# Patient Record
Sex: FEMALE | Race: WHITE | Employment: OTHER | URBAN - METROPOLITAN AREA
[De-identification: names, ages, dates, MRNs, and addresses within clinical notes are randomized per-mention and may not be internally consistent; named-entity substitution may affect disease eponyms.]

---

## 2017-07-13 ENCOUNTER — OFFICE VISIT (OUTPATIENT)
Dept: PRIMARY CARE CLINIC | Age: 62
End: 2017-07-13
Payer: MEDICARE

## 2017-07-13 VITALS
HEIGHT: 65 IN | SYSTOLIC BLOOD PRESSURE: 142 MMHG | WEIGHT: 203 LBS | DIASTOLIC BLOOD PRESSURE: 88 MMHG | RESPIRATION RATE: 17 BRPM | BODY MASS INDEX: 33.82 KG/M2 | HEART RATE: 78 BPM

## 2017-07-13 DIAGNOSIS — Z11.4 SCREENING FOR HIV WITHOUT PRESENCE OF RISK FACTORS: ICD-10-CM

## 2017-07-13 DIAGNOSIS — Z23 NEED FOR PROPHYLACTIC VACCINATION AGAINST STREPTOCOCCUS PNEUMONIAE (PNEUMOCOCCUS): ICD-10-CM

## 2017-07-13 DIAGNOSIS — Z12.31 ENCOUNTER FOR SCREENING MAMMOGRAM FOR BREAST CANCER: ICD-10-CM

## 2017-07-13 DIAGNOSIS — F17.200 SMOKER: Primary | ICD-10-CM

## 2017-07-13 DIAGNOSIS — Z11.59 NEED FOR HEPATITIS C SCREENING TEST: ICD-10-CM

## 2017-07-13 DIAGNOSIS — Z23 NEED FOR PROPHYLACTIC VACCINATION AND INOCULATION AGAINST VARICELLA: ICD-10-CM

## 2017-07-13 DIAGNOSIS — F32.4 MAJOR DEPRESSIVE DISORDER WITH SINGLE EPISODE, IN PARTIAL REMISSION (HCC): ICD-10-CM

## 2017-07-13 DIAGNOSIS — Z23 NEED FOR PROPHYLACTIC VACCINATION WITH TETANUS-DIPHTHERIA (TD): ICD-10-CM

## 2017-07-13 DIAGNOSIS — Z12.11 SCREENING FOR COLON CANCER: ICD-10-CM

## 2017-07-13 DIAGNOSIS — Z13.220 SCREENING FOR HYPERLIPIDEMIA: ICD-10-CM

## 2017-07-13 DIAGNOSIS — Z12.4 PAP SMEAR FOR CERVICAL CANCER SCREENING: ICD-10-CM

## 2017-07-13 PROCEDURE — 99204 OFFICE O/P NEW MOD 45 MIN: CPT | Performed by: INTERNAL MEDICINE

## 2017-07-13 PROCEDURE — G0009 ADMIN PNEUMOCOCCAL VACCINE: HCPCS | Performed by: INTERNAL MEDICINE

## 2017-07-13 PROCEDURE — G0444 DEPRESSION SCREEN ANNUAL: HCPCS | Performed by: INTERNAL MEDICINE

## 2017-07-13 PROCEDURE — 90670 PCV13 VACCINE IM: CPT | Performed by: INTERNAL MEDICINE

## 2017-07-13 RX ORDER — POTASSIUM CHLORIDE 1500 MG/1
20 TABLET, FILM COATED, EXTENDED RELEASE ORAL 2 TIMES DAILY WITH MEALS
COMMUNITY
End: 2019-01-01

## 2017-07-13 RX ORDER — LOSARTAN POTASSIUM AND HYDROCHLOROTHIAZIDE 12.5; 5 MG/1; MG/1
1 TABLET ORAL DAILY
COMMUNITY
End: 2018-03-22

## 2017-07-13 RX ORDER — BUPROPION HYDROCHLORIDE 150 MG/1
150 TABLET, EXTENDED RELEASE ORAL DAILY
Qty: 60 TABLET | Refills: 3 | Status: SHIPPED | OUTPATIENT
Start: 2017-07-13

## 2017-07-13 RX ORDER — TRAZODONE HYDROCHLORIDE 50 MG/1
50 TABLET ORAL NIGHTLY
COMMUNITY
End: 2017-09-21 | Stop reason: SDUPTHER

## 2017-07-13 RX ORDER — FLUOXETINE HYDROCHLORIDE 40 MG/1
40 CAPSULE ORAL DAILY
Qty: 60 CAPSULE | Refills: 3 | Status: SHIPPED | OUTPATIENT
Start: 2017-07-13 | End: 2018-02-26 | Stop reason: SDUPTHER

## 2017-07-13 RX ORDER — FLUOXETINE HYDROCHLORIDE 20 MG/1
20 CAPSULE ORAL DAILY
COMMUNITY
End: 2017-07-13 | Stop reason: SDUPTHER

## 2017-07-13 RX ORDER — BUDESONIDE AND FORMOTEROL FUMARATE DIHYDRATE 160; 4.5 UG/1; UG/1
2 AEROSOL RESPIRATORY (INHALATION) 2 TIMES DAILY
COMMUNITY
End: 2017-12-06 | Stop reason: SDUPTHER

## 2017-07-13 RX ORDER — BUPROPION HYDROCHLORIDE 150 MG/1
TABLET, EXTENDED RELEASE ORAL
COMMUNITY
End: 2017-07-13 | Stop reason: SDUPTHER

## 2017-07-13 RX ORDER — DIPHENHYDRAMINE HCL 25 MG
25 TABLET ORAL EVERY 6 HOURS PRN
COMMUNITY
End: 2018-03-22

## 2017-07-13 RX ORDER — FUROSEMIDE 20 MG/1
20 TABLET ORAL
Status: ON HOLD | COMMUNITY
End: 2018-06-09 | Stop reason: HOSPADM

## 2017-07-13 RX ORDER — LISINOPRIL 5 MG/1
5 TABLET ORAL DAILY
COMMUNITY
End: 2017-07-13 | Stop reason: ALTCHOICE

## 2017-07-13 RX ORDER — CARVEDILOL 25 MG/1
25 TABLET ORAL 2 TIMES DAILY WITH MEALS
COMMUNITY
End: 2017-09-21 | Stop reason: SDUPTHER

## 2017-07-13 RX ORDER — LOSARTAN POTASSIUM 100 MG/1
100 TABLET ORAL
COMMUNITY
End: 2017-07-13 | Stop reason: ALTCHOICE

## 2017-07-13 ASSESSMENT — PATIENT HEALTH QUESTIONNAIRE - PHQ9
SUM OF ALL RESPONSES TO PHQ9 QUESTIONS 1 & 2: 6
1. LITTLE INTEREST OR PLEASURE IN DOING THINGS: 3
6. FEELING BAD ABOUT YOURSELF - OR THAT YOU ARE A FAILURE OR HAVE LET YOURSELF OR YOUR FAMILY DOWN: 3
2. FEELING DOWN, DEPRESSED OR HOPELESS: 3
5. POOR APPETITE OR OVEREATING: 1
10. IF YOU CHECKED OFF ANY PROBLEMS, HOW DIFFICULT HAVE THESE PROBLEMS MADE IT FOR YOU TO DO YOUR WORK, TAKE CARE OF THINGS AT HOME, OR GET ALONG WITH OTHER PEOPLE: 3
8. MOVING OR SPEAKING SO SLOWLY THAT OTHER PEOPLE COULD HAVE NOTICED. OR THE OPPOSITE, BEING SO FIGETY OR RESTLESS THAT YOU HAVE BEEN MOVING AROUND A LOT MORE THAN USUAL: 2
3. TROUBLE FALLING OR STAYING ASLEEP: 0
SUM OF ALL RESPONSES TO PHQ QUESTIONS 1-9: 17
9. THOUGHTS THAT YOU WOULD BE BETTER OFF DEAD, OR OF HURTING YOURSELF: 1
4. FEELING TIRED OR HAVING LITTLE ENERGY: 2
7. TROUBLE CONCENTRATING ON THINGS, SUCH AS READING THE NEWSPAPER OR WATCHING TELEVISION: 2

## 2017-07-13 ASSESSMENT — ENCOUNTER SYMPTOMS
VOMITING: 0
NAUSEA: 0
SHORTNESS OF BREATH: 0
ABDOMINAL PAIN: 0
TROUBLE SWALLOWING: 0
EYE DISCHARGE: 0
EYE REDNESS: 0
BACK PAIN: 1
SORE THROAT: 0
COUGH: 0

## 2017-08-17 ENCOUNTER — HOSPITAL ENCOUNTER (OUTPATIENT)
Dept: MAMMOGRAPHY | Age: 62
Discharge: HOME | End: 2017-08-17
Payer: MEDICARE

## 2017-08-17 DIAGNOSIS — Z12.31 ENCOUNTER FOR SCREENING MAMMOGRAM FOR BREAST CANCER: ICD-10-CM

## 2017-08-17 PROCEDURE — G0202 SCR MAMMO BI INCL CAD: HCPCS

## 2017-09-19 ENCOUNTER — OFFICE VISIT (OUTPATIENT)
Dept: OBGYN CLINIC | Age: 62
End: 2017-09-19
Payer: MEDICARE

## 2017-09-19 ENCOUNTER — HOSPITAL ENCOUNTER (OUTPATIENT)
Age: 62
Setting detail: SPECIMEN
Discharge: HOME | End: 2017-09-19
Payer: MEDICARE

## 2017-09-19 VITALS
HEIGHT: 66 IN | DIASTOLIC BLOOD PRESSURE: 70 MMHG | BODY MASS INDEX: 33.43 KG/M2 | SYSTOLIC BLOOD PRESSURE: 130 MMHG | WEIGHT: 208 LBS

## 2017-09-19 DIAGNOSIS — Z13.820 SCREENING FOR OSTEOPOROSIS: ICD-10-CM

## 2017-09-19 DIAGNOSIS — R61 SWEATING INCREASE: ICD-10-CM

## 2017-09-19 DIAGNOSIS — Z11.59 ENCOUNTER FOR HEPATITIS C SCREENING TEST FOR LOW RISK PATIENT: ICD-10-CM

## 2017-09-19 DIAGNOSIS — Z11.4 ENCOUNTER FOR SCREENING FOR HIV: ICD-10-CM

## 2017-09-19 DIAGNOSIS — Z01.419 PAP SMEAR, AS PART OF ROUTINE GYNECOLOGICAL EXAMINATION: Primary | ICD-10-CM

## 2017-09-19 PROCEDURE — G0101 CA SCREEN;PELVIC/BREAST EXAM: HCPCS | Performed by: NURSE PRACTITIONER

## 2017-09-19 ASSESSMENT — ENCOUNTER SYMPTOMS
WHEEZING: 0
CONSTIPATION: 0
PHOTOPHOBIA: 0
COUGH: 0
VOMITING: 0
CHEST TIGHTNESS: 0
ABDOMINAL PAIN: 0
COLOR CHANGE: 0
BLOOD IN STOOL: 0
ABDOMINAL DISTENTION: 0
BACK PAIN: 0
DIARRHEA: 0
SHORTNESS OF BREATH: 0
NAUSEA: 0

## 2017-09-21 ENCOUNTER — OFFICE VISIT (OUTPATIENT)
Dept: PRIMARY CARE CLINIC | Age: 62
End: 2017-09-21
Payer: MEDICARE

## 2017-09-21 VITALS
HEART RATE: 47 BPM | BODY MASS INDEX: 34.55 KG/M2 | OXYGEN SATURATION: 96 % | HEIGHT: 65 IN | WEIGHT: 207.4 LBS | RESPIRATION RATE: 18 BRPM | DIASTOLIC BLOOD PRESSURE: 86 MMHG | SYSTOLIC BLOOD PRESSURE: 156 MMHG

## 2017-09-21 DIAGNOSIS — I10 ESSENTIAL HYPERTENSION: ICD-10-CM

## 2017-09-21 DIAGNOSIS — M54.5 CHRONIC LOW BACK PAIN, UNSPECIFIED BACK PAIN LATERALITY, WITH SCIATICA PRESENCE UNSPECIFIED: ICD-10-CM

## 2017-09-21 DIAGNOSIS — G89.29 CHRONIC LOW BACK PAIN, UNSPECIFIED BACK PAIN LATERALITY, WITH SCIATICA PRESENCE UNSPECIFIED: ICD-10-CM

## 2017-09-21 DIAGNOSIS — Z00.00 MEDICARE ANNUAL WELLNESS VISIT, INITIAL: Primary | ICD-10-CM

## 2017-09-21 PROCEDURE — 99214 OFFICE O/P EST MOD 30 MIN: CPT | Performed by: INTERNAL MEDICINE

## 2017-09-21 RX ORDER — AMLODIPINE BESYLATE 5 MG/1
5 TABLET ORAL
COMMUNITY
Start: 2017-08-04 | End: 2017-09-21 | Stop reason: SDUPTHER

## 2017-09-21 RX ORDER — TRAMADOL HYDROCHLORIDE 50 MG/1
50 TABLET ORAL 2 TIMES DAILY PRN
Qty: 30 TABLET | Refills: 0 | Status: SHIPPED | OUTPATIENT
Start: 2017-09-21 | End: 2017-10-01

## 2017-09-21 RX ORDER — AMLODIPINE BESYLATE 10 MG/1
10 TABLET ORAL DAILY
Qty: 90 TABLET | Refills: 3 | Status: SHIPPED | OUTPATIENT
Start: 2017-09-21 | End: 2018-09-24 | Stop reason: SDUPTHER

## 2017-09-21 RX ORDER — TRAZODONE HYDROCHLORIDE 50 MG/1
50 TABLET ORAL NIGHTLY
Qty: 30 TABLET | Refills: 1 | Status: SHIPPED | OUTPATIENT
Start: 2017-09-21 | End: 2017-12-06 | Stop reason: SDUPTHER

## 2017-09-21 RX ORDER — CARVEDILOL 25 MG/1
25 TABLET ORAL 2 TIMES DAILY WITH MEALS
Qty: 60 TABLET | Refills: 3 | Status: SHIPPED | OUTPATIENT
Start: 2017-09-21 | End: 2018-01-29 | Stop reason: SDUPTHER

## 2017-09-21 ASSESSMENT — PATIENT HEALTH QUESTIONNAIRE - PHQ9
1. LITTLE INTEREST OR PLEASURE IN DOING THINGS: 0
SUM OF ALL RESPONSES TO PHQ9 QUESTIONS 1 & 2: 0
SUM OF ALL RESPONSES TO PHQ QUESTIONS 1-9: 0
2. FEELING DOWN, DEPRESSED OR HOPELESS: 0

## 2017-09-21 ASSESSMENT — ENCOUNTER SYMPTOMS
TROUBLE SWALLOWING: 0
NAUSEA: 0
SHORTNESS OF BREATH: 0
COUGH: 0
EYE DISCHARGE: 0
VOMITING: 0
EYE REDNESS: 0
SORE THROAT: 0
BACK PAIN: 1
ABDOMINAL PAIN: 0

## 2017-09-21 ASSESSMENT — LIFESTYLE VARIABLES: HOW OFTEN DO YOU HAVE A DRINK CONTAINING ALCOHOL: 0

## 2017-09-21 ASSESSMENT — ANXIETY QUESTIONNAIRES: GAD7 TOTAL SCORE: 4

## 2017-09-22 ENCOUNTER — HOSPITAL ENCOUNTER (OUTPATIENT)
Dept: MAMMOGRAPHY | Age: 62
Discharge: HOME | End: 2017-09-22
Payer: MEDICARE

## 2017-09-22 ENCOUNTER — HOSPITAL ENCOUNTER (OUTPATIENT)
Age: 62
Discharge: HOME | End: 2017-09-22
Payer: MEDICARE

## 2017-09-22 DIAGNOSIS — Z13.820 SCREENING FOR OSTEOPOROSIS: ICD-10-CM

## 2017-09-22 LAB
HEPATITIS C ANTIBODY: NONREACTIVE
HIV AG/AB: NONREACTIVE
HPV SAMPLE: NORMAL
HPV SOURCE: NORMAL
HPV, GENOTYPE 16: NOT DETECTED
HPV, GENOTYPE 18: NOT DETECTED
HPV, HIGH RISK OTHER: NOT DETECTED
HPV, INTERPRETATION: NORMAL
TSH SERPL DL<=0.05 MIU/L-ACNC: 0.63 MIU/L (ref 0.3–5)

## 2017-09-22 PROCEDURE — 84443 ASSAY THYROID STIM HORMONE: CPT

## 2017-09-22 PROCEDURE — 86803 HEPATITIS C AB TEST: CPT

## 2017-09-22 PROCEDURE — 36415 COLL VENOUS BLD VENIPUNCTURE: CPT

## 2017-09-22 PROCEDURE — 87389 HIV-1 AG W/HIV-1&-2 AB AG IA: CPT

## 2017-09-22 PROCEDURE — 77080 DXA BONE DENSITY AXIAL: CPT

## 2017-09-25 LAB — CYTOLOGY REPORT: NORMAL

## 2017-09-26 ENCOUNTER — TELEPHONE (OUTPATIENT)
Dept: OBGYN CLINIC | Age: 62
End: 2017-09-26

## 2017-12-06 RX ORDER — BUDESONIDE AND FORMOTEROL FUMARATE DIHYDRATE 160; 4.5 UG/1; UG/1
1 AEROSOL RESPIRATORY (INHALATION) 2 TIMES DAILY
Qty: 1 INHALER | Refills: 2 | OUTPATIENT
Start: 2017-12-06 | End: 2018-02-26 | Stop reason: SDUPTHER

## 2017-12-06 RX ORDER — TRAZODONE HYDROCHLORIDE 50 MG/1
TABLET ORAL
Qty: 30 TABLET | Refills: 1 | Status: SHIPPED | OUTPATIENT
Start: 2017-12-06 | End: 2018-01-29 | Stop reason: SDUPTHER

## 2017-12-06 NOTE — TELEPHONE ENCOUNTER
Last OV 9/21/17    Health Maintenance   Topic Date Due    Colon cancer screen colonoscopy  01/25/2003    Zostavax vaccine  01/25/2013    Flu vaccine (1) 09/01/2017    Lipid screen  04/18/2018 (Originally 1/25/1993)    Diabetes screen  04/18/2018 (Originally 1/25/1993)    DTaP/Tdap/Td vaccine (1 - Tdap) 07/13/2018 (Originally 1/25/1972)    Pneumococcal med risk (1 of 1 - PPSV23) 07/13/2018 (Originally 1/25/1972)    Breast cancer screen  08/17/2019    Hepatitis C screen  Completed    HIV screen  Completed             (applicable per patient's age: Cancer Screenings, Depression Screening, Fall Risk Screening, Immunizations)    BUN (mg/dL)   Date Value   11/12/2014 10      (goal A1C is < 7)   (goal LDL is <100) need 30-50% reduction from baseline     BP Readings from Last 3 Encounters:   09/21/17 (!) 156/86   09/19/17 130/70   07/13/17 (!) 142/88    (goal /80)      All Future Testing planned in [facility]:  Lab Frequency Next Occurrence   POCT FECAL IMMUNOCHEMICAL TEST (FIT) Once 08/12/2017   Hepatitis C Antibody Once 09/19/2017   HIV Screen Once 10/19/2017   TSH With Reflex Ft4 Once 09/19/2017       Next Visit Date:  Future Appointments  Date Time Provider Marianela Renner   3/22/2018 9:00 AM Mandy Patel MD [facility]            There is no problem list on file for this patient.

## 2017-12-06 NOTE — TELEPHONE ENCOUNTER
Last seen 09/21/17  Last fill 09/21/17 # 30 1 refill     Health Maintenance   Topic Date Due    Colon cancer screen colonoscopy  01/25/2003    Zostavax vaccine  01/25/2013    Flu vaccine (1) 09/01/2017    Lipid screen  04/18/2018 (Originally 1/25/1993)    Diabetes screen  04/18/2018 (Originally 1/25/1993)    DTaP/Tdap/Td vaccine (1 - Tdap) 07/13/2018 (Originally 1/25/1972)    Pneumococcal med risk (1 of 1 - PPSV23) 07/13/2018 (Originally 1/25/1972)    Breast cancer screen  08/17/2019    Hepatitis C screen  Completed    HIV screen  Completed             (applicable per patient's age: Cancer Screenings, Depression Screening, Fall Risk Screening, Immunizations)    BUN (mg/dL)   Date Value   11/12/2014 10      (goal A1C is < 7)   (goal LDL is <100) need 30-50% reduction from baseline     BP Readings from Last 3 Encounters:   09/21/17 (!) 156/86   09/19/17 130/70   07/13/17 (!) 142/88    (goal /80)      All Future Testing planned in [facility]:  Lab Frequency Next Occurrence   POCT FECAL IMMUNOCHEMICAL TEST (FIT) Once 08/12/2017   Hepatitis C Antibody Once 09/19/2017   HIV Screen Once 10/19/2017   TSH With Reflex Ft4 Once 09/19/2017       Next Visit Date:  Future Appointments  Date Time Provider Marianela Renner   3/22/2018 9:00 AM Armond Azul MD [facility]            There is no problem list on file for this patient.

## 2018-01-29 RX ORDER — TRAZODONE HYDROCHLORIDE 50 MG/1
TABLET ORAL
Qty: 30 TABLET | Refills: 1 | Status: SHIPPED | OUTPATIENT
Start: 2018-01-29 | End: 2018-03-26 | Stop reason: SDUPTHER

## 2018-01-29 RX ORDER — CARVEDILOL 25 MG/1
TABLET ORAL
Qty: 60 TABLET | Refills: 3 | Status: SHIPPED | OUTPATIENT
Start: 2018-01-29 | End: 2018-05-15 | Stop reason: SDUPTHER

## 2018-02-26 DIAGNOSIS — F32.4 MAJOR DEPRESSIVE DISORDER WITH SINGLE EPISODE, IN PARTIAL REMISSION (HCC): ICD-10-CM

## 2018-02-26 RX ORDER — BUDESONIDE AND FORMOTEROL FUMARATE DIHYDRATE 160; 4.5 UG/1; UG/1
AEROSOL RESPIRATORY (INHALATION)
Qty: 10.2 G | Refills: 2 | Status: SHIPPED | OUTPATIENT
Start: 2018-02-26 | End: 2018-03-26 | Stop reason: SDUPTHER

## 2018-02-26 RX ORDER — FLUOXETINE HYDROCHLORIDE 40 MG/1
CAPSULE ORAL
Qty: 60 CAPSULE | Refills: 3 | Status: SHIPPED | OUTPATIENT
Start: 2018-02-26 | End: 2018-09-24 | Stop reason: SDUPTHER

## 2018-03-22 ENCOUNTER — OFFICE VISIT (OUTPATIENT)
Dept: PRIMARY CARE CLINIC | Age: 63
End: 2018-03-22
Payer: MEDICARE

## 2018-03-22 VITALS
WEIGHT: 197 LBS | DIASTOLIC BLOOD PRESSURE: 80 MMHG | SYSTOLIC BLOOD PRESSURE: 132 MMHG | HEIGHT: 65 IN | HEART RATE: 73 BPM | BODY MASS INDEX: 32.82 KG/M2 | OXYGEN SATURATION: 93 %

## 2018-03-22 DIAGNOSIS — F51.01 PRIMARY INSOMNIA: ICD-10-CM

## 2018-03-22 DIAGNOSIS — I10 ESSENTIAL HYPERTENSION: Primary | ICD-10-CM

## 2018-03-22 DIAGNOSIS — J41.0 SIMPLE CHRONIC BRONCHITIS (HCC): ICD-10-CM

## 2018-03-22 DIAGNOSIS — Z12.11 SCREENING FOR COLON CANCER: ICD-10-CM

## 2018-03-22 DIAGNOSIS — J42 CHRONIC BRONCHITIS, UNSPECIFIED CHRONIC BRONCHITIS TYPE (HCC): ICD-10-CM

## 2018-03-22 DIAGNOSIS — I50.22 CHRONIC SYSTOLIC CONGESTIVE HEART FAILURE (HCC): ICD-10-CM

## 2018-03-22 DIAGNOSIS — F32.5 MAJOR DEPRESSIVE DISORDER WITH SINGLE EPISODE, IN FULL REMISSION (HCC): ICD-10-CM

## 2018-03-22 DIAGNOSIS — Z82.61 FAMILY HISTORY OF RHEUMATOID ARTHRITIS: ICD-10-CM

## 2018-03-22 DIAGNOSIS — M19.90 ARTHRITIS: ICD-10-CM

## 2018-03-22 PROCEDURE — G8417 CALC BMI ABV UP PARAM F/U: HCPCS | Performed by: INTERNAL MEDICINE

## 2018-03-22 PROCEDURE — 1123F ACP DISCUSS/DSCN MKR DOCD: CPT | Performed by: INTERNAL MEDICINE

## 2018-03-22 PROCEDURE — G8484 FLU IMMUNIZE NO ADMIN: HCPCS | Performed by: INTERNAL MEDICINE

## 2018-03-22 PROCEDURE — 4004F PT TOBACCO SCREEN RCVD TLK: CPT | Performed by: INTERNAL MEDICINE

## 2018-03-22 PROCEDURE — G8926 SPIRO NO PERF OR DOC: HCPCS | Performed by: INTERNAL MEDICINE

## 2018-03-22 PROCEDURE — 3014F SCREEN MAMMO DOC REV: CPT | Performed by: INTERNAL MEDICINE

## 2018-03-22 PROCEDURE — G8427 DOCREV CUR MEDS BY ELIG CLIN: HCPCS | Performed by: INTERNAL MEDICINE

## 2018-03-22 PROCEDURE — 1090F PRES/ABSN URINE INCON ASSESS: CPT | Performed by: INTERNAL MEDICINE

## 2018-03-22 PROCEDURE — 4040F PNEUMOC VAC/ADMIN/RCVD: CPT | Performed by: INTERNAL MEDICINE

## 2018-03-22 PROCEDURE — 3017F COLORECTAL CA SCREEN DOC REV: CPT | Performed by: INTERNAL MEDICINE

## 2018-03-22 PROCEDURE — 3023F SPIROM DOC REV: CPT | Performed by: INTERNAL MEDICINE

## 2018-03-22 PROCEDURE — G8399 PT W/DXA RESULTS DOCUMENT: HCPCS | Performed by: INTERNAL MEDICINE

## 2018-03-22 PROCEDURE — 99214 OFFICE O/P EST MOD 30 MIN: CPT | Performed by: INTERNAL MEDICINE

## 2018-03-22 ASSESSMENT — ENCOUNTER SYMPTOMS
SORE THROAT: 0
TROUBLE SWALLOWING: 0
NAUSEA: 0
BACK PAIN: 0
EYE REDNESS: 0
ABDOMINAL PAIN: 0
SHORTNESS OF BREATH: 0
VOMITING: 0
EYE DISCHARGE: 0
COUGH: 0

## 2018-03-22 NOTE — PROGRESS NOTES
[facility] PHYSICIANS  [facility] INTERNAL MEDICINE  [address]  [address] [facility] [address]  Dept: [phone]  Dept Fax: [phone]    Tiffany Bucio is a 70 y.o. female who presents today for her medical conditions/complaints as noted below. Tiffany Bucio is c/o of   Chief Complaint   Patient presents with    Hypertension     6 month follow up. Pt here for management of chronic medical problems. HPI:     HPI    She was Known to have  Hypertension ,controlled   Denies  HEADACHES , PALPITATIONS     No   complaints of dizziness. No     shortness of breath . No    chest pain . COPD - stable on current inhalers     CHF , systolic , EF 33 % - follows with cardio at [address] [place] [address] - stable on lasix . Follow up visit coming soon . 1. Depression is in remission . 2. Mood ok . 3. Appetite and sleep ok . 4. PHQ2 negative . On wellbutrin       BUN (mg/dL)   Date Value   11/12/2014 10     BP Readings from Last 3 Encounters:   03/22/18 132/80   09/21/17 (!) 156/86   09/19/17 130/70          (goal 120/80)    Past Medical History:   Diagnosis Date    COPD (chronic obstructive pulmonary disease) ([facility] [address]     Depression     Hyperlipidemia     Hypertension       Past Surgical History:   Procedure Laterality Date    ANKLE SURGERY      broken ankle    APPENDECTOMY      HYSTERECTOMY, TOTAL ABDOMINAL  1982    SHOULDER SURGERY      TONSILLECTOMY         History reviewed. No pertinent family history.     Social History   Substance Use Topics    Smoking status: Current Every Day Smoker     Packs/day: 0.25     Types: Cigarettes     Start date: 7/13/1963    Smokeless tobacco: Never Used      Comment: 2 cigarettes a day; trying to quit    Alcohol use No      Current Outpatient Prescriptions   Medication Sig Dispense Refill    SYMBICORT 160-4.5 MCG/ACT AERO INHALE 1 PUFF BY MOUTH TWO TIMES A DAY 10.2 g 2    FLUoxetine (PROZAC) 40 MG capsule TAKE ONE CAPSULE BY MOUTH ONCE DAILY 60 capsule 3    Skin: Negative for rash and wound. Neurological: Negative for dizziness and headaches. Hematological: Negative for adenopathy. Psychiatric/Behavioral: Negative for behavioral problems. The patient is not nervous/anxious. Objective:     Physical Exam   Constitutional: She is oriented to person, place, and time. She appears well-developed and well-nourished. No distress. HENT:   Head: Normocephalic and atraumatic. Right Ear: External ear normal.   Left Ear: External ear normal.   Nose: Nose normal.   Mouth/Throat: Oropharynx is clear and moist. No oropharyngeal exudate. Eyes: Conjunctivae are normal. Right eye exhibits no discharge. Left eye exhibits no discharge. No scleral icterus. Neck: Neck supple. Cardiovascular: Normal rate, regular rhythm and normal heart sounds. No murmur heard. Pulmonary/Chest: Effort normal and breath sounds normal. No respiratory distress. She has no wheezes. Abdominal: Soft. Bowel sounds are normal. She exhibits no distension. There is no tenderness. Musculoskeletal: She exhibits no edema or tenderness. Lymphadenopathy:     She has no cervical adenopathy. Neurological: She is alert and oriented to person, place, and time. Skin: Skin is warm and dry. No rash noted. She is not diaphoretic. Psychiatric: Judgment and thought content normal.   Nursing note and vitals reviewed. /80 (Site: Left Arm, Position: Sitting, Cuff Size: Medium Adult)   Pulse 73   Ht 5' 5\" (1.651 m)   Wt 197 lb (89.4 kg)   SpO2 93%   BMI 32.78 kg/m²     Assessment:      1. Essential hypertension  controlled well - continue current meds , advised daily exercise , low salt diet and DASH diet as well . CBC Auto Differential    Comprehensive Metabolic Panel    Lipid Panel   2. Screening for colon cancer  [facility] [address], DO*   3. Major depressive disorder with single episode, in full remission ([facility] [address]     4.  Primary insomnia

## 2018-03-27 RX ORDER — BUDESONIDE AND FORMOTEROL FUMARATE DIHYDRATE 160; 4.5 UG/1; UG/1
AEROSOL RESPIRATORY (INHALATION)
Qty: 10.2 G | Refills: 2 | Status: SHIPPED | OUTPATIENT
Start: 2018-03-27 | End: 2019-03-26

## 2018-03-27 RX ORDER — TRAZODONE HYDROCHLORIDE 50 MG/1
TABLET ORAL
Qty: 30 TABLET | Refills: 1 | Status: ON HOLD | OUTPATIENT
Start: 2018-03-27 | End: 2018-06-09 | Stop reason: HOSPADM

## 2018-05-15 RX ORDER — CARVEDILOL 25 MG/1
TABLET ORAL
Qty: 60 TABLET | Refills: 3 | Status: SHIPPED | OUTPATIENT
Start: 2018-05-15 | End: 2018-11-12 | Stop reason: SDUPTHER

## 2018-05-16 LAB
CHOLESTEROL, TOTAL: 213 MG/DL
CHOLESTEROL/HDL RATIO: 3.3
HDLC SERPL-MCNC: 64 MG/DL (ref 35–70)
LDL CHOLESTEROL CALCULATED: 136 MG/DL (ref 0–160)
SEDIMENTATION RATE, ERYTHROCYTE: 8
TRIGL SERPL-MCNC: 64 MG/DL
VLDLC SERPL CALC-MCNC: 13 MG/DL

## 2018-05-17 ENCOUNTER — TELEPHONE (OUTPATIENT)
Dept: PRIMARY CARE CLINIC | Age: 63
End: 2018-05-17

## 2018-05-17 DIAGNOSIS — Z82.61 FAMILY HISTORY OF RHEUMATOID ARTHRITIS: ICD-10-CM

## 2018-05-17 DIAGNOSIS — I10 ESSENTIAL HYPERTENSION: ICD-10-CM

## 2018-05-18 DIAGNOSIS — Z82.61 FAMILY HISTORY OF RHEUMATOID ARTHRITIS: ICD-10-CM

## 2018-05-22 ENCOUNTER — APPOINTMENT (OUTPATIENT)
Dept: GENERAL RADIOLOGY | Age: 63
End: 2018-05-22
Payer: MEDICARE

## 2018-05-22 ENCOUNTER — HOSPITAL ENCOUNTER (EMERGENCY)
Age: 63
Discharge: HOME | End: 2018-05-22
Attending: SPECIALIST
Payer: MEDICARE

## 2018-05-22 VITALS
DIASTOLIC BLOOD PRESSURE: 101 MMHG | HEIGHT: 65 IN | BODY MASS INDEX: 34.16 KG/M2 | OXYGEN SATURATION: 97 % | HEART RATE: 72 BPM | WEIGHT: 205 LBS | RESPIRATION RATE: 18 BRPM | SYSTOLIC BLOOD PRESSURE: 169 MMHG | TEMPERATURE: 98.6 F

## 2018-05-22 DIAGNOSIS — S82.831A CLOSED FRACTURE OF DISTAL END OF RIGHT FIBULA, UNSPECIFIED FRACTURE MORPHOLOGY, INITIAL ENCOUNTER: Primary | ICD-10-CM

## 2018-05-22 PROCEDURE — 73630 X-RAY EXAM OF FOOT: CPT

## 2018-05-22 PROCEDURE — 29515 APPLICATION SHORT LEG SPLINT: CPT

## 2018-05-22 PROCEDURE — 73610 X-RAY EXAM OF ANKLE: CPT

## 2018-05-22 PROCEDURE — 6370000000 HC RX 637 (ALT 250 FOR IP): Performed by: PHYSICIAN ASSISTANT

## 2018-05-22 PROCEDURE — 99283 EMERGENCY DEPT VISIT LOW MDM: CPT

## 2018-05-22 RX ORDER — HYDROCODONE BITARTRATE AND ACETAMINOPHEN 5; 325 MG/1; MG/1
1 TABLET ORAL EVERY 8 HOURS PRN
Qty: 12 TABLET | Refills: 0 | Status: SHIPPED | OUTPATIENT
Start: 2018-05-22 | End: 2018-05-26

## 2018-05-22 RX ORDER — IBUPROFEN 800 MG/1
800 TABLET ORAL ONCE
Status: COMPLETED | OUTPATIENT
Start: 2018-05-22 | End: 2018-05-22

## 2018-05-22 RX ADMIN — IBUPROFEN 800 MG: 800 TABLET ORAL at 13:55

## 2018-05-22 ASSESSMENT — ENCOUNTER SYMPTOMS
EYE REDNESS: 0
VOMITING: 0
SORE THROAT: 0
NAUSEA: 0
COUGH: 0
BACK PAIN: 0
EYE DISCHARGE: 0
ABDOMINAL PAIN: 0
SHORTNESS OF BREATH: 0

## 2018-05-22 ASSESSMENT — PAIN SCALES - GENERAL: PAINLEVEL_OUTOF10: 4

## 2018-05-24 ENCOUNTER — OFFICE VISIT (OUTPATIENT)
Dept: ORTHOPEDIC SURGERY | Age: 63
End: 2018-05-24
Payer: MEDICARE

## 2018-05-24 VITALS
HEART RATE: 78 BPM | WEIGHT: 205.03 LBS | HEIGHT: 65 IN | BODY MASS INDEX: 34.16 KG/M2 | DIASTOLIC BLOOD PRESSURE: 93 MMHG | SYSTOLIC BLOOD PRESSURE: 148 MMHG

## 2018-05-24 DIAGNOSIS — S82.64XA NONDISPLACED FRACTURE OF LATERAL MALLEOLUS OF RIGHT FIBULA, INITIAL ENCOUNTER FOR CLOSED FRACTURE: Primary | ICD-10-CM

## 2018-05-24 PROCEDURE — 99203 OFFICE O/P NEW LOW 30 MIN: CPT | Performed by: ORTHOPAEDIC SURGERY

## 2018-05-24 PROCEDURE — 1123F ACP DISCUSS/DSCN MKR DOCD: CPT | Performed by: ORTHOPAEDIC SURGERY

## 2018-05-24 PROCEDURE — 4004F PT TOBACCO SCREEN RCVD TLK: CPT | Performed by: ORTHOPAEDIC SURGERY

## 2018-05-24 PROCEDURE — G8399 PT W/DXA RESULTS DOCUMENT: HCPCS | Performed by: ORTHOPAEDIC SURGERY

## 2018-05-24 PROCEDURE — G8417 CALC BMI ABV UP PARAM F/U: HCPCS | Performed by: ORTHOPAEDIC SURGERY

## 2018-05-24 PROCEDURE — G8427 DOCREV CUR MEDS BY ELIG CLIN: HCPCS | Performed by: ORTHOPAEDIC SURGERY

## 2018-05-24 PROCEDURE — 3017F COLORECTAL CA SCREEN DOC REV: CPT | Performed by: ORTHOPAEDIC SURGERY

## 2018-05-24 PROCEDURE — 4040F PNEUMOC VAC/ADMIN/RCVD: CPT | Performed by: ORTHOPAEDIC SURGERY

## 2018-05-24 PROCEDURE — 1090F PRES/ABSN URINE INCON ASSESS: CPT | Performed by: ORTHOPAEDIC SURGERY

## 2018-05-24 ASSESSMENT — ENCOUNTER SYMPTOMS
APNEA: 0
CHEST TIGHTNESS: 0
VOMITING: 0
ABDOMINAL PAIN: 0
COUGH: 0

## 2018-05-31 DIAGNOSIS — S82.64XA NONDISPLACED FRACTURE OF LATERAL MALLEOLUS OF RIGHT FIBULA, INITIAL ENCOUNTER FOR CLOSED FRACTURE: Primary | ICD-10-CM

## 2018-06-01 ENCOUNTER — OFFICE VISIT (OUTPATIENT)
Dept: ORTHOPEDIC SURGERY | Age: 63
End: 2018-06-01
Payer: MEDICARE

## 2018-06-01 VITALS — HEIGHT: 65 IN | WEIGHT: 205.03 LBS | BODY MASS INDEX: 34.16 KG/M2

## 2018-06-01 DIAGNOSIS — S82.64XD CLOSED NONDISPLACED FRACTURE OF LATERAL MALLEOLUS OF RIGHT FIBULA WITH ROUTINE HEALING: Primary | ICD-10-CM

## 2018-06-01 PROCEDURE — G8427 DOCREV CUR MEDS BY ELIG CLIN: HCPCS | Performed by: ORTHOPAEDIC SURGERY

## 2018-06-01 PROCEDURE — 4040F PNEUMOC VAC/ADMIN/RCVD: CPT | Performed by: ORTHOPAEDIC SURGERY

## 2018-06-01 PROCEDURE — 1123F ACP DISCUSS/DSCN MKR DOCD: CPT | Performed by: ORTHOPAEDIC SURGERY

## 2018-06-01 PROCEDURE — 3017F COLORECTAL CA SCREEN DOC REV: CPT | Performed by: ORTHOPAEDIC SURGERY

## 2018-06-01 PROCEDURE — G8399 PT W/DXA RESULTS DOCUMENT: HCPCS | Performed by: ORTHOPAEDIC SURGERY

## 2018-06-01 PROCEDURE — 4004F PT TOBACCO SCREEN RCVD TLK: CPT | Performed by: ORTHOPAEDIC SURGERY

## 2018-06-01 PROCEDURE — G8417 CALC BMI ABV UP PARAM F/U: HCPCS | Performed by: ORTHOPAEDIC SURGERY

## 2018-06-01 PROCEDURE — 1090F PRES/ABSN URINE INCON ASSESS: CPT | Performed by: ORTHOPAEDIC SURGERY

## 2018-06-01 PROCEDURE — 99213 OFFICE O/P EST LOW 20 MIN: CPT | Performed by: ORTHOPAEDIC SURGERY

## 2018-06-01 ASSESSMENT — ENCOUNTER SYMPTOMS
DIARRHEA: 0
CONSTIPATION: 0
NAUSEA: 0
COUGH: 0

## 2018-06-08 ENCOUNTER — APPOINTMENT (OUTPATIENT)
Dept: CT IMAGING | Age: 63
DRG: 052 | End: 2018-06-08
Payer: MEDICARE

## 2018-06-08 ENCOUNTER — APPOINTMENT (OUTPATIENT)
Dept: GENERAL RADIOLOGY | Age: 63
DRG: 052 | End: 2018-06-08
Payer: MEDICARE

## 2018-06-08 ENCOUNTER — APPOINTMENT (OUTPATIENT)
Dept: MRI IMAGING | Age: 63
DRG: 052 | End: 2018-06-08
Payer: MEDICARE

## 2018-06-08 ENCOUNTER — HOSPITAL ENCOUNTER (INPATIENT)
Age: 63
LOS: 1 days | Discharge: HOME | DRG: 052 | End: 2018-06-09
Attending: EMERGENCY MEDICINE | Admitting: INTERNAL MEDICINE
Payer: MEDICARE

## 2018-06-08 DIAGNOSIS — S14.103A: Primary | ICD-10-CM

## 2018-06-08 DIAGNOSIS — R40.20 LOSS OF CONSCIOUSNESS (HCC): ICD-10-CM

## 2018-06-08 DIAGNOSIS — S00.03XA HEMATOMA OF SCALP, INITIAL ENCOUNTER: ICD-10-CM

## 2018-06-08 PROBLEM — F32.A DEPRESSION: Status: ACTIVE | Noted: 2018-06-08

## 2018-06-08 LAB
ABSOLUTE EOS #: 0.19 K/UL (ref 0–0.44)
ABSOLUTE IMMATURE GRANULOCYTE: 0.04 K/UL (ref 0–0.3)
ABSOLUTE LYMPH #: 1.47 K/UL (ref 1.1–3.7)
ABSOLUTE MONO #: 0.42 K/UL (ref 0.1–1.2)
ANION GAP SERPL CALCULATED.3IONS-SCNC: 12 MMOL/L (ref 9–17)
BASOPHILS # BLD: 1 % (ref 0–2)
BASOPHILS ABSOLUTE: 0.06 K/UL (ref 0–0.2)
BILIRUBIN URINE: NEGATIVE
BUN BLDV-MCNC: 12 MG/DL (ref 8–23)
BUN/CREAT BLD: ABNORMAL (ref 9–20)
CALCIUM SERPL-MCNC: 8.6 MG/DL (ref 8.6–10.4)
CHLORIDE BLD-SCNC: 101 MMOL/L (ref 98–107)
CO2: 26 MMOL/L (ref 20–31)
COLOR: YELLOW
COMMENT UA: NORMAL
CREAT SERPL-MCNC: 0.51 MG/DL (ref 0.5–0.9)
DIFFERENTIAL TYPE: ABNORMAL
EOSINOPHILS RELATIVE PERCENT: 3 % (ref 1–4)
ETHANOL PERCENT: <0.01 %
ETHANOL: <10 MG/DL
GFR AFRICAN AMERICAN: >60 ML/MIN
GFR NON-AFRICAN AMERICAN: >60 ML/MIN
GFR SERPL CREATININE-BSD FRML MDRD: ABNORMAL ML/MIN/{1.73_M2}
GFR SERPL CREATININE-BSD FRML MDRD: ABNORMAL ML/MIN/{1.73_M2}
GLUCOSE BLD-MCNC: 115 MG/DL (ref 70–99)
GLUCOSE URINE: NEGATIVE
HCT VFR BLD CALC: 46.2 % (ref 36.3–47.1)
HEMOGLOBIN: 14.5 G/DL (ref 11.9–15.1)
IMMATURE GRANULOCYTES: 1 %
INR BLD: 1
KETONES, URINE: NEGATIVE
LEUKOCYTE ESTERASE, URINE: NEGATIVE
LYMPHOCYTES # BLD: 24 % (ref 24–43)
MCH RBC QN AUTO: 26.7 PG (ref 25.2–33.5)
MCHC RBC AUTO-ENTMCNC: 31.4 G/DL (ref 28.4–34.8)
MCV RBC AUTO: 84.9 FL (ref 82.6–102.9)
MONOCYTES # BLD: 7 % (ref 3–12)
NITRITE, URINE: NEGATIVE
NRBC AUTOMATED: 0 PER 100 WBC
PDW BLD-RTO: 14 % (ref 11.8–14.4)
PH UA: 7.5 (ref 5–8)
PLATELET # BLD: 194 K/UL (ref 138–453)
PLATELET ESTIMATE: ABNORMAL
PMV BLD AUTO: 10.1 FL (ref 8.1–13.5)
POTASSIUM SERPL-SCNC: 4 MMOL/L (ref 3.7–5.3)
PROTEIN UA: NEGATIVE
PROTHROMBIN TIME: 10.8 SEC (ref 9–12)
RBC # BLD: 5.44 M/UL (ref 3.95–5.11)
RBC # BLD: ABNORMAL 10*6/UL
SEG NEUTROPHILS: 64 % (ref 36–65)
SEGMENTED NEUTROPHILS ABSOLUTE COUNT: 4.05 K/UL (ref 1.5–8.1)
SODIUM BLD-SCNC: 139 MMOL/L (ref 135–144)
SPECIFIC GRAVITY UA: 1.01 (ref 1–1.03)
TURBIDITY: CLEAR
URINE HGB: NEGATIVE
UROBILINOGEN, URINE: NORMAL
WBC # BLD: 6.2 K/UL (ref 3.5–11.3)
WBC # BLD: ABNORMAL 10*3/UL

## 2018-06-08 PROCEDURE — 94640 AIRWAY INHALATION TREATMENT: CPT

## 2018-06-08 PROCEDURE — 6370000000 HC RX 637 (ALT 250 FOR IP): Performed by: HOSPITALIST

## 2018-06-08 PROCEDURE — 80048 BASIC METABOLIC PNL TOTAL CA: CPT

## 2018-06-08 PROCEDURE — 81003 URINALYSIS AUTO W/O SCOPE: CPT

## 2018-06-08 PROCEDURE — 96375 TX/PRO/DX INJ NEW DRUG ADDON: CPT

## 2018-06-08 PROCEDURE — 85610 PROTHROMBIN TIME: CPT

## 2018-06-08 PROCEDURE — 72131 CT LUMBAR SPINE W/O DYE: CPT

## 2018-06-08 PROCEDURE — 72141 MRI NECK SPINE W/O DYE: CPT

## 2018-06-08 PROCEDURE — 70450 CT HEAD/BRAIN W/O DYE: CPT

## 2018-06-08 PROCEDURE — 72128 CT CHEST SPINE W/O DYE: CPT

## 2018-06-08 PROCEDURE — 85025 COMPLETE CBC W/AUTO DIFF WBC: CPT

## 2018-06-08 PROCEDURE — 72125 CT NECK SPINE W/O DYE: CPT

## 2018-06-08 PROCEDURE — 73080 X-RAY EXAM OF ELBOW: CPT

## 2018-06-08 PROCEDURE — 2060000000 HC ICU INTERMEDIATE R&B

## 2018-06-08 PROCEDURE — 96374 THER/PROPH/DIAG INJ IV PUSH: CPT

## 2018-06-08 PROCEDURE — 2580000003 HC RX 258: Performed by: INTERNAL MEDICINE

## 2018-06-08 PROCEDURE — 99285 EMERGENCY DEPT VISIT HI MDM: CPT

## 2018-06-08 PROCEDURE — G0480 DRUG TEST DEF 1-7 CLASSES: HCPCS

## 2018-06-08 PROCEDURE — 6360000002 HC RX W HCPCS: Performed by: EMERGENCY MEDICINE

## 2018-06-08 PROCEDURE — 94664 DEMO&/EVAL PT USE INHALER: CPT

## 2018-06-08 PROCEDURE — 6370000000 HC RX 637 (ALT 250 FOR IP): Performed by: INTERNAL MEDICINE

## 2018-06-08 RX ORDER — CARVEDILOL 12.5 MG/1
12.5 TABLET ORAL 2 TIMES DAILY
Status: DISCONTINUED | OUTPATIENT
Start: 2018-06-08 | End: 2018-06-09 | Stop reason: HOSPADM

## 2018-06-08 RX ORDER — FUROSEMIDE 20 MG/1
20 TABLET ORAL DAILY
Status: DISCONTINUED | OUTPATIENT
Start: 2018-06-09 | End: 2018-06-09 | Stop reason: HOSPADM

## 2018-06-08 RX ORDER — MORPHINE SULFATE 4 MG/ML
4 INJECTION, SOLUTION INTRAMUSCULAR; INTRAVENOUS ONCE
Status: COMPLETED | OUTPATIENT
Start: 2018-06-08 | End: 2018-06-08

## 2018-06-08 RX ORDER — TRAZODONE HYDROCHLORIDE 50 MG/1
50 TABLET ORAL NIGHTLY
Status: DISCONTINUED | OUTPATIENT
Start: 2018-06-08 | End: 2018-06-09 | Stop reason: HOSPADM

## 2018-06-08 RX ORDER — FLUOXETINE HYDROCHLORIDE 20 MG/1
40 CAPSULE ORAL DAILY
Status: DISCONTINUED | OUTPATIENT
Start: 2018-06-09 | End: 2018-06-09 | Stop reason: HOSPADM

## 2018-06-08 RX ORDER — BUPROPION HYDROCHLORIDE 150 MG/1
150 TABLET, EXTENDED RELEASE ORAL DAILY
Status: DISCONTINUED | OUTPATIENT
Start: 2018-06-09 | End: 2018-06-09 | Stop reason: HOSPADM

## 2018-06-08 RX ORDER — SODIUM CHLORIDE 0.9 % (FLUSH) 0.9 %
10 SYRINGE (ML) INJECTION PRN
Status: DISCONTINUED | OUTPATIENT
Start: 2018-06-08 | End: 2018-06-09 | Stop reason: HOSPADM

## 2018-06-08 RX ORDER — ACETAMINOPHEN 325 MG/1
650 TABLET ORAL EVERY 4 HOURS PRN
Status: DISCONTINUED | OUTPATIENT
Start: 2018-06-08 | End: 2018-06-09 | Stop reason: HOSPADM

## 2018-06-08 RX ORDER — SODIUM CHLORIDE 0.9 % (FLUSH) 0.9 %
10 SYRINGE (ML) INJECTION EVERY 12 HOURS SCHEDULED
Status: DISCONTINUED | OUTPATIENT
Start: 2018-06-08 | End: 2018-06-09 | Stop reason: HOSPADM

## 2018-06-08 RX ORDER — FENTANYL CITRATE 50 UG/ML
50 INJECTION, SOLUTION INTRAMUSCULAR; INTRAVENOUS ONCE
Status: COMPLETED | OUTPATIENT
Start: 2018-06-08 | End: 2018-06-08

## 2018-06-08 RX ADMIN — MOMETASONE FUROATE AND FORMOTEROL FUMARATE DIHYDRATE 2 PUFF: 100; 5 AEROSOL RESPIRATORY (INHALATION) at 22:38

## 2018-06-08 RX ADMIN — MORPHINE SULFATE 4 MG: 4 INJECTION INTRAVENOUS at 14:28

## 2018-06-08 RX ADMIN — CARVEDILOL 12.5 MG: 12.5 TABLET, FILM COATED ORAL at 22:12

## 2018-06-08 RX ADMIN — FENTANYL CITRATE 50 MCG: 50 INJECTION INTRAMUSCULAR; INTRAVENOUS at 12:57

## 2018-06-08 RX ADMIN — Medication 10 ML: at 22:12

## 2018-06-08 RX ADMIN — TRAZODONE HYDROCHLORIDE 50 MG: 50 TABLET ORAL at 22:12

## 2018-06-08 RX ADMIN — ACETAMINOPHEN 650 MG: 325 TABLET ORAL at 22:55

## 2018-06-08 ASSESSMENT — ENCOUNTER SYMPTOMS
ABDOMINAL PAIN: 0
NAUSEA: 0
CHEST TIGHTNESS: 0
BACK PAIN: 1
COLOR CHANGE: 0
PHOTOPHOBIA: 0
FACIAL SWELLING: 0
VOMITING: 0
SHORTNESS OF BREATH: 0
SINUS PAIN: 0

## 2018-06-08 ASSESSMENT — PAIN DESCRIPTION - ORIENTATION: ORIENTATION: LEFT

## 2018-06-08 ASSESSMENT — PAIN DESCRIPTION - LOCATION: LOCATION: HEAD

## 2018-06-08 ASSESSMENT — PAIN SCALES - GENERAL
PAINLEVEL_OUTOF10: 10
PAINLEVEL_OUTOF10: 10
PAINLEVEL_OUTOF10: 9

## 2018-06-08 ASSESSMENT — PAIN DESCRIPTION - PAIN TYPE
TYPE: ACUTE PAIN
TYPE: ACUTE PAIN

## 2018-06-08 ASSESSMENT — PAIN DESCRIPTION - DESCRIPTORS: DESCRIPTORS: DISCOMFORT

## 2018-06-09 ENCOUNTER — APPOINTMENT (OUTPATIENT)
Dept: MRI IMAGING | Age: 63
DRG: 052 | End: 2018-06-09
Payer: MEDICARE

## 2018-06-09 VITALS
BODY MASS INDEX: 33.32 KG/M2 | HEART RATE: 59 BPM | SYSTOLIC BLOOD PRESSURE: 96 MMHG | TEMPERATURE: 98 F | RESPIRATION RATE: 16 BRPM | DIASTOLIC BLOOD PRESSURE: 75 MMHG | WEIGHT: 200 LBS | HEIGHT: 65 IN | OXYGEN SATURATION: 98 %

## 2018-06-09 PROCEDURE — 6360000004 HC RX CONTRAST MEDICATION: Performed by: EMERGENCY MEDICINE

## 2018-06-09 PROCEDURE — A9579 GAD-BASE MR CONTRAST NOS,1ML: HCPCS | Performed by: EMERGENCY MEDICINE

## 2018-06-09 PROCEDURE — 6370000000 HC RX 637 (ALT 250 FOR IP): Performed by: HOSPITALIST

## 2018-06-09 PROCEDURE — 6370000000 HC RX 637 (ALT 250 FOR IP): Performed by: INTERNAL MEDICINE

## 2018-06-09 PROCEDURE — 72142 MRI NECK SPINE W/DYE: CPT

## 2018-06-09 PROCEDURE — 94762 N-INVAS EAR/PLS OXIMTRY CONT: CPT

## 2018-06-09 PROCEDURE — 99222 1ST HOSP IP/OBS MODERATE 55: CPT | Performed by: INTERNAL MEDICINE

## 2018-06-09 PROCEDURE — 94640 AIRWAY INHALATION TREATMENT: CPT

## 2018-06-09 PROCEDURE — 2580000003 HC RX 258: Performed by: INTERNAL MEDICINE

## 2018-06-09 RX ORDER — AMLODIPINE BESYLATE 5 MG/1
5 TABLET ORAL DAILY
Status: DISCONTINUED | OUTPATIENT
Start: 2018-06-09 | End: 2018-06-09 | Stop reason: HOSPADM

## 2018-06-09 RX ORDER — SODIUM CHLORIDE 0.9 % (FLUSH) 0.9 %
10 SYRINGE (ML) INJECTION PRN
Status: DISCONTINUED | OUTPATIENT
Start: 2018-06-09 | End: 2018-06-09 | Stop reason: HOSPADM

## 2018-06-09 RX ORDER — FUROSEMIDE 20 MG/1
20 TABLET ORAL DAILY
Qty: 60 TABLET | Refills: 3 | Status: SHIPPED | OUTPATIENT
Start: 2018-06-10 | End: 2019-01-01

## 2018-06-09 RX ADMIN — Medication 10 ML: at 09:24

## 2018-06-09 RX ADMIN — ACETAMINOPHEN 650 MG: 325 TABLET ORAL at 09:21

## 2018-06-09 RX ADMIN — FUROSEMIDE 20 MG: 20 TABLET ORAL at 09:18

## 2018-06-09 RX ADMIN — MOMETASONE FUROATE AND FORMOTEROL FUMARATE DIHYDRATE 2 PUFF: 100; 5 AEROSOL RESPIRATORY (INHALATION) at 08:34

## 2018-06-09 RX ADMIN — GADOTERIDOL 18 ML: 279.3 INJECTION, SOLUTION INTRAVENOUS at 03:06

## 2018-06-09 RX ADMIN — BUPROPION HYDROCHLORIDE 150 MG: 150 TABLET, EXTENDED RELEASE ORAL at 09:19

## 2018-06-09 RX ADMIN — AMLODIPINE BESYLATE 5 MG: 5 TABLET ORAL at 09:18

## 2018-06-09 RX ADMIN — FLUOXETINE HYDROCHLORIDE 40 MG: 20 CAPSULE ORAL at 09:18

## 2018-06-09 RX ADMIN — CARVEDILOL 12.5 MG: 12.5 TABLET, FILM COATED ORAL at 09:19

## 2018-06-09 RX ADMIN — ACETAMINOPHEN 650 MG: 325 TABLET ORAL at 14:05

## 2018-06-09 ASSESSMENT — PAIN SCALES - GENERAL
PAINLEVEL_OUTOF10: 7
PAINLEVEL_OUTOF10: 7

## 2018-06-14 DIAGNOSIS — S82.831D CLOSED FRACTURE OF DISTAL END OF RIGHT FIBULA WITH ROUTINE HEALING, UNSPECIFIED FRACTURE MORPHOLOGY, SUBSEQUENT ENCOUNTER: Primary | ICD-10-CM

## 2018-06-15 ENCOUNTER — OFFICE VISIT (OUTPATIENT)
Dept: ORTHOPEDIC SURGERY | Age: 63
End: 2018-06-15
Payer: MEDICARE

## 2018-06-15 VITALS — HEIGHT: 65 IN | WEIGHT: 200 LBS | BODY MASS INDEX: 33.32 KG/M2

## 2018-06-15 DIAGNOSIS — S82.831D CLOSED FRACTURE OF DISTAL END OF RIGHT FIBULA WITH ROUTINE HEALING, UNSPECIFIED FRACTURE MORPHOLOGY, SUBSEQUENT ENCOUNTER: Primary | ICD-10-CM

## 2018-06-15 PROCEDURE — G8427 DOCREV CUR MEDS BY ELIG CLIN: HCPCS | Performed by: ORTHOPAEDIC SURGERY

## 2018-06-15 PROCEDURE — 1090F PRES/ABSN URINE INCON ASSESS: CPT | Performed by: ORTHOPAEDIC SURGERY

## 2018-06-15 PROCEDURE — 1111F DSCHRG MED/CURRENT MED MERGE: CPT | Performed by: ORTHOPAEDIC SURGERY

## 2018-06-15 PROCEDURE — 4040F PNEUMOC VAC/ADMIN/RCVD: CPT | Performed by: ORTHOPAEDIC SURGERY

## 2018-06-15 PROCEDURE — G8399 PT W/DXA RESULTS DOCUMENT: HCPCS | Performed by: ORTHOPAEDIC SURGERY

## 2018-06-15 PROCEDURE — 1123F ACP DISCUSS/DSCN MKR DOCD: CPT | Performed by: ORTHOPAEDIC SURGERY

## 2018-06-15 PROCEDURE — G8417 CALC BMI ABV UP PARAM F/U: HCPCS | Performed by: ORTHOPAEDIC SURGERY

## 2018-06-15 PROCEDURE — 3017F COLORECTAL CA SCREEN DOC REV: CPT | Performed by: ORTHOPAEDIC SURGERY

## 2018-06-15 PROCEDURE — 99213 OFFICE O/P EST LOW 20 MIN: CPT | Performed by: ORTHOPAEDIC SURGERY

## 2018-06-15 PROCEDURE — 4004F PT TOBACCO SCREEN RCVD TLK: CPT | Performed by: ORTHOPAEDIC SURGERY

## 2018-06-15 ASSESSMENT — ENCOUNTER SYMPTOMS
BACK PAIN: 0
WHEEZING: 0
SHORTNESS OF BREATH: 0

## 2018-06-20 ENCOUNTER — OFFICE VISIT (OUTPATIENT)
Dept: PRIMARY CARE CLINIC | Age: 63
End: 2018-06-20
Payer: MEDICARE

## 2018-06-20 VITALS
DIASTOLIC BLOOD PRESSURE: 80 MMHG | HEART RATE: 76 BPM | OXYGEN SATURATION: 94 % | SYSTOLIC BLOOD PRESSURE: 138 MMHG | RESPIRATION RATE: 20 BRPM

## 2018-06-20 DIAGNOSIS — M79.604 RIGHT LEG PAIN: ICD-10-CM

## 2018-06-20 DIAGNOSIS — S82.831D CLOSED FRACTURE OF DISTAL END OF RIGHT FIBULA WITH ROUTINE HEALING, UNSPECIFIED FRACTURE MORPHOLOGY, SUBSEQUENT ENCOUNTER: ICD-10-CM

## 2018-06-20 DIAGNOSIS — S00.03XD HEMATOMA OF SCALP, SUBSEQUENT ENCOUNTER: ICD-10-CM

## 2018-06-20 DIAGNOSIS — R42 DIZZINESS, NONSPECIFIC: Primary | ICD-10-CM

## 2018-06-20 PROCEDURE — 4040F PNEUMOC VAC/ADMIN/RCVD: CPT | Performed by: INTERNAL MEDICINE

## 2018-06-20 PROCEDURE — 1123F ACP DISCUSS/DSCN MKR DOCD: CPT | Performed by: INTERNAL MEDICINE

## 2018-06-20 PROCEDURE — 99214 OFFICE O/P EST MOD 30 MIN: CPT | Performed by: INTERNAL MEDICINE

## 2018-06-20 PROCEDURE — 3017F COLORECTAL CA SCREEN DOC REV: CPT | Performed by: INTERNAL MEDICINE

## 2018-06-20 PROCEDURE — 4004F PT TOBACCO SCREEN RCVD TLK: CPT | Performed by: INTERNAL MEDICINE

## 2018-06-20 PROCEDURE — 1111F DSCHRG MED/CURRENT MED MERGE: CPT | Performed by: INTERNAL MEDICINE

## 2018-06-20 PROCEDURE — 1090F PRES/ABSN URINE INCON ASSESS: CPT | Performed by: INTERNAL MEDICINE

## 2018-06-20 PROCEDURE — G8417 CALC BMI ABV UP PARAM F/U: HCPCS | Performed by: INTERNAL MEDICINE

## 2018-06-20 PROCEDURE — G8427 DOCREV CUR MEDS BY ELIG CLIN: HCPCS | Performed by: INTERNAL MEDICINE

## 2018-06-20 PROCEDURE — G8399 PT W/DXA RESULTS DOCUMENT: HCPCS | Performed by: INTERNAL MEDICINE

## 2018-06-20 RX ORDER — TRAMADOL HYDROCHLORIDE 50 MG/1
50 TABLET ORAL EVERY 8 HOURS PRN
Qty: 30 TABLET | Refills: 0 | Status: SHIPPED | OUTPATIENT
Start: 2018-06-20 | End: 2018-12-03 | Stop reason: SDUPTHER

## 2018-06-20 RX ORDER — MECLIZINE HYDROCHLORIDE 25 MG/1
25 TABLET ORAL 3 TIMES DAILY PRN
Qty: 60 TABLET | Refills: 1 | Status: SHIPPED | OUTPATIENT
Start: 2018-06-20 | End: 2018-06-30

## 2018-06-20 ASSESSMENT — PATIENT HEALTH QUESTIONNAIRE - PHQ9
1. LITTLE INTEREST OR PLEASURE IN DOING THINGS: 0
2. FEELING DOWN, DEPRESSED OR HOPELESS: 0
SUM OF ALL RESPONSES TO PHQ9 QUESTIONS 1 & 2: 0
SUM OF ALL RESPONSES TO PHQ QUESTIONS 1-9: 0

## 2018-06-23 ASSESSMENT — ENCOUNTER SYMPTOMS
COUGH: 0
ABDOMINAL PAIN: 0
VOMITING: 0
EYE DISCHARGE: 0
NAUSEA: 0
SHORTNESS OF BREATH: 0
EYE REDNESS: 0
TROUBLE SWALLOWING: 0
SORE THROAT: 0
BACK PAIN: 0

## 2018-07-13 DIAGNOSIS — S82.831D CLOSED FRACTURE OF DISTAL END OF RIGHT FIBULA WITH ROUTINE HEALING, UNSPECIFIED FRACTURE MORPHOLOGY, SUBSEQUENT ENCOUNTER: Primary | ICD-10-CM

## 2018-07-18 ENCOUNTER — OFFICE VISIT (OUTPATIENT)
Dept: ORTHOPEDIC SURGERY | Age: 63
End: 2018-07-18
Payer: MEDICARE

## 2018-07-18 DIAGNOSIS — S82.831D CLOSED FRACTURE OF DISTAL END OF RIGHT FIBULA WITH ROUTINE HEALING, UNSPECIFIED FRACTURE MORPHOLOGY, SUBSEQUENT ENCOUNTER: Primary | ICD-10-CM

## 2018-07-18 PROCEDURE — 1090F PRES/ABSN URINE INCON ASSESS: CPT | Performed by: ORTHOPAEDIC SURGERY

## 2018-07-18 PROCEDURE — 1123F ACP DISCUSS/DSCN MKR DOCD: CPT | Performed by: ORTHOPAEDIC SURGERY

## 2018-07-18 PROCEDURE — 1101F PT FALLS ASSESS-DOCD LE1/YR: CPT | Performed by: ORTHOPAEDIC SURGERY

## 2018-07-18 PROCEDURE — 3017F COLORECTAL CA SCREEN DOC REV: CPT | Performed by: ORTHOPAEDIC SURGERY

## 2018-07-18 PROCEDURE — G8399 PT W/DXA RESULTS DOCUMENT: HCPCS | Performed by: ORTHOPAEDIC SURGERY

## 2018-07-18 PROCEDURE — G8417 CALC BMI ABV UP PARAM F/U: HCPCS | Performed by: ORTHOPAEDIC SURGERY

## 2018-07-18 PROCEDURE — G8427 DOCREV CUR MEDS BY ELIG CLIN: HCPCS | Performed by: ORTHOPAEDIC SURGERY

## 2018-07-18 PROCEDURE — 99213 OFFICE O/P EST LOW 20 MIN: CPT | Performed by: ORTHOPAEDIC SURGERY

## 2018-07-18 PROCEDURE — 4040F PNEUMOC VAC/ADMIN/RCVD: CPT | Performed by: ORTHOPAEDIC SURGERY

## 2018-07-18 PROCEDURE — 4004F PT TOBACCO SCREEN RCVD TLK: CPT | Performed by: ORTHOPAEDIC SURGERY

## 2018-07-18 ASSESSMENT — ENCOUNTER SYMPTOMS
COUGH: 0
NAUSEA: 0
CONSTIPATION: 0
DIARRHEA: 0

## 2018-07-19 ENCOUNTER — TELEPHONE (OUTPATIENT)
Dept: PRIMARY CARE CLINIC | Age: 63
End: 2018-07-19

## 2018-07-20 NOTE — TELEPHONE ENCOUNTER
Can you ask Pt to check with insurance , docs covered under her insurance for neurology and ENT referrals . Thanks.
congestive heart failure (HCC)     Arthritis     Simple chronic bronchitis (HCC)     Closed fracture of right distal fibula     Injury at C3 level of cervical spinal cord (HCC)     Depression

## 2018-08-14 RX ORDER — BUDESONIDE AND FORMOTEROL FUMARATE DIHYDRATE 160; 4.5 UG/1; UG/1
AEROSOL RESPIRATORY (INHALATION)
Qty: 10.2 G | Refills: 2 | Status: SHIPPED | OUTPATIENT
Start: 2018-08-14 | End: 2019-03-26 | Stop reason: SDUPTHER

## 2018-08-14 NOTE — TELEPHONE ENCOUNTER
Last OV 06/20/18      Health Maintenance   Topic Date Due    DTaP/Tdap/Td vaccine (1 - Tdap) 01/25/1972    Diabetes screen  01/25/1993    Colon cancer screen colonoscopy  01/25/2003    Pneumococcal low/med risk (2 of 2 - PPSV23) 07/13/2018    Shingles Vaccine (1 of 2 - 2 Dose Series) 03/14/2019 (Originally 1/25/2003)    Flu vaccine (1) 03/21/2019 (Originally 9/1/2018)    Potassium monitoring  06/08/2019    Creatinine monitoring  06/08/2019    Breast cancer screen  08/17/2019    Lipid screen  05/16/2023    DEXA (modify frequency per FRAX score)  Completed    Hepatitis C screen  Completed    HIV screen  Completed             (applicable per patient's age: Cancer Screenings, Depression Screening, Fall Risk Screening, Immunizations)    LDL Calculated (mg/dL)   Date Value   05/16/2018 136     BUN (mg/dL)   Date Value   06/08/2018 12      (goal A1C is < 7)   (goal LDL is <100) need 30-50% reduction from baseline     BP Readings from Last 3 Encounters:   06/20/18 138/80   06/09/18 96/75   05/24/18 (!) 148/93    (goal /80)      All Future Testing planned in [facility]:  Lab Frequency Next Occurrence   Hepatitis C Antibody Once 09/19/2017   HIV Screen Once 10/19/2017   TSH With Reflex Ft4 Once 09/19/2017   C-Reactive Protein Once 07/02/2018       Next Visit Date:  Future Appointments  Date Time Provider Marianela Renner   9/21/2018 9:00 AM [facility]            Patient Active Problem List:     Essential hypertension     Chronic systolic congestive heart failure (HCC)     Arthritis     Simple chronic bronchitis (HCC)     Closed fracture of right distal fibula     Injury at C3 level of cervical spinal cord ([facility] [address]     Depression

## 2018-08-17 ENCOUNTER — TELEPHONE (OUTPATIENT)
Dept: PRIMARY CARE CLINIC | Age: 63
End: 2018-08-17

## 2018-08-17 DIAGNOSIS — I50.22 CHRONIC SYSTOLIC CONGESTIVE HEART FAILURE (HCC): Primary | ICD-10-CM

## 2018-08-17 DIAGNOSIS — S14.109D CONTUSION OF CERVICAL CORD, SUBSEQUENT ENCOUNTER (HCC): ICD-10-CM

## 2018-08-17 DIAGNOSIS — R42 VERTIGO: ICD-10-CM

## 2018-08-29 ENCOUNTER — TELEPHONE (OUTPATIENT)
Dept: PRIMARY CARE CLINIC | Age: 63
End: 2018-08-29

## 2018-08-29 NOTE — TELEPHONE ENCOUNTER
Obdulia Boston from [facility] office called requesting referral, demo, notes, and any testing be faxed to her at [phone]. Writer faxed all info.

## 2018-11-12 RX ORDER — CARVEDILOL 25 MG/1
TABLET ORAL
Qty: 60 TABLET | Refills: 3 | Status: SHIPPED | OUTPATIENT
Start: 2018-11-12 | End: 2019-06-12 | Stop reason: SDUPTHER

## 2018-11-15 RX ORDER — CARVEDILOL 25 MG/1
TABLET ORAL
Qty: 60 TABLET | Refills: 3 | Status: SHIPPED | OUTPATIENT
Start: 2018-11-15 | End: 2019-02-19 | Stop reason: SDUPTHER

## 2018-12-03 ENCOUNTER — OFFICE VISIT (OUTPATIENT)
Dept: PRIMARY CARE CLINIC | Age: 63
End: 2018-12-03
Payer: MEDICARE

## 2018-12-03 VITALS
DIASTOLIC BLOOD PRESSURE: 82 MMHG | BODY MASS INDEX: 32.45 KG/M2 | WEIGHT: 195 LBS | HEART RATE: 96 BPM | SYSTOLIC BLOOD PRESSURE: 140 MMHG | RESPIRATION RATE: 16 BRPM

## 2018-12-03 DIAGNOSIS — M51.36 DEGENERATIVE DISC DISEASE, LUMBAR: ICD-10-CM

## 2018-12-03 DIAGNOSIS — M54.2 CHRONIC MIDLINE POSTERIOR NECK PAIN: ICD-10-CM

## 2018-12-03 DIAGNOSIS — M54.42 CHRONIC MIDLINE LOW BACK PAIN WITH LEFT-SIDED SCIATICA: Primary | ICD-10-CM

## 2018-12-03 DIAGNOSIS — G89.29 CHRONIC MIDLINE LOW BACK PAIN WITH LEFT-SIDED SCIATICA: Primary | ICD-10-CM

## 2018-12-03 DIAGNOSIS — G89.29 CHRONIC MIDLINE POSTERIOR NECK PAIN: ICD-10-CM

## 2018-12-03 DIAGNOSIS — S82.831D CLOSED FRACTURE OF DISTAL END OF RIGHT FIBULA WITH ROUTINE HEALING, UNSPECIFIED FRACTURE MORPHOLOGY, SUBSEQUENT ENCOUNTER: ICD-10-CM

## 2018-12-03 DIAGNOSIS — Z12.11 SCREEN FOR COLON CANCER: ICD-10-CM

## 2018-12-03 DIAGNOSIS — M79.604 RIGHT LEG PAIN: ICD-10-CM

## 2018-12-03 DIAGNOSIS — J41.1 MUCOPURULENT CHRONIC BRONCHITIS (HCC): ICD-10-CM

## 2018-12-03 DIAGNOSIS — Z23 NEED FOR PNEUMOCOCCAL VACCINATION: ICD-10-CM

## 2018-12-03 PROCEDURE — 3023F SPIROM DOC REV: CPT | Performed by: INTERNAL MEDICINE

## 2018-12-03 PROCEDURE — 1123F ACP DISCUSS/DSCN MKR DOCD: CPT | Performed by: INTERNAL MEDICINE

## 2018-12-03 PROCEDURE — G8417 CALC BMI ABV UP PARAM F/U: HCPCS | Performed by: INTERNAL MEDICINE

## 2018-12-03 PROCEDURE — 99214 OFFICE O/P EST MOD 30 MIN: CPT | Performed by: INTERNAL MEDICINE

## 2018-12-03 PROCEDURE — G0009 ADMIN PNEUMOCOCCAL VACCINE: HCPCS | Performed by: INTERNAL MEDICINE

## 2018-12-03 PROCEDURE — 4040F PNEUMOC VAC/ADMIN/RCVD: CPT | Performed by: INTERNAL MEDICINE

## 2018-12-03 PROCEDURE — 90732 PPSV23 VACC 2 YRS+ SUBQ/IM: CPT | Performed by: INTERNAL MEDICINE

## 2018-12-03 PROCEDURE — G8926 SPIRO NO PERF OR DOC: HCPCS | Performed by: INTERNAL MEDICINE

## 2018-12-03 PROCEDURE — 1101F PT FALLS ASSESS-DOCD LE1/YR: CPT | Performed by: INTERNAL MEDICINE

## 2018-12-03 PROCEDURE — 3017F COLORECTAL CA SCREEN DOC REV: CPT | Performed by: INTERNAL MEDICINE

## 2018-12-03 PROCEDURE — G8484 FLU IMMUNIZE NO ADMIN: HCPCS | Performed by: INTERNAL MEDICINE

## 2018-12-03 PROCEDURE — G8399 PT W/DXA RESULTS DOCUMENT: HCPCS | Performed by: INTERNAL MEDICINE

## 2018-12-03 PROCEDURE — 4004F PT TOBACCO SCREEN RCVD TLK: CPT | Performed by: INTERNAL MEDICINE

## 2018-12-03 PROCEDURE — G8427 DOCREV CUR MEDS BY ELIG CLIN: HCPCS | Performed by: INTERNAL MEDICINE

## 2018-12-03 PROCEDURE — 1090F PRES/ABSN URINE INCON ASSESS: CPT | Performed by: INTERNAL MEDICINE

## 2018-12-03 RX ORDER — TRAMADOL HYDROCHLORIDE 50 MG/1
50 TABLET ORAL EVERY 8 HOURS PRN
Qty: 30 TABLET | Refills: 0 | Status: SHIPPED | OUTPATIENT
Start: 2018-12-03 | End: 2019-02-19 | Stop reason: SDUPTHER

## 2018-12-03 ASSESSMENT — ENCOUNTER SYMPTOMS
EYE DISCHARGE: 0
SORE THROAT: 0
NAUSEA: 0
SHORTNESS OF BREATH: 0
VOMITING: 0
BACK PAIN: 1
EYE REDNESS: 0
TROUBLE SWALLOWING: 0
COUGH: 0
ABDOMINAL PAIN: 0

## 2018-12-03 NOTE — PATIENT INSTRUCTIONS
Patient Education   Patient Education        Neck Pain: Care Instructions  Your Care Instructions    You can have neck pain anywhere from the bottom of your head to the top of your shoulders. It can spread to the upper back or arms. Injuries, painting a ceiling, sleeping with your neck twisted, staying in one position for too long, and many other activities can cause neck pain. Most neck pain gets better with home care. Your doctor may recommend medicine to relieve pain or relax your muscles. He or she may suggest exercise and physical therapy to increase flexibility and relieve stress. You may need to wear a special (cervical) collar to support your neck for a day or two. Follow-up care is a key part of your treatment and safety. Be sure to make and go to all appointments, and call your doctor if you are having problems. It's also a good idea to know your test results and keep a list of the medicines you take. How can you care for yourself at home? · Try using a heating pad on a low or medium setting for 15 to 20 minutes every 2 or 3 hours. Try a warm shower in place of one session with the heating pad. · You can also try an ice pack for 10 to 15 minutes every 2 to 3 hours. Put a thin cloth between the ice and your skin. · Take pain medicines exactly as directed. ? If the doctor gave you a prescription medicine for pain, take it as prescribed. ? If you are not taking a prescription pain medicine, ask your doctor if you can take an over-the-counter medicine. · If your doctor recommends a cervical collar, wear it exactly as directed. When should you call for help? Call your doctor now or seek immediate medical care if:    · You have new or worsening numbness in your arms, buttocks or legs.     · You have new or worsening weakness in your arms or legs.  (This could make it hard to stand up.)     · You lose control of your bladder or bowels.    Watch closely for changes in your health, and be sure to contact the floor with your legs on the seat of a sofa or chair. ? Lie on your side with your knees and hips bent and a pillow between your legs. ? Lie on your stomach if it does not make pain worse. · Do not sit up in bed, and avoid soft couches and twisted positions. Bed rest can help relieve pain at first, but it delays healing. Avoid bed rest after the first day of back pain. · Change positions every 30 minutes. If you must sit for long periods of time, take breaks from sitting. Get up and walk around, or lie in a comfortable position. · Try using a heating pad on a low or medium setting for 15 to 20 minutes every 2 or 3 hours. Try a warm shower in place of one session with the heating pad. · You can also try an ice pack for 10 to 15 minutes every 2 to 3 hours. Put a thin cloth between the ice pack and your skin. · Take pain medicines exactly as directed. ? If the doctor gave you a prescription medicine for pain, take it as prescribed. ? If you are not taking a prescription pain medicine, ask your doctor if you can take an over-the-counter medicine. · Take short walks several times a day. You can start with 5 to 10 minutes, 3 or 4 times a day, and work up to longer walks. Walk on level surfaces and avoid hills and stairs until your back is better. · Return to work and other activities as soon as you can. Continued rest without activity is usually not good for your back. · To prevent future back pain, do exercises to stretch and strengthen your back and stomach. Learn how to use good posture, safe lifting techniques, and proper body mechanics. When should you call for help? Call your doctor now or seek immediate medical care if:    · You have new or worsening numbness in your legs.     · You have new or worsening weakness in your legs.  (This could make it hard to stand up.)     · You lose control of your bladder or bowels.    Watch closely for changes in your health, and be sure to contact your doctor

## 2018-12-03 NOTE — LETTER
[address]  [address]  [facility] [address]  Phone: [phone]  Fax: [phone]    Bridget Neri MD        December 3, 2018     Patient: Ramu Umanzor   YOB: 1955   Date of Visit: 12/3/2018       To Whom It May Concern: It is my medical opinion that Ramu Umanzor requires a disability parking placard for the following reasons:  She has limited walking ability due to an orthopedic condition. Duration of need: 5 years    If you have any questions or concerns, please don't hesitate to call.     Sincerely,        Bridget Neri MD
counselor to share the information obtained from this assessment with providers within [facility] as necessary for coordination of care and ongoing pain management.   I agree to follow the guidelines and responsibilities as given above.      ________________________________ ________________________  Ki Louis      Date and Time      ________________________________ _________________________  Rajani Jasper      Date and Time                                                                                                                                                       Category 7 - [id number] Margot Recioer 12/3/2018

## 2018-12-03 NOTE — PROGRESS NOTES
given educational materials - see patient instructions. Discussed use, benefit, and side effects of prescribed medications. All patient questions answered. Pt voiced understanding. Reviewed healthmaintenance. Instructed to continue current medications, diet and exercise. Patient agreed with treatment plan. Follow up as directed.      Electronically signed by Lamberto Arce MD on 12/3/2018 at 2:02 PM

## 2018-12-10 RX ORDER — TRAZODONE HYDROCHLORIDE 50 MG/1
TABLET ORAL
Qty: 30 TABLET | Refills: 5 | Status: SHIPPED | OUTPATIENT
Start: 2018-12-10 | End: 2019-01-01 | Stop reason: SDUPTHER

## 2019-01-01 ENCOUNTER — TELEPHONE (OUTPATIENT)
Dept: ONCOLOGY | Age: 64
End: 2019-01-01

## 2019-01-01 ENCOUNTER — OFFICE VISIT (OUTPATIENT)
Dept: PRIMARY CARE CLINIC | Age: 64
End: 2019-01-01
Payer: MEDICARE

## 2019-01-01 ENCOUNTER — APPOINTMENT (OUTPATIENT)
Dept: RADIATION ONCOLOGY | Age: 64
End: 2019-01-01
Payer: MEDICARE

## 2019-01-01 ENCOUNTER — HOSPITAL ENCOUNTER (OUTPATIENT)
Dept: RADIATION ONCOLOGY | Age: 64
End: 2019-01-01
Attending: RADIOLOGY
Payer: MEDICARE

## 2019-01-01 ENCOUNTER — HOSPITAL ENCOUNTER (OUTPATIENT)
Dept: NUCLEAR MEDICINE | Age: 64
Discharge: HOME | End: 2019-12-01
Payer: MEDICARE

## 2019-01-01 ENCOUNTER — HOSPITAL ENCOUNTER (OUTPATIENT)
Age: 64
Discharge: HOME | End: 2019-12-13
Payer: MEDICARE

## 2019-01-01 ENCOUNTER — HOSPITAL ENCOUNTER (OUTPATIENT)
Dept: GENERAL RADIOLOGY | Age: 64
Discharge: HOME | End: 2019-12-19
Payer: MEDICARE

## 2019-01-01 ENCOUNTER — HOSPITAL ENCOUNTER (OUTPATIENT)
Dept: RADIATION ONCOLOGY | Age: 64
Discharge: HOME | End: 2019-11-22
Payer: MEDICARE

## 2019-01-01 ENCOUNTER — TELEPHONE (OUTPATIENT)
Dept: INTERVENTIONAL RADIOLOGY/VASCULAR | Age: 64
End: 2019-01-01

## 2019-01-01 ENCOUNTER — HOSPITAL ENCOUNTER (OUTPATIENT)
Dept: CT IMAGING | Age: 64
Discharge: HOME | End: 2019-12-19
Payer: MEDICARE

## 2019-01-01 ENCOUNTER — HOSPITAL ENCOUNTER (OUTPATIENT)
Dept: RADIATION ONCOLOGY | Age: 64
Discharge: HOME | End: 2019-12-13
Payer: MEDICARE

## 2019-01-01 ENCOUNTER — HOSPITAL ENCOUNTER (OUTPATIENT)
Dept: GENERAL RADIOLOGY | Age: 64
Discharge: HOME | End: 2019-07-24
Payer: MEDICARE

## 2019-01-01 ENCOUNTER — TELEPHONE (OUTPATIENT)
Dept: PRIMARY CARE CLINIC | Age: 64
End: 2019-01-01

## 2019-01-01 ENCOUNTER — HOSPITAL ENCOUNTER (OUTPATIENT)
Age: 64
Discharge: HOME | End: 2019-09-17
Payer: MEDICARE

## 2019-01-01 ENCOUNTER — OFFICE VISIT (OUTPATIENT)
Dept: ONCOLOGY | Age: 64
End: 2019-01-01
Payer: MEDICARE

## 2019-01-01 ENCOUNTER — HOSPITAL ENCOUNTER (OUTPATIENT)
Dept: CT IMAGING | Age: 64
Discharge: HOME | End: 2019-12-07
Payer: MEDICARE

## 2019-01-01 ENCOUNTER — HOSPITAL ENCOUNTER (OUTPATIENT)
Age: 64
Discharge: HOME | End: 2019-07-24
Payer: MEDICARE

## 2019-01-01 ENCOUNTER — INITIAL CONSULT (OUTPATIENT)
Dept: ONCOLOGY | Age: 64
End: 2019-01-01
Payer: MEDICARE

## 2019-01-01 ENCOUNTER — TELEPHONE (OUTPATIENT)
Dept: SURGERY | Age: 64
End: 2019-01-01

## 2019-01-01 ENCOUNTER — HOSPITAL ENCOUNTER (OUTPATIENT)
Dept: RADIATION ONCOLOGY | Age: 64
Discharge: HOME | End: 2019-12-20
Payer: MEDICARE

## 2019-01-01 ENCOUNTER — HOSPITAL ENCOUNTER (OUTPATIENT)
Age: 64
Discharge: HOME | End: 2019-07-22
Payer: MEDICARE

## 2019-01-01 VITALS
WEIGHT: 193.3 LBS | DIASTOLIC BLOOD PRESSURE: 78 MMHG | BODY MASS INDEX: 32.67 KG/M2 | SYSTOLIC BLOOD PRESSURE: 137 MMHG | TEMPERATURE: 98.7 F | HEART RATE: 63 BPM

## 2019-01-01 VITALS
BODY MASS INDEX: 30.29 KG/M2 | HEART RATE: 70 BPM | SYSTOLIC BLOOD PRESSURE: 161 MMHG | OXYGEN SATURATION: 95 % | DIASTOLIC BLOOD PRESSURE: 86 MMHG | TEMPERATURE: 97.2 F | HEIGHT: 65 IN | WEIGHT: 181.8 LBS | RESPIRATION RATE: 16 BRPM

## 2019-01-01 VITALS
HEART RATE: 69 BPM | RESPIRATION RATE: 18 BRPM | SYSTOLIC BLOOD PRESSURE: 138 MMHG | HEIGHT: 65 IN | OXYGEN SATURATION: 96 % | DIASTOLIC BLOOD PRESSURE: 86 MMHG | WEIGHT: 179.9 LBS | BODY MASS INDEX: 29.97 KG/M2

## 2019-01-01 VITALS
RESPIRATION RATE: 18 BRPM | BODY MASS INDEX: 30.76 KG/M2 | TEMPERATURE: 98 F | HEART RATE: 67 BPM | DIASTOLIC BLOOD PRESSURE: 88 MMHG | WEIGHT: 182 LBS | SYSTOLIC BLOOD PRESSURE: 144 MMHG | OXYGEN SATURATION: 98 %

## 2019-01-01 VITALS
BODY MASS INDEX: 32.68 KG/M2 | OXYGEN SATURATION: 97 % | SYSTOLIC BLOOD PRESSURE: 137 MMHG | DIASTOLIC BLOOD PRESSURE: 78 MMHG | WEIGHT: 193.4 LBS | TEMPERATURE: 98.7 F | HEART RATE: 63 BPM | RESPIRATION RATE: 16 BRPM

## 2019-01-01 VITALS
BODY MASS INDEX: 30.76 KG/M2 | SYSTOLIC BLOOD PRESSURE: 144 MMHG | WEIGHT: 182 LBS | HEART RATE: 67 BPM | DIASTOLIC BLOOD PRESSURE: 88 MMHG | TEMPERATURE: 98 F

## 2019-01-01 VITALS
SYSTOLIC BLOOD PRESSURE: 122 MMHG | DIASTOLIC BLOOD PRESSURE: 76 MMHG | WEIGHT: 198.19 LBS | OXYGEN SATURATION: 93 % | HEIGHT: 65 IN | RESPIRATION RATE: 18 BRPM | HEART RATE: 63 BPM | BODY MASS INDEX: 33.02 KG/M2

## 2019-01-01 VITALS
TEMPERATURE: 98 F | HEIGHT: 65 IN | OXYGEN SATURATION: 92 % | RESPIRATION RATE: 16 BRPM | BODY MASS INDEX: 30.16 KG/M2 | DIASTOLIC BLOOD PRESSURE: 54 MMHG | HEART RATE: 84 BPM | SYSTOLIC BLOOD PRESSURE: 140 MMHG | WEIGHT: 181 LBS

## 2019-01-01 VITALS
DIASTOLIC BLOOD PRESSURE: 63 MMHG | SYSTOLIC BLOOD PRESSURE: 124 MMHG | BODY MASS INDEX: 30.34 KG/M2 | RESPIRATION RATE: 16 BRPM | TEMPERATURE: 98.6 F | HEART RATE: 67 BPM | WEIGHT: 182.3 LBS

## 2019-01-01 VITALS
OXYGEN SATURATION: 96 % | DIASTOLIC BLOOD PRESSURE: 88 MMHG | SYSTOLIC BLOOD PRESSURE: 128 MMHG | BODY MASS INDEX: 29.99 KG/M2 | RESPIRATION RATE: 18 BRPM | HEIGHT: 65 IN | WEIGHT: 180 LBS | HEART RATE: 61 BPM

## 2019-01-01 VITALS
SYSTOLIC BLOOD PRESSURE: 124 MMHG | BODY MASS INDEX: 30.34 KG/M2 | DIASTOLIC BLOOD PRESSURE: 63 MMHG | HEART RATE: 67 BPM | TEMPERATURE: 98.6 F | OXYGEN SATURATION: 98 % | WEIGHT: 182.3 LBS | RESPIRATION RATE: 16 BRPM

## 2019-01-01 DIAGNOSIS — R73.01 IMPAIRED FASTING GLUCOSE: ICD-10-CM

## 2019-01-01 DIAGNOSIS — J44.9 CHRONIC OBSTRUCTIVE PULMONARY DISEASE, UNSPECIFIED COPD TYPE (HCC): ICD-10-CM

## 2019-01-01 DIAGNOSIS — C82.51 DIFFUSE FOLLICLE CENTER LYMPHOMA OF LYMPH NODES OF NECK (HCC): ICD-10-CM

## 2019-01-01 DIAGNOSIS — R06.00 DYSPNEA, UNSPECIFIED TYPE: ICD-10-CM

## 2019-01-01 DIAGNOSIS — C82.51 DIFFUSE FOLLICLE CENTER LYMPHOMA OF LYMPH NODES OF NECK (HCC): Primary | ICD-10-CM

## 2019-01-01 DIAGNOSIS — M54.9 BACK PAIN, UNSPECIFIED BACK LOCATION, UNSPECIFIED BACK PAIN LATERALITY, UNSPECIFIED CHRONICITY: ICD-10-CM

## 2019-01-01 DIAGNOSIS — R53.83 FATIGUE, UNSPECIFIED TYPE: ICD-10-CM

## 2019-01-01 DIAGNOSIS — K11.8 PAROTID MASS: ICD-10-CM

## 2019-01-01 DIAGNOSIS — C76.0 HEAD AND NECK CANCER (HCC): ICD-10-CM

## 2019-01-01 DIAGNOSIS — Z12.11 COLON CANCER SCREENING: ICD-10-CM

## 2019-01-01 DIAGNOSIS — R06.00 DYSPNEA, UNSPECIFIED TYPE: Primary | ICD-10-CM

## 2019-01-01 DIAGNOSIS — I10 ESSENTIAL HYPERTENSION: ICD-10-CM

## 2019-01-01 DIAGNOSIS — Z11.59 ENCOUNTER FOR SCREENING FOR OTHER VIRAL DISEASES: ICD-10-CM

## 2019-01-01 DIAGNOSIS — G47.00 INSOMNIA, UNSPECIFIED TYPE: ICD-10-CM

## 2019-01-01 DIAGNOSIS — R91.8 LUNG MASS: ICD-10-CM

## 2019-01-01 DIAGNOSIS — E78.5 HYPERLIPIDEMIA, UNSPECIFIED HYPERLIPIDEMIA TYPE: ICD-10-CM

## 2019-01-01 DIAGNOSIS — F32.A DEPRESSION, UNSPECIFIED DEPRESSION TYPE: ICD-10-CM

## 2019-01-01 DIAGNOSIS — Z00.00 ROUTINE GENERAL MEDICAL EXAMINATION AT A HEALTH CARE FACILITY: Primary | ICD-10-CM

## 2019-01-01 DIAGNOSIS — C82.51 DIFFUSE FOLLICLE CENTER LYMPHOMA OF LYMPH NODES OF HEAD (HCC): ICD-10-CM

## 2019-01-01 DIAGNOSIS — K11.6 CYST OF PAROTID GLAND: ICD-10-CM

## 2019-01-01 DIAGNOSIS — F32.A DEPRESSION, UNSPECIFIED DEPRESSION TYPE: Primary | ICD-10-CM

## 2019-01-01 DIAGNOSIS — Z23 FLU VACCINE NEED: ICD-10-CM

## 2019-01-01 LAB
ABSOLUTE EOS #: 0.15 K/UL (ref 0–0.44)
ABSOLUTE EOS #: 0.19 K/UL (ref 0–0.44)
ABSOLUTE IMMATURE GRANULOCYTE: 0.04 K/UL (ref 0–0.3)
ABSOLUTE IMMATURE GRANULOCYTE: <0.03 K/UL (ref 0–0.3)
ABSOLUTE LYMPH #: 1.49 K/UL (ref 1.1–3.7)
ABSOLUTE LYMPH #: 1.69 K/UL (ref 1.1–3.7)
ABSOLUTE MONO #: 0.51 K/UL (ref 0.1–1.2)
ABSOLUTE MONO #: 0.56 K/UL (ref 0.1–1.2)
ABSOLUTE RETIC #: 0.1 M/UL (ref 0.03–0.08)
ALBUMIN SERPL-MCNC: 4.5 G/DL (ref 3.5–5.2)
ALBUMIN SERPL-MCNC: 4.8 G/DL (ref 3.5–5.2)
ALBUMIN/GLOBULIN RATIO: 1.6 (ref 1–2.5)
ALBUMIN/GLOBULIN RATIO: 2.1 (ref 1–2.5)
ALP BLD-CCNC: 88 U/L (ref 35–104)
ALP BLD-CCNC: 92 U/L (ref 35–104)
ALT SERPL-CCNC: 31 U/L (ref 5–33)
ALT SERPL-CCNC: 32 U/L (ref 5–33)
ANION GAP SERPL CALCULATED.3IONS-SCNC: 10 MMOL/L (ref 9–17)
ANION GAP SERPL CALCULATED.3IONS-SCNC: 16 MMOL/L (ref 9–17)
AST SERPL-CCNC: 27 U/L
AST SERPL-CCNC: 30 U/L
BASOPHILS # BLD: 1 % (ref 0–2)
BASOPHILS # BLD: 1 % (ref 0–2)
BASOPHILS ABSOLUTE: 0.05 K/UL (ref 0–0.2)
BASOPHILS ABSOLUTE: 0.08 K/UL (ref 0–0.2)
BILIRUB SERPL-MCNC: 0.26 MG/DL (ref 0.3–1.2)
BILIRUB SERPL-MCNC: 0.37 MG/DL (ref 0.3–1.2)
BONE MARROW REPORT: NORMAL
BUN BLDV-MCNC: 11 MG/DL (ref 8–23)
BUN BLDV-MCNC: 15 MG/DL (ref 8–23)
BUN/CREAT BLD: ABNORMAL (ref 9–20)
BUN/CREAT BLD: ABNORMAL (ref 9–20)
CALCIUM SERPL-MCNC: 9.5 MG/DL (ref 8.6–10.4)
CALCIUM SERPL-MCNC: 9.6 MG/DL (ref 8.6–10.4)
CHLORIDE BLD-SCNC: 102 MMOL/L (ref 98–107)
CHLORIDE BLD-SCNC: 103 MMOL/L (ref 98–107)
CHOLESTEROL/HDL RATIO: 3.1
CHOLESTEROL: 230 MG/DL
CHROMOSOME STUDY: NORMAL
CO2: 24 MMOL/L (ref 20–31)
CO2: 31 MMOL/L (ref 20–31)
CONTROL: PRESENT
CREAT SERPL-MCNC: 0.55 MG/DL (ref 0.5–0.9)
CREAT SERPL-MCNC: 0.63 MG/DL (ref 0.5–0.9)
D-DIMER QUANTITATIVE: 0.43 MG/L FEU
DIFFERENTIAL TYPE: ABNORMAL
DIFFERENTIAL TYPE: ABNORMAL
EOSINOPHILS RELATIVE PERCENT: 3 % (ref 1–4)
EOSINOPHILS RELATIVE PERCENT: 3 % (ref 1–4)
FLOW CYTOMETRY, BM: NORMAL
GFR AFRICAN AMERICAN: >60 ML/MIN
GFR AFRICAN AMERICAN: >60 ML/MIN
GFR NON-AFRICAN AMERICAN: >60 ML/MIN
GFR NON-AFRICAN AMERICAN: >60 ML/MIN
GFR SERPL CREATININE-BSD FRML MDRD: ABNORMAL ML/MIN/{1.73_M2}
GLUCOSE BLD-MCNC: 118 MG/DL (ref 70–99)
GLUCOSE BLD-MCNC: 122 MG/DL (ref 70–99)
HCT VFR BLD CALC: 49.2 % (ref 36.3–47.1)
HCT VFR BLD CALC: 49.8 % (ref 36.3–47.1)
HDLC SERPL-MCNC: 75 MG/DL
HEMOCCULT STL QL: NEGATIVE
HEMOGLOBIN: 15.2 G/DL (ref 11.9–15.1)
HEMOGLOBIN: 15.8 G/DL (ref 11.9–15.1)
HEPATITIS B CORE TOTAL ANTIBODY: REACTIVE
HEPATITIS B SURFACE ANTIGEN: NONREACTIVE
HEPATITIS BE ANTIBODY: NORMAL
HEPATITIS BE ANTIGEN: NEGATIVE
HEPATITIS C ANTIBODY: NONREACTIVE
IMMATURE GRANULOCYTES: 0 %
IMMATURE GRANULOCYTES: 1 %
IMMATURE RETIC FRACT: 5.9 % (ref 2.7–18.3)
INR BLD: 1
INR BLD: 1
LDL CHOLESTEROL: 139 MG/DL (ref 0–130)
LYMPHOCYTES # BLD: 23 % (ref 24–43)
LYMPHOCYTES # BLD: 27 % (ref 24–43)
MCH RBC QN AUTO: 25.5 PG (ref 25.2–33.5)
MCH RBC QN AUTO: 27.1 PG (ref 25.2–33.5)
MCHC RBC AUTO-ENTMCNC: 30.5 G/DL (ref 28.4–34.8)
MCHC RBC AUTO-ENTMCNC: 32.1 G/DL (ref 28.4–34.8)
MCV RBC AUTO: 83.4 FL (ref 82.6–102.9)
MCV RBC AUTO: 84.5 FL (ref 82.6–102.9)
MONOCYTES # BLD: 8 % (ref 3–12)
MONOCYTES # BLD: 9 % (ref 3–12)
NRBC AUTOMATED: 0 PER 100 WBC
NRBC AUTOMATED: 0 PER 100 WBC
PARTIAL THROMBOPLASTIN TIME: 25.1 SEC (ref 23–31)
PARTIAL THROMBOPLASTIN TIME: 28.9 SEC (ref 24–36)
PDW BLD-RTO: 15.2 % (ref 11.8–14.4)
PDW BLD-RTO: 15.3 % (ref 11.8–14.4)
PLATELET # BLD: 158 K/UL (ref 138–453)
PLATELET # BLD: 174 K/UL (ref 150–450)
PLATELET # BLD: 185 K/UL (ref 138–453)
PLATELET ESTIMATE: ABNORMAL
PLATELET ESTIMATE: ABNORMAL
PMV BLD AUTO: 11 FL (ref 8.1–13.5)
PMV BLD AUTO: 11.1 FL (ref 8.1–13.5)
POTASSIUM SERPL-SCNC: 3.7 MMOL/L (ref 3.7–5.3)
POTASSIUM SERPL-SCNC: 3.9 MMOL/L (ref 3.7–5.3)
PROTHROMBIN TIME: 10.5 SEC (ref 9.7–11.6)
PROTHROMBIN TIME: 13.1 SEC (ref 11.8–14.6)
RBC # BLD: 5.82 M/UL (ref 3.95–5.11)
RBC # BLD: 5.97 M/UL (ref 3.95–5.11)
RBC # BLD: ABNORMAL 10*6/UL
RBC # BLD: ABNORMAL 10*6/UL
RETIC %: 1.6 % (ref 0.5–1.9)
RETIC HEMOGLOBIN: 31.7 PG (ref 28.2–35.7)
SEG NEUTROPHILS: 60 % (ref 36–65)
SEG NEUTROPHILS: 64 % (ref 36–65)
SEGMENTED NEUTROPHILS ABSOLUTE COUNT: 3.33 K/UL (ref 1.5–8.1)
SEGMENTED NEUTROPHILS ABSOLUTE COUNT: 4.91 K/UL (ref 1.5–8.1)
SODIUM BLD-SCNC: 143 MMOL/L (ref 135–144)
SODIUM BLD-SCNC: 143 MMOL/L (ref 135–144)
SURGICAL PATHOLOGY REPORT: NORMAL
TOTAL PROTEIN: 7.1 G/DL (ref 6.4–8.3)
TOTAL PROTEIN: 7.3 G/DL (ref 6.4–8.3)
TRIGL SERPL-MCNC: 80 MG/DL
TSH SERPL DL<=0.05 MIU/L-ACNC: 0.78 MIU/L (ref 0.3–5)
VLDLC SERPL CALC-MCNC: ABNORMAL MG/DL (ref 1–30)
WBC # BLD: 5.6 K/UL (ref 3.5–11.3)
WBC # BLD: 7.5 K/UL (ref 3.5–11.3)
WBC # BLD: ABNORMAL 10*3/UL
WBC # BLD: ABNORMAL 10*3/UL

## 2019-01-01 PROCEDURE — G8417 CALC BMI ABV UP PARAM F/U: HCPCS | Performed by: INTERNAL MEDICINE

## 2019-01-01 PROCEDURE — 36415 COLL VENOUS BLD VENIPUNCTURE: CPT

## 2019-01-01 PROCEDURE — 86704 HEP B CORE ANTIBODY TOTAL: CPT

## 2019-01-01 PROCEDURE — 1036F TOBACCO NON-USER: CPT | Performed by: FAMILY MEDICINE

## 2019-01-01 PROCEDURE — G8427 DOCREV CUR MEDS BY ELIG CLIN: HCPCS | Performed by: FAMILY MEDICINE

## 2019-01-01 PROCEDURE — 85379 FIBRIN DEGRADATION QUANT: CPT

## 2019-01-01 PROCEDURE — A9552 F18 FDG: HCPCS | Performed by: FAMILY MEDICINE

## 2019-01-01 PROCEDURE — 80053 COMPREHEN METABOLIC PANEL: CPT

## 2019-01-01 PROCEDURE — G8399 PT W/DXA RESULTS DOCUMENT: HCPCS | Performed by: FAMILY MEDICINE

## 2019-01-01 PROCEDURE — 99212 OFFICE O/P EST SF 10 MIN: CPT | Performed by: RADIOLOGY

## 2019-01-01 PROCEDURE — 88185 FLOWCYTOMETRY/TC ADD-ON: CPT

## 2019-01-01 PROCEDURE — 85049 AUTOMATED PLATELET COUNT: CPT

## 2019-01-01 PROCEDURE — 1090F PRES/ABSN URINE INCON ASSESS: CPT | Performed by: FAMILY MEDICINE

## 2019-01-01 PROCEDURE — G8482 FLU IMMUNIZE ORDER/ADMIN: HCPCS | Performed by: INTERNAL MEDICINE

## 2019-01-01 PROCEDURE — 99213 OFFICE O/P EST LOW 20 MIN: CPT | Performed by: RADIOLOGY

## 2019-01-01 PROCEDURE — 88313 SPECIAL STAINS GROUP 2: CPT

## 2019-01-01 PROCEDURE — 88305 TISSUE EXAM BY PATHOLOGIST: CPT

## 2019-01-01 PROCEDURE — 99211 OFF/OP EST MAY X REQ PHY/QHP: CPT | Performed by: INTERNAL MEDICINE

## 2019-01-01 PROCEDURE — 3017F COLORECTAL CA SCREEN DOC REV: CPT | Performed by: FAMILY MEDICINE

## 2019-01-01 PROCEDURE — 87350 HEPATITIS BE AG IA: CPT

## 2019-01-01 PROCEDURE — 2580000003 HC RX 258: Performed by: RADIOLOGY

## 2019-01-01 PROCEDURE — 4040F PNEUMOC VAC/ADMIN/RCVD: CPT | Performed by: FAMILY MEDICINE

## 2019-01-01 PROCEDURE — 4040F PNEUMOC VAC/ADMIN/RCVD: CPT | Performed by: INTERNAL MEDICINE

## 2019-01-01 PROCEDURE — 85025 COMPLETE CBC W/AUTO DIFF WBC: CPT

## 2019-01-01 PROCEDURE — G8926 SPIRO NO PERF OR DOC: HCPCS | Performed by: FAMILY MEDICINE

## 2019-01-01 PROCEDURE — 99201 HC NEW PT, E/M LEVEL 1: CPT | Performed by: INTERNAL MEDICINE

## 2019-01-01 PROCEDURE — G0438 PPPS, INITIAL VISIT: HCPCS | Performed by: FAMILY MEDICINE

## 2019-01-01 PROCEDURE — 99214 OFFICE O/P EST MOD 30 MIN: CPT | Performed by: FAMILY MEDICINE

## 2019-01-01 PROCEDURE — 82274 ASSAY TEST FOR BLOOD FECAL: CPT | Performed by: FAMILY MEDICINE

## 2019-01-01 PROCEDURE — 71045 X-RAY EXAM CHEST 1 VIEW: CPT

## 2019-01-01 PROCEDURE — 3017F COLORECTAL CA SCREEN DOC REV: CPT | Performed by: INTERNAL MEDICINE

## 2019-01-01 PROCEDURE — 77012 CT SCAN FOR NEEDLE BIOPSY: CPT

## 2019-01-01 PROCEDURE — 7100000031 HC ASPR PHASE II RECOVERY - ADDTL 15 MIN

## 2019-01-01 PROCEDURE — 88341 IMHCHEM/IMCYTCHM EA ADD ANTB: CPT

## 2019-01-01 PROCEDURE — 1123F ACP DISCUSS/DSCN MKR DOCD: CPT | Performed by: FAMILY MEDICINE

## 2019-01-01 PROCEDURE — 85730 THROMBOPLASTIN TIME PARTIAL: CPT

## 2019-01-01 PROCEDURE — 1090F PRES/ABSN URINE INCON ASSESS: CPT | Performed by: INTERNAL MEDICINE

## 2019-01-01 PROCEDURE — 87340 HEPATITIS B SURFACE AG IA: CPT

## 2019-01-01 PROCEDURE — 2709999900 CT NEEDLE BIOPSY LUNG PERCUTANEOUS

## 2019-01-01 PROCEDURE — 1036F TOBACCO NON-USER: CPT | Performed by: INTERNAL MEDICINE

## 2019-01-01 PROCEDURE — 85610 PROTHROMBIN TIME: CPT

## 2019-01-01 PROCEDURE — 88342 IMHCHEM/IMCYTCHM 1ST ANTB: CPT

## 2019-01-01 PROCEDURE — 84443 ASSAY THYROID STIM HORMONE: CPT

## 2019-01-01 PROCEDURE — 88184 FLOWCYTOMETRY/ TC 1 MARKER: CPT

## 2019-01-01 PROCEDURE — G0008 ADMIN INFLUENZA VIRUS VAC: HCPCS | Performed by: FAMILY MEDICINE

## 2019-01-01 PROCEDURE — 99204 OFFICE O/P NEW MOD 45 MIN: CPT | Performed by: INTERNAL MEDICINE

## 2019-01-01 PROCEDURE — 88264 CHROMOSOME ANALYSIS 20-25: CPT

## 2019-01-01 PROCEDURE — 3430000000 HC RX DIAGNOSTIC RADIOPHARMACEUTICAL: Performed by: FAMILY MEDICINE

## 2019-01-01 PROCEDURE — 88237 TISSUE CULTURE BONE MARROW: CPT

## 2019-01-01 PROCEDURE — 80061 LIPID PANEL: CPT

## 2019-01-01 PROCEDURE — 7100000011 HC PHASE II RECOVERY - ADDTL 15 MIN

## 2019-01-01 PROCEDURE — 6370000000 HC RX 637 (ALT 250 FOR IP): Performed by: RADIOLOGY

## 2019-01-01 PROCEDURE — 71046 X-RAY EXAM CHEST 2 VIEWS: CPT

## 2019-01-01 PROCEDURE — G8399 PT W/DXA RESULTS DOCUMENT: HCPCS | Performed by: INTERNAL MEDICINE

## 2019-01-01 PROCEDURE — 7100000010 HC PHASE II RECOVERY - FIRST 15 MIN

## 2019-01-01 PROCEDURE — G8427 DOCREV CUR MEDS BY ELIG CLIN: HCPCS | Performed by: INTERNAL MEDICINE

## 2019-01-01 PROCEDURE — 85045 AUTOMATED RETICULOCYTE COUNT: CPT

## 2019-01-01 PROCEDURE — 7100000030 HC ASPR PHASE II RECOVERY - FIRST 15 MIN

## 2019-01-01 PROCEDURE — G8482 FLU IMMUNIZE ORDER/ADMIN: HCPCS | Performed by: FAMILY MEDICINE

## 2019-01-01 PROCEDURE — 99215 OFFICE O/P EST HI 40 MIN: CPT | Performed by: INTERNAL MEDICINE

## 2019-01-01 PROCEDURE — G8417 CALC BMI ABV UP PARAM F/U: HCPCS | Performed by: FAMILY MEDICINE

## 2019-01-01 PROCEDURE — 99214 OFFICE O/P EST MOD 30 MIN: CPT | Performed by: INTERNAL MEDICINE

## 2019-01-01 PROCEDURE — 78815 PET IMAGE W/CT SKULL-THIGH: CPT

## 2019-01-01 PROCEDURE — 88333 PATH CONSLTJ SURG CYTO XM 1: CPT

## 2019-01-01 PROCEDURE — 1123F ACP DISCUSS/DSCN MKR DOCD: CPT | Performed by: INTERNAL MEDICINE

## 2019-01-01 PROCEDURE — 86803 HEPATITIS C AB TEST: CPT

## 2019-01-01 PROCEDURE — 3023F SPIROM DOC REV: CPT | Performed by: FAMILY MEDICINE

## 2019-01-01 PROCEDURE — 2500000003 HC RX 250 WO HCPCS: Performed by: RADIOLOGY

## 2019-01-01 PROCEDURE — 6360000002 HC RX W HCPCS: Performed by: RADIOLOGY

## 2019-01-01 PROCEDURE — 83036 HEMOGLOBIN GLYCOSYLATED A1C: CPT | Performed by: FAMILY MEDICINE

## 2019-01-01 PROCEDURE — 86707 HEPATITIS BE ANTIBODY: CPT

## 2019-01-01 PROCEDURE — 2709999900 CT BIOPSY BONE MARROW

## 2019-01-01 PROCEDURE — 4004F PT TOBACCO SCREEN RCVD TLK: CPT | Performed by: FAMILY MEDICINE

## 2019-01-01 PROCEDURE — 88311 DECALCIFY TISSUE: CPT

## 2019-01-01 PROCEDURE — 90653 IIV ADJUVANT VACCINE IM: CPT | Performed by: FAMILY MEDICINE

## 2019-01-01 PROCEDURE — 88280 CHROMOSOME KARYOTYPE STUDY: CPT

## 2019-01-01 PROCEDURE — 99204 OFFICE O/P NEW MOD 45 MIN: CPT | Performed by: FAMILY MEDICINE

## 2019-01-01 RX ORDER — CARVEDILOL 25 MG/1
TABLET ORAL
Qty: 60 TABLET | Refills: 3 | Status: SHIPPED | OUTPATIENT
Start: 2019-01-01 | End: 2020-01-01 | Stop reason: SDUPTHER

## 2019-01-01 RX ORDER — LIDOCAINE HYDROCHLORIDE 10 MG/ML
INJECTION, SOLUTION INFILTRATION; PERINEURAL
Status: COMPLETED | OUTPATIENT
Start: 2019-01-01 | End: 2019-01-01

## 2019-01-01 RX ORDER — ACETAMINOPHEN 325 MG/1
650 TABLET ORAL EVERY 4 HOURS PRN
Status: DISCONTINUED | OUTPATIENT
Start: 2019-01-01 | End: 2019-01-01 | Stop reason: HOSPADM

## 2019-01-01 RX ORDER — AMLODIPINE BESYLATE 10 MG/1
10 TABLET ORAL DAILY
Status: COMPLETED | OUTPATIENT
Start: 2019-01-01 | End: 2019-01-01

## 2019-01-01 RX ORDER — SODIUM CHLORIDE 9 MG/ML
INJECTION, SOLUTION INTRAVENOUS CONTINUOUS
Status: CANCELLED | OUTPATIENT
Start: 2019-01-01

## 2019-01-01 RX ORDER — HYDROCODONE BITARTRATE AND ACETAMINOPHEN 5; 325 MG/1; MG/1
1 TABLET ORAL EVERY 8 HOURS PRN
Qty: 60 TABLET | Refills: 0 | Status: SHIPPED | OUTPATIENT
Start: 2019-01-01 | End: 2020-01-01 | Stop reason: ALTCHOICE

## 2019-01-01 RX ORDER — HYDROCODONE BITARTRATE AND ACETAMINOPHEN 5; 325 MG/1; MG/1
1 TABLET ORAL EVERY 8 HOURS PRN
Qty: 60 TABLET | Refills: 0 | Status: SHIPPED | OUTPATIENT
Start: 2019-01-01 | End: 2019-01-01 | Stop reason: SDUPTHER

## 2019-01-01 RX ORDER — HYDROCODONE BITARTRATE AND ACETAMINOPHEN 5; 325 MG/1; MG/1
1 TABLET ORAL EVERY 8 HOURS PRN
Qty: 40 TABLET | Refills: 0 | Status: SHIPPED | OUTPATIENT
Start: 2019-01-01 | End: 2019-01-01 | Stop reason: SDUPTHER

## 2019-01-01 RX ORDER — TRAZODONE HYDROCHLORIDE 50 MG/1
TABLET ORAL
Qty: 30 TABLET | Refills: 5 | Status: SHIPPED | OUTPATIENT
Start: 2019-01-01

## 2019-01-01 RX ORDER — SODIUM CHLORIDE 9 MG/ML
INJECTION, SOLUTION INTRAVENOUS CONTINUOUS
Status: DISCONTINUED | OUTPATIENT
Start: 2019-01-01 | End: 2019-01-01 | Stop reason: HOSPADM

## 2019-01-01 RX ORDER — HYDROCODONE BITARTRATE AND ACETAMINOPHEN 5; 325 MG/1; MG/1
1 TABLET ORAL
Status: COMPLETED | OUTPATIENT
Start: 2019-01-01 | End: 2019-01-01

## 2019-01-01 RX ORDER — BUDESONIDE AND FORMOTEROL FUMARATE DIHYDRATE 160; 4.5 UG/1; UG/1
AEROSOL RESPIRATORY (INHALATION)
Qty: 10.2 G | Refills: 2 | Status: SHIPPED | OUTPATIENT
Start: 2019-01-01

## 2019-01-01 RX ORDER — CARVEDILOL 25 MG/1
25 TABLET ORAL 2 TIMES DAILY WITH MEALS
Status: COMPLETED | OUTPATIENT
Start: 2019-01-01 | End: 2019-01-01

## 2019-01-01 RX ORDER — FLUDEOXYGLUCOSE F 18 200 MCI/ML
13.08 INJECTION, SOLUTION INTRAVENOUS
Status: COMPLETED | OUTPATIENT
Start: 2019-01-01 | End: 2019-01-01

## 2019-01-01 RX ORDER — MIDAZOLAM HYDROCHLORIDE 1 MG/ML
INJECTION INTRAMUSCULAR; INTRAVENOUS
Status: COMPLETED | OUTPATIENT
Start: 2019-01-01 | End: 2019-01-01

## 2019-01-01 RX ORDER — SERTRALINE HYDROCHLORIDE 100 MG/1
150 TABLET, FILM COATED ORAL DAILY
Qty: 30 TABLET | Refills: 5
Start: 2019-01-01

## 2019-01-01 RX ORDER — CLOPIDOGREL BISULFATE 75 MG/1
75 TABLET ORAL DAILY
COMMUNITY
End: 2020-01-01 | Stop reason: SDUPTHER

## 2019-01-01 RX ADMIN — AMLODIPINE BESYLATE 10 MG: 10 TABLET ORAL at 14:02

## 2019-01-01 RX ADMIN — SODIUM CHLORIDE: 9 INJECTION, SOLUTION INTRAVENOUS at 11:33

## 2019-01-01 RX ADMIN — SODIUM CHLORIDE: 9 INJECTION, SOLUTION INTRAVENOUS at 10:23

## 2019-01-01 RX ADMIN — LIDOCAINE HYDROCHLORIDE 2 ML: 10 INJECTION, SOLUTION INFILTRATION; PERINEURAL at 12:02

## 2019-01-01 RX ADMIN — CARVEDILOL 25 MG: 25 TABLET, FILM COATED ORAL at 14:06

## 2019-01-01 RX ADMIN — FLUDEOXYGLUCOSE F 18 13.08 MILLICURIE: 200 INJECTION, SOLUTION INTRAVENOUS at 12:39

## 2019-01-01 RX ADMIN — HYDROCODONE BITARTRATE AND ACETAMINOPHEN 1 TABLET: 5; 325 TABLET ORAL at 13:06

## 2019-01-01 RX ADMIN — MIDAZOLAM 1 MG: 1 INJECTION INTRAMUSCULAR; INTRAVENOUS at 11:22

## 2019-01-01 ASSESSMENT — ENCOUNTER SYMPTOMS
WHEEZING: 0
NAUSEA: 0
CHEST TIGHTNESS: 0
SORE THROAT: 0
VOMITING: 0
COUGH: 0
SHORTNESS OF BREATH: 0
ABDOMINAL PAIN: 0
NAUSEA: 0
SHORTNESS OF BREATH: 1
VOMITING: 0

## 2019-01-01 ASSESSMENT — PAIN SCALES - GENERAL
PAINLEVEL_OUTOF10: 0
PAINLEVEL_OUTOF10: 8
PAINLEVEL_OUTOF10: 0

## 2019-01-01 ASSESSMENT — PATIENT HEALTH QUESTIONNAIRE - PHQ9
SUM OF ALL RESPONSES TO PHQ QUESTIONS 1-9: 2
1. LITTLE INTEREST OR PLEASURE IN DOING THINGS: 1
SUM OF ALL RESPONSES TO PHQ QUESTIONS 1-9: 2
2. FEELING DOWN, DEPRESSED OR HOPELESS: 1
SUM OF ALL RESPONSES TO PHQ9 QUESTIONS 1 & 2: 2

## 2019-01-01 ASSESSMENT — PAIN - FUNCTIONAL ASSESSMENT
PAIN_FUNCTIONAL_ASSESSMENT: 0-10
PAIN_FUNCTIONAL_ASSESSMENT: 0-10

## 2019-01-04 DIAGNOSIS — I10 ESSENTIAL HYPERTENSION: ICD-10-CM

## 2019-01-04 RX ORDER — AMLODIPINE BESYLATE 10 MG/1
TABLET ORAL
Qty: 90 TABLET | Refills: 1 | Status: SHIPPED | OUTPATIENT
Start: 2019-01-04 | End: 2020-01-01 | Stop reason: SDUPTHER

## 2019-01-11 DIAGNOSIS — I10 ESSENTIAL HYPERTENSION: ICD-10-CM

## 2019-01-14 RX ORDER — AMLODIPINE BESYLATE 10 MG/1
TABLET ORAL
Qty: 90 TABLET | Refills: 1 | Status: SHIPPED | OUTPATIENT
Start: 2019-01-14 | End: 2019-02-19 | Stop reason: SDUPTHER

## 2019-02-19 ENCOUNTER — OFFICE VISIT (OUTPATIENT)
Dept: PRIMARY CARE CLINIC | Age: 64
End: 2019-02-19
Payer: MEDICARE

## 2019-02-19 VITALS
OXYGEN SATURATION: 97 % | DIASTOLIC BLOOD PRESSURE: 82 MMHG | WEIGHT: 198.2 LBS | BODY MASS INDEX: 32.98 KG/M2 | HEART RATE: 63 BPM | SYSTOLIC BLOOD PRESSURE: 136 MMHG | RESPIRATION RATE: 18 BRPM

## 2019-02-19 DIAGNOSIS — G89.29 CHRONIC BILATERAL LOW BACK PAIN, WITH SCIATICA PRESENCE UNSPECIFIED: ICD-10-CM

## 2019-02-19 DIAGNOSIS — Z12.31 SCREENING MAMMOGRAM, ENCOUNTER FOR: ICD-10-CM

## 2019-02-19 DIAGNOSIS — S14.103D: Primary | ICD-10-CM

## 2019-02-19 DIAGNOSIS — M54.5 CHRONIC BILATERAL LOW BACK PAIN, WITH SCIATICA PRESENCE UNSPECIFIED: ICD-10-CM

## 2019-02-19 DIAGNOSIS — R59.9 LYMPH NODE ENLARGEMENT: ICD-10-CM

## 2019-02-19 DIAGNOSIS — Z78.0 POSTMENOPAUSAL: ICD-10-CM

## 2019-02-19 PROCEDURE — G8399 PT W/DXA RESULTS DOCUMENT: HCPCS | Performed by: INTERNAL MEDICINE

## 2019-02-19 PROCEDURE — 4004F PT TOBACCO SCREEN RCVD TLK: CPT | Performed by: INTERNAL MEDICINE

## 2019-02-19 PROCEDURE — 3017F COLORECTAL CA SCREEN DOC REV: CPT | Performed by: INTERNAL MEDICINE

## 2019-02-19 PROCEDURE — 1123F ACP DISCUSS/DSCN MKR DOCD: CPT | Performed by: INTERNAL MEDICINE

## 2019-02-19 PROCEDURE — 1090F PRES/ABSN URINE INCON ASSESS: CPT | Performed by: INTERNAL MEDICINE

## 2019-02-19 PROCEDURE — 4040F PNEUMOC VAC/ADMIN/RCVD: CPT | Performed by: INTERNAL MEDICINE

## 2019-02-19 PROCEDURE — G8417 CALC BMI ABV UP PARAM F/U: HCPCS | Performed by: INTERNAL MEDICINE

## 2019-02-19 PROCEDURE — G8484 FLU IMMUNIZE NO ADMIN: HCPCS | Performed by: INTERNAL MEDICINE

## 2019-02-19 PROCEDURE — G8427 DOCREV CUR MEDS BY ELIG CLIN: HCPCS | Performed by: INTERNAL MEDICINE

## 2019-02-19 PROCEDURE — 99214 OFFICE O/P EST MOD 30 MIN: CPT | Performed by: INTERNAL MEDICINE

## 2019-02-19 PROCEDURE — 1101F PT FALLS ASSESS-DOCD LE1/YR: CPT | Performed by: INTERNAL MEDICINE

## 2019-02-19 RX ORDER — LANOLIN ALCOHOL/MO/W.PET/CERES
1000 CREAM (GRAM) TOPICAL DAILY
COMMUNITY
End: 2019-01-01

## 2019-02-19 RX ORDER — TRAMADOL HYDROCHLORIDE 50 MG/1
50 TABLET ORAL EVERY 8 HOURS PRN
Qty: 30 TABLET | Refills: 0 | Status: SHIPPED | OUTPATIENT
Start: 2019-02-19 | End: 2019-03-21

## 2019-02-19 RX ORDER — ATORVASTATIN CALCIUM 10 MG/1
10 TABLET, FILM COATED ORAL DAILY
COMMUNITY
End: 2020-01-01

## 2019-02-19 ASSESSMENT — ENCOUNTER SYMPTOMS
VOMITING: 0
SORE THROAT: 0
TROUBLE SWALLOWING: 0
SHORTNESS OF BREATH: 0
EYE REDNESS: 0
COUGH: 0
NAUSEA: 0
EYE DISCHARGE: 0
BACK PAIN: 1
ABDOMINAL PAIN: 0

## 2019-02-26 ENCOUNTER — HOSPITAL ENCOUNTER (OUTPATIENT)
Dept: MAMMOGRAPHY | Age: 64
Discharge: HOME | End: 2019-02-28
Payer: MEDICARE

## 2019-02-26 ENCOUNTER — HOSPITAL ENCOUNTER (OUTPATIENT)
Age: 64
Discharge: HOME | End: 2019-02-28
Payer: MEDICARE

## 2019-02-26 ENCOUNTER — HOSPITAL ENCOUNTER (OUTPATIENT)
Dept: ULTRASOUND IMAGING | Age: 64
Discharge: HOME | End: 2019-02-28
Payer: MEDICARE

## 2019-02-26 ENCOUNTER — HOSPITAL ENCOUNTER (OUTPATIENT)
Dept: GENERAL RADIOLOGY | Age: 64
Discharge: HOME | End: 2019-02-28
Payer: MEDICARE

## 2019-02-26 DIAGNOSIS — G89.29 CHRONIC BILATERAL LOW BACK PAIN, WITH SCIATICA PRESENCE UNSPECIFIED: ICD-10-CM

## 2019-02-26 DIAGNOSIS — Z12.31 SCREENING MAMMOGRAM, ENCOUNTER FOR: ICD-10-CM

## 2019-02-26 DIAGNOSIS — R59.9 LYMPH NODE ENLARGEMENT: ICD-10-CM

## 2019-02-26 DIAGNOSIS — Z78.0 POSTMENOPAUSAL: ICD-10-CM

## 2019-02-26 DIAGNOSIS — M54.5 CHRONIC BILATERAL LOW BACK PAIN, WITH SCIATICA PRESENCE UNSPECIFIED: ICD-10-CM

## 2019-02-26 PROCEDURE — 76536 US EXAM OF HEAD AND NECK: CPT

## 2019-02-26 PROCEDURE — 72100 X-RAY EXAM L-S SPINE 2/3 VWS: CPT

## 2019-02-26 PROCEDURE — 77063 BREAST TOMOSYNTHESIS BI: CPT

## 2019-02-28 DIAGNOSIS — K11.6 CYST OF PAROTID GLAND: Primary | ICD-10-CM

## 2019-03-05 ENCOUNTER — TELEPHONE (OUTPATIENT)
Dept: PRIMARY CARE CLINIC | Age: 64
End: 2019-03-05

## 2019-03-05 DIAGNOSIS — K11.6 CYST OF PAROTID GLAND: Primary | ICD-10-CM

## 2019-03-12 DIAGNOSIS — F32.4 MAJOR DEPRESSIVE DISORDER WITH SINGLE EPISODE, IN PARTIAL REMISSION (HCC): ICD-10-CM

## 2019-03-12 RX ORDER — FLUOXETINE HYDROCHLORIDE 40 MG/1
CAPSULE ORAL
Qty: 90 CAPSULE | Refills: 1 | Status: SHIPPED | OUTPATIENT
Start: 2019-03-12 | End: 2019-01-01 | Stop reason: ALTCHOICE

## 2019-03-26 RX ORDER — BUDESONIDE AND FORMOTEROL FUMARATE DIHYDRATE 160; 4.5 UG/1; UG/1
AEROSOL RESPIRATORY (INHALATION)
Qty: 10.2 G | Refills: 2 | Status: SHIPPED | OUTPATIENT
Start: 2019-03-26 | End: 2019-01-01 | Stop reason: SDUPTHER

## 2019-03-27 ENCOUNTER — TELEPHONE (OUTPATIENT)
Dept: PRIMARY CARE CLINIC | Age: 64
End: 2019-03-27

## 2019-06-12 RX ORDER — CARVEDILOL 25 MG/1
TABLET ORAL
Qty: 60 TABLET | Refills: 3 | Status: SHIPPED | OUTPATIENT
Start: 2019-06-12 | End: 2019-01-01 | Stop reason: SDUPTHER

## 2019-06-12 NOTE — TELEPHONE ENCOUNTER
Last OV 02/19/2019    Health Maintenance   Topic Date Due    DTaP/Tdap/Td vaccine (1 - Tdap) 01/25/1972    Diabetes screen  01/25/1993    Shingles Vaccine (1 of 2) 01/25/2003    Colon cancer screen colonoscopy  01/25/2003    Flu vaccine (Season Ended) 09/01/2019    Potassium monitoring  02/19/2020    Creatinine monitoring  02/19/2020    Breast cancer screen  02/26/2021    Lipid screen  05/16/2023    DEXA (modify frequency per FRAX score)  Completed    Pneumococcal 65+ years Vaccine  Completed    Hepatitis C screen  Completed             (applicable per patient's age: Cancer Screenings, Depression Screening, Fall Risk Screening, Immunizations)    LDL Calculated (mg/dL)   Date Value   05/16/2018 136     BUN (mg/dL)   Date Value   06/08/2018 12      (goal A1C is < 7)   (goal LDL is <100) need 30-50% reduction from baseline     BP Readings from Last 3 Encounters:   02/19/19 136/82   12/03/18 (!) 140/82   06/20/18 138/80    (goal /80)      All Future Testing planned in [facility]:  Lab Frequency Next Occurrence   CT SOFT TISSUE NECK W CONTRAST Once 02/28/2020       Next Visit Date:  No future appointments.          Patient Active Problem List:     Essential hypertension     Chronic systolic congestive heart failure (HCC)     Arthritis     Simple chronic bronchitis (HCC)     Closed fracture of right distal fibula     Injury at C3 level of cervical spinal cord ([facility] [address]     Depression

## 2019-07-17 NOTE — PROGRESS NOTES
 Breast cancer screen  02/26/2021    Lipid screen  05/16/2023    DEXA (modify frequency per FRAX score)  Completed    Pneumococcal 65+ years Vaccine  Completed    Hepatitis C screen  Completed       Subjective:      Review of Systems   Constitutional: Negative for chills and fever. HENT: Negative for congestion and sore throat. Swelling anterior to R ear   Respiratory: Positive for shortness of breath. Negative for cough, chest tightness and wheezing. Cardiovascular: Negative for chest pain and leg swelling. Gastrointestinal: Negative for abdominal pain, nausea and vomiting. Musculoskeletal:        No leg swelling or calf pain   Skin:        Lump anterior to R ear noted   Hematological: Does not bruise/bleed easily. Objective:     Physical Exam   Constitutional: She is oriented to person, place, and time. She appears well-developed and well-nourished. No distress. HENT:   Head: Normocephalic and atraumatic. Mouth/Throat: Oropharynx is clear and moist. No oropharyngeal exudate. Enlarged movable cyst in preauricular area, no erythema noted   Eyes: Pupils are equal, round, and reactive to light. Conjunctivae are normal.   Neck: Neck supple. Cardiovascular: Normal rate, regular rhythm and normal heart sounds. Exam reveals no friction rub. No murmur heard. Pulmonary/Chest: Effort normal. No stridor. No respiratory distress. She has no wheezes. Minimal crackles left lung base noted   Musculoskeletal: She exhibits no edema. No le edema or calf pain/ttp   Neurological: She is alert and oriented to person, place, and time. Skin: Skin is warm and dry. She is not diaphoretic. Psychiatric: She has a normal mood and affect. Her behavior is normal.     /76 (Site: Left Upper Arm, Position: Sitting, Cuff Size: Large Adult)   Pulse 63   Resp 18   Ht 5' 5\" (1.651 m)   Wt 198 lb 3.1 oz (89.9 kg)   SpO2 93%   Breastfeeding? No   BMI 32.98 kg/m²     Assessment:      1. Dyspnea, unspecified type  - pt with worsening SOB past few weeks, minimal crackles noted left lung base, pt with no cough/chest pain/fevers or chills. Will check XRAY and ddimer. Discussed if worsening to go to ED.  - XR CHEST STANDARD (2 VW); Future  - D-Dimer, Quantitative; Future    2. Chronic obstructive pulmonary disease, unspecified COPD type ([address]  - continue symbicort and albuterol as needed. 3. Cyst of parotid gland  - pt with cyst again in same area as before and was seen by ENT for this before, I recommend she follow up with Dr. Conner Chopra regarding this. 4. Colon cancer screening  - POCT Fecal Immunochemical Test (FIT); Future  - POCT Fecal Immunochemical Test (FIT)    5. Essential hypertension  - BP well controlled, continue current medications. Plan:      Return in 1 month (on 8/17/2019) for Medicare Annual Wellness Visit (AWV). Orders Placed This Encounter   Procedures    XR CHEST STANDARD (2 VW)     Standing Status:   Future     Standing Expiration Date:   7/17/2020     Order Specific Question:   Reason for exam:     Answer:   dyspnea    D-Dimer, Quantitative     Standing Status:   Future     Standing Expiration Date:   7/17/2020    POCT Fecal Immunochemical Test (FIT)     Standing Status:   Future     Number of Occurrences:   1     Standing Expiration Date:   7/17/2020     No orders of the defined types were placed in this encounter.             Electronically signed by Ana Lilia Cole DO on 7/21/2019 at 9:31 AM

## 2019-09-04 NOTE — PROGRESS NOTES
below. These results, as well as other patient data from the [address] form, are documented in Flowsheets linked to this Encounter. No Known Allergies  Prior to Visit Medications    Medication Sig Taking? Authorizing Provider   sertraline (ZOLOFT) 100 MG tablet Take 1.5 tablets by mouth daily Yes Zhane Wright DO   clopidogrel (PLAVIX) 75 MG tablet Take 75 mg by mouth daily Yes Historical Provider, MD   carvedilol (COREG) 25 MG tablet TAKE ONE TABLET BY MOUTH TWICE A DAY WITH MEALS Yes Lehman Osler, MD   SYMBICORT 160-4.5 MCG/ACT AERO INHALE 1 PUFF BY MOUTH AS DIRECTED TWO TIMES A DAY Yes Diane Jannell Cabot, APRN - CNP   FLUoxetine (PROZAC) 40 MG capsule TAKE ONE CAPSULE BY MOUTH EVERY DAY Yes Luigi Pandey APRN - CNP   atorvastatin (LIPITOR) 10 MG tablet Take 10 mg by mouth daily Yes Historical Provider, MD   aspirin 81 MG tablet Take 81 mg by mouth daily Yes Historical Provider, MD   vitamin B-12 (CYANOCOBALAMIN) 1000 MCG tablet Take 1,000 mcg by mouth daily Yes Historical Provider, MD   amLODIPine (NORVASC) 10 MG tablet TAKE ONE TABLET BY MOUTH EVERY DAY Yes Lehman Osler, MD   traZODone (DESYREL) 50 MG tablet TAKE ONE TABLET BY MOUTH DAILY AT BEDTIME Yes Lehman Osler, MD   tiotropium (SPIRIVA) 18 MCG inhalation capsule Inhale 1 capsule into the lungs daily Yes Lehman Osler, MD   buPROPion (WELLBUTRIN SR) 150 MG extended release tablet Take 1 tablet by mouth daily Yes Lehman Osler, MD     Past Medical History:   Diagnosis Date    COPD (chronic obstructive pulmonary disease) ([facility] [address]     Depression     Hyperlipidemia     Hypertension      Past Surgical History:   Procedure Laterality Date    ANKLE SURGERY      broken ankle    APPENDECTOMY      HYSTERECTOMY, TOTAL ABDOMINAL  1982    SHOULDER SURGERY      TONSILLECTOMY       History reviewed. No pertinent family history.     CareTeam (Including outside providers/suppliers regularly involved in providing care):

## 2019-12-20 NOTE — PROGRESS NOTES
Montell Fabry                                                                                                                  12/20/2019  MRN:   [id number]  YOB: 1955  PCP:                           [place] SquibbDO  Referring Physician: No ref. provider found  Treating Physician Name: Lizzy Vaz MD      Reason for visit:  Chief Complaint   Patient presents with    Follow-up     review status of disease    Results     Patient is here to go over her biopsy     Current problems/ Active and recent treatments:  Low-grade follicular lymphoma, QL73 positive, early stage II  COPD  Lipomatous hypertrophy of inter-atrial septum    Interval history:     Patient presents to the clinic for a follow-up visit and to discuss results of her lung biopsy which came back consistent with lung cancer. Patient is struggling with pain. Denies nausea vomiting fever chills. Denies weight loss. Complains of being anxious. During this visit patient's allergy, social, medical, surgical history and medications were reviewed and updated. Summary of Case/History:    Montell Fabry a 75 y. o.female is a presents to the clinic to establish care and for further evaluation treatment recommendation. Patient initially noted a lump below her right ear back in April 2019. As the lump grew patient seek medical attention and was referred to ENT for further evaluation. Patient underwent FNA on 10/30/2019 which came back consistent with mature B-cell neoplasm expressing CD10, consistent with low-grade follicular lymphoma. Patient feels that the lump has become much smaller but is now starting to become painful. Patient does complain of fluctuation in her weight. She initially lost about 10 pounds but was able to regain it. Patient does complain of night sweats. Denies any excessive fatigue fevers. Denies any other swollen glands. Her blood work-up done back in September did not show any cytopenias.   Patient denies any difficulty swallowing. Denies any hearing loss. She is up-to-date on mammograms. Past Medical History:   Past Medical History:   Diagnosis Date    CAD (coronary artery disease)     fatty pockets in the atrium    Cancer ([facility]     follicular lymphoma    Chronic back pain     \"pinched columns\", smokes marijuana prn for back pain    COPD (chronic obstructive pulmonary disease) ([facility]     Depression     Hx of blood clots     Hyperlipidemia     Hypertension     Pelvic adhesions     very bad, had hyst with BSO    Pelvic pain in female     Hx, had hyst       Past Surgical History:     Past Surgical History:   Procedure Laterality Date    ANKLE SURGERY Right     broken ankle with hardware inserted and later removed    APPENDECTOMY      BONE MARROW BIOPSY Left 2019    pelvic, benign    CARDIAC SURGERY      cardiac cath, no stents    FINE NEEDLE ASPIRATION Right 2019    lymph nodes, cancer found, follicular    HYSTERECTOMY, TOTAL ABDOMINAL  1982    with BSO    OTHER SURGICAL HISTORY      blood clot extraction from under bladder, angiogram to remove bloot clot and inserted \"something to keep the vein open\" ?stent?  SHOULDER SURGERY Right     rotator cuff    TONSILLECTOMY         Patient Family Social History:     Social History     Socioeconomic History    Marital status:       Spouse name: None    Number of children: None    Years of education: None    Highest education level: None   Occupational History    None   Social Needs    Financial resource strain: None    Food insecurity:     Worry: None     Inability: None    Transportation needs:     Medical: None     Non-medical: None   Tobacco Use    Smoking status: Former Smoker     Packs/day: 0.25     Years: 55.00     Pack years: 13.75     Types: Cigarettes     Start date: 1963     Last attempt to quit: 2019     Years since quittin.9    Smokeless tobacco: Never Used   Substance and Sexual Activity murmurs  Abdomen - soft, nontender, nondistended, no masses or organomegaly  Neurological - alert, oriented, normal speech, no focal findings or movement disorder noted  Extremities - peripheral pulses normal, no pedal edema, no clubbing or cyanosis  Skin - normal coloration and turgor, no rashes, no suspicious skin lesions noted       DATA:    Results for orders placed or performed during the hospital encounter of 12/17/19   APTT   Result Value Ref Range    PTT 28.9 24.0 - 36.0 sec   Platelet count   Result Value Ref Range    Platelets 158 320 - 928 k/uL   Protime-INR   Result Value Ref Range    Protime 13.1 11.8 - 14.6 sec    INR 1.0    Surgical Pathology   Result Value Ref Range    Surgical Pathology Report       [address]  [phone]  Fax: [phone]  SURGICAL PATHOLOGY REPORT    Patient Name: Kam Cancino  MR#: [id number]  Specimen #[id number]       Final Diagnosis      LEFT LUNG, CT-GUIDED BIOPSY:         POSITIVE FOR MALIGNANCY         POORLY DIFFERENTIATED ADENOCARCINOMA CONSISTENT WITH LUNG PRIMARY  ORIGIN      Eva Lopez. Laura Powers D.O.  **Electronically Signed Out**         lv/12/18/2019  Frozen Section Diagnosis  Rapid Cytologic Interpretation:  Satisfactory for evaluation. Positive for malignancy. Non-small cell carcinoma. Findings conveyed to Dr. France Beyer at 12:25. Eva Mcclendon D.O. Clinical Information  Diffuse follicle center lymphoma of lymph nodes of neck, lung mass,  parotid mass, back pain, unspecified back location, unspecified back  pain laterally, unspecified chronicity    Source:  A: Lt lung aspirate core biopsy, Touch Preps    Gross Description  R eceived fresh are multiple cylindrical portions of gray-tan soft  tissue. Touch preps are obtained from these cores. The specimen is  entirely submitted in a single cassette.     Microscopic Description  Touch preps demonstrate aggregates of markedly atypical cells with  enlarged irregularly-shaped hyperchromatic nuclei. Core biopsies  demonstrate fibrous connective tissue with an invasive malignant  neoplasm. The neoplasm is similar to that seen in the touch preps. Immunostains performed show the lesional cells to be positive for CK7,  TTF-1, and Pankeratin. Focal positive staining is noted with  Mucicarmine. Negative staining is noted with P40 and CK20 (all  controls adequate). CTA ABD/PEL LOWER EXTREM W IVCON4/9/2019  [facility]  Result Impression   IMPRESSION:  1.  MODERATE INFRARENAL ABDOMINAL AORTIC ATHEROSCLEROTIC DISEASE WITH   GRADUALLY PROGRESSIVE MILD LUMINAL NARROWING DISTALLY TO BIFURCATION.    MODERATE LEFT RENAL ARTERY OSTIAL STENOSIS. 2.  RIGHT LOWER EXTREMITY: BULKY CALCIFIED PLAQUES INVOLVING EXTERNAL   ILIAC ARTERY CAUSING MILD TO MODERATE LUMINAL NARROWING IN 1.3 CM THE   PROXIMAL SEGMENT.  NO OTHER INFLAMMATION.  NO SIGNIFICANT INFRAINGUINAL   LESION.  2 VESSELS RUNOFF.  ABSENT VERSUS DIMINUTIVE POSTERIOR TIBIAL   ARTERY.  PERONEAL ARTERY OF GOOD CALIBER AND CONTINUES AS POSTERIOR   TIBIAL ARTERY INTO FOOT. 3.  LEFT LOWER EXTREMITY: BULKY CALCIFIED PLAQUES INVOLVING THE EXTERNAL   ILIAC ARTERY CAUSING MODERATE TO HIGH-GRADE LUMINAL NARROWING IN THE 1.5   CM PROXIMAL SEGMENT.  NO OTHER INFLAMMATION.  NO SIGNIFICANT   INFRAINGUINAL LESION.  2 VESSELS RUNOFF.  ABSENT VERSUS DIMINUTIVE   POSTERIOR TIBIAL ARTERY.  PERONEAL ARTERY OF CALIBER AND CONTINUES AS   POSTERIOR TIBIAL ARTERY INTO FOOT. 4. STABLE INCIDENTAL FINDINGS INCLUDING EXTENSIVE LIPOMATOUS HYPERTROPHY   OF THE INTERATRIAL SEPTUM, CHOLELITHIASIS AND SIGMOID DIVERTICULOSIS.        Impression:  Low-grade follicular lymphoma, TM15 positive, early stage II  Poorly differentiated adenocarcinoma of left lung  Mediastinal lymphadenopathy  Peripheral vascular disease  History of cocaine abuse  COPD  Lipomatous hypertrophy of inter-atrial septum  History of tobacco dependence, quit in January 2019    Plan:  Reviewed results of lung biopsy which is consistent with lung cancer. Recommend EBUS and biopsy of mediastinal lymph node as it is going to affect treatment planning. Recommend treatment of adenocarcinoma of lung first since it is the more aggressive cancer of the tube. Continue to monitor follicular lymphoma which likely is early stage. Return to clinic to discuss results of biopsy report. I will also discuss patient's case with pulmonologist.  NCCN guidelines were reviewed and discussed with the patient. The diagnosis and care plan were discussed with the patient in detail. I discussed the natural history of the disease, prognosis, risks and goals of therapy and answered all the patients questions to the best of my ability. Patient expressed understanding and was in agreement. Nolberto Young      I spent more than 25 minutes examining, evaluating, reviewing data, counseling the patient and coordinating care. Greater than 50% of time was spent face-to-face with the patient this note is created with the assistance of a speech recognition program.  While intending to generate a document that actually reflects the content of the visit, the document can still have some errors including those of syntax and sound a like substitutions which may escape proof reading. It such instances, actual meaning can be extrapolated by contextual diversion.

## 2020-01-01 ENCOUNTER — HOSPITAL ENCOUNTER (OUTPATIENT)
Dept: RADIATION ONCOLOGY | Age: 65
Discharge: HOME | End: 2020-04-08
Attending: RADIOLOGY
Payer: MEDICARE

## 2020-01-01 ENCOUNTER — APPOINTMENT (OUTPATIENT)
Dept: RADIATION ONCOLOGY | Age: 65
End: 2020-01-01
Attending: RADIOLOGY
Payer: MEDICARE

## 2020-01-01 ENCOUNTER — TELEPHONE (OUTPATIENT)
Dept: ONCOLOGY | Age: 65
End: 2020-01-01

## 2020-01-01 ENCOUNTER — HOSPITAL ENCOUNTER (OUTPATIENT)
Dept: RADIATION ONCOLOGY | Age: 65
Discharge: HOME | End: 2020-03-13
Attending: RADIOLOGY
Payer: MEDICARE

## 2020-01-01 ENCOUNTER — HOSPITAL ENCOUNTER (OUTPATIENT)
Age: 65
Discharge: HOME | End: 2020-04-29
Payer: MEDICARE

## 2020-01-01 ENCOUNTER — TELEPHONE (OUTPATIENT)
Dept: UROLOGY | Age: 65
End: 2020-01-01

## 2020-01-01 ENCOUNTER — TELEPHONE (OUTPATIENT)
Dept: INFECTIOUS DISEASES | Age: 65
End: 2020-01-01

## 2020-01-01 ENCOUNTER — TELEPHONE (OUTPATIENT)
Dept: PRIMARY CARE CLINIC | Age: 65
End: 2020-01-01

## 2020-01-01 ENCOUNTER — HOSPITAL ENCOUNTER (OUTPATIENT)
Dept: CT IMAGING | Age: 65
Discharge: HOME | End: 2020-02-07
Payer: MEDICARE

## 2020-01-01 ENCOUNTER — HOSPITAL ENCOUNTER (OUTPATIENT)
Dept: INFUSION THERAPY | Age: 65
Discharge: HOME | End: 2020-03-04
Payer: MEDICARE

## 2020-01-01 ENCOUNTER — HOSPITAL ENCOUNTER (OUTPATIENT)
Dept: RADIATION ONCOLOGY | Age: 65
Discharge: HOME | End: 2020-04-22
Attending: RADIOLOGY
Payer: MEDICARE

## 2020-01-01 ENCOUNTER — APPOINTMENT (OUTPATIENT)
Dept: CT IMAGING | Age: 65
DRG: 314 | End: 2020-01-01
Payer: MEDICARE

## 2020-01-01 ENCOUNTER — HOSPITAL ENCOUNTER (OUTPATIENT)
Dept: RADIATION ONCOLOGY | Age: 65
Discharge: HOME | End: 2020-03-23
Attending: RADIOLOGY
Payer: MEDICARE

## 2020-01-01 ENCOUNTER — HOSPITAL ENCOUNTER (OUTPATIENT)
Dept: RADIATION ONCOLOGY | Age: 65
Discharge: HOME | End: 2020-03-20
Attending: RADIOLOGY
Payer: MEDICARE

## 2020-01-01 ENCOUNTER — HOSPITAL ENCOUNTER (OUTPATIENT)
Dept: INFUSION THERAPY | Age: 65
Discharge: HOME | End: 2020-04-08
Payer: MEDICARE

## 2020-01-01 ENCOUNTER — CARE COORDINATION (OUTPATIENT)
Dept: CASE MANAGEMENT | Age: 65
End: 2020-01-01

## 2020-01-01 ENCOUNTER — OFFICE VISIT (OUTPATIENT)
Dept: INFECTIOUS DISEASES | Age: 65
End: 2020-01-01
Payer: MEDICARE

## 2020-01-01 ENCOUNTER — TELEPHONE (OUTPATIENT)
Dept: INFUSION THERAPY | Age: 65
End: 2020-01-01

## 2020-01-01 ENCOUNTER — CARE COORDINATION (OUTPATIENT)
Dept: CARE COORDINATION | Age: 65
End: 2020-01-01

## 2020-01-01 ENCOUNTER — HOSPITAL ENCOUNTER (OUTPATIENT)
Age: 65
Setting detail: SPECIMEN
Discharge: HOME | End: 2020-04-13
Payer: MEDICARE

## 2020-01-01 ENCOUNTER — HOSPITAL ENCOUNTER (OUTPATIENT)
Dept: INFUSION THERAPY | Age: 65
Discharge: HOME | End: 2020-03-25
Payer: MEDICARE

## 2020-01-01 ENCOUNTER — APPOINTMENT (OUTPATIENT)
Dept: RADIATION ONCOLOGY | Age: 65
End: 2020-01-01
Payer: MEDICARE

## 2020-01-01 ENCOUNTER — APPOINTMENT (OUTPATIENT)
Dept: GENERAL RADIOLOGY | Age: 65
DRG: 314 | End: 2020-01-01
Payer: MEDICARE

## 2020-01-01 ENCOUNTER — HOSPITAL ENCOUNTER (OUTPATIENT)
Dept: RADIATION ONCOLOGY | Age: 65
Discharge: HOME | End: 2020-03-17
Attending: RADIOLOGY
Payer: MEDICARE

## 2020-01-01 ENCOUNTER — OFFICE VISIT (OUTPATIENT)
Dept: PULMONOLOGY | Age: 65
End: 2020-01-01
Payer: MEDICARE

## 2020-01-01 ENCOUNTER — HOSPITAL ENCOUNTER (OUTPATIENT)
Dept: RADIATION ONCOLOGY | Age: 65
Discharge: HOME | End: 2020-04-09
Attending: RADIOLOGY
Payer: MEDICARE

## 2020-01-01 ENCOUNTER — HOSPITAL ENCOUNTER (OUTPATIENT)
Dept: RADIATION ONCOLOGY | Age: 65
Discharge: HOME | End: 2020-03-03
Attending: RADIOLOGY
Payer: MEDICARE

## 2020-01-01 ENCOUNTER — HOSPITAL ENCOUNTER (OUTPATIENT)
Dept: PREADMISSION TESTING | Age: 65
Setting detail: SPECIMEN
Discharge: HOME | End: 2020-05-16
Payer: MEDICARE

## 2020-01-01 ENCOUNTER — HOSPITAL ENCOUNTER (OUTPATIENT)
Dept: RADIATION ONCOLOGY | Age: 65
Discharge: HOME | End: 2020-03-12
Attending: RADIOLOGY
Payer: MEDICARE

## 2020-01-01 ENCOUNTER — HOSPITAL ENCOUNTER (OUTPATIENT)
Age: 65
Discharge: HOME | End: 2020-01-22
Payer: MEDICARE

## 2020-01-01 ENCOUNTER — HOSPITAL ENCOUNTER (OUTPATIENT)
Dept: INFUSION THERAPY | Age: 65
Discharge: HOME | End: 2020-03-18
Payer: MEDICARE

## 2020-01-01 ENCOUNTER — HOSPITAL ENCOUNTER (OUTPATIENT)
Dept: RADIATION ONCOLOGY | Age: 65
Discharge: HOME | End: 2020-02-27
Attending: RADIOLOGY
Payer: MEDICARE

## 2020-01-01 ENCOUNTER — ANESTHESIA (OUTPATIENT)
Dept: OPERATING ROOM | Age: 65
End: 2020-01-01
Payer: MEDICARE

## 2020-01-01 ENCOUNTER — TELEPHONE (OUTPATIENT)
Dept: SPIRITUAL SERVICES | Age: 65
End: 2020-01-01

## 2020-01-01 ENCOUNTER — HOSPITAL ENCOUNTER (OUTPATIENT)
Dept: RADIATION ONCOLOGY | Age: 65
Discharge: HOME | End: 2020-03-18
Attending: RADIOLOGY
Payer: MEDICARE

## 2020-01-01 ENCOUNTER — HOSPITAL ENCOUNTER (OUTPATIENT)
Dept: RADIATION ONCOLOGY | Age: 65
Discharge: HOME | End: 2020-02-03
Attending: RADIOLOGY
Payer: MEDICARE

## 2020-01-01 ENCOUNTER — HOSPITAL ENCOUNTER (INPATIENT)
Dept: RADIATION ONCOLOGY | Age: 65
LOS: 4 days | Discharge: HOME | End: 2020-04-04
Attending: RADIOLOGY | Admitting: INTERNAL MEDICINE
Payer: MEDICARE

## 2020-01-01 ENCOUNTER — HOSPITAL ENCOUNTER (INPATIENT)
Age: 65
LOS: 4 days | Discharge: HOME HEALTH | DRG: 178 | End: 2020-04-18
Attending: INTERNAL MEDICINE | Admitting: INTERNAL MEDICINE
Payer: MEDICARE

## 2020-01-01 ENCOUNTER — HOSPITAL ENCOUNTER (OUTPATIENT)
Dept: RADIATION ONCOLOGY | Age: 65
Discharge: HOME | End: 2020-03-11
Attending: RADIOLOGY
Payer: MEDICARE

## 2020-01-01 ENCOUNTER — HOSPITAL ENCOUNTER (OUTPATIENT)
Age: 65
Setting detail: SPECIMEN
Discharge: HOME | End: 2020-04-23
Payer: MEDICARE

## 2020-01-01 ENCOUNTER — HOSPITAL ENCOUNTER (OUTPATIENT)
Dept: GENERAL RADIOLOGY | Age: 65
Discharge: HOME | End: 2020-01-22
Payer: MEDICARE

## 2020-01-01 ENCOUNTER — APPOINTMENT (OUTPATIENT)
Dept: GENERAL RADIOLOGY | Age: 65
End: 2020-01-01
Payer: MEDICARE

## 2020-01-01 ENCOUNTER — OFFICE VISIT (OUTPATIENT)
Dept: ONCOLOGY | Age: 65
End: 2020-01-01
Payer: MEDICARE

## 2020-01-01 ENCOUNTER — HOSPITAL ENCOUNTER (OUTPATIENT)
Age: 65
Setting detail: SPECIMEN
Discharge: HOME | End: 2020-04-06
Payer: MEDICARE

## 2020-01-01 ENCOUNTER — HOSPITAL ENCOUNTER (OUTPATIENT)
Dept: RADIATION ONCOLOGY | Age: 65
Discharge: HOME | End: 2020-04-24
Attending: RADIOLOGY
Payer: MEDICARE

## 2020-01-01 ENCOUNTER — HOSPITAL ENCOUNTER (OUTPATIENT)
Dept: RADIATION ONCOLOGY | Age: 65
Discharge: HOME | End: 2020-03-19
Attending: RADIOLOGY
Payer: MEDICARE

## 2020-01-01 ENCOUNTER — HOSPITAL ENCOUNTER (OUTPATIENT)
Dept: RADIATION ONCOLOGY | Age: 65
Discharge: HOME | End: 2020-03-24
Attending: RADIOLOGY
Payer: MEDICARE

## 2020-01-01 ENCOUNTER — HOSPITAL ENCOUNTER (OUTPATIENT)
Dept: RADIATION ONCOLOGY | Age: 65
Discharge: HOME | End: 2020-02-25
Attending: RADIOLOGY
Payer: MEDICARE

## 2020-01-01 ENCOUNTER — TELEPHONE (OUTPATIENT)
Dept: RADIATION ONCOLOGY | Age: 65
End: 2020-01-01

## 2020-01-01 ENCOUNTER — HOSPITAL ENCOUNTER (OUTPATIENT)
Age: 65
Setting detail: SPECIMEN
Discharge: HOME | End: 2020-04-08
Payer: MEDICARE

## 2020-01-01 ENCOUNTER — HOSPITAL ENCOUNTER (OUTPATIENT)
Dept: RADIATION ONCOLOGY | Age: 65
Discharge: HOME | End: 2020-03-16
Attending: RADIOLOGY
Payer: MEDICARE

## 2020-01-01 ENCOUNTER — HOSPITAL ENCOUNTER (OUTPATIENT)
Dept: RADIATION ONCOLOGY | Age: 65
Discharge: HOME | End: 2020-03-26
Attending: RADIOLOGY
Payer: MEDICARE

## 2020-01-01 ENCOUNTER — HOSPITAL ENCOUNTER (EMERGENCY)
Age: 65
Discharge: ANOTHER HOSPITAL | End: 2020-04-14
Attending: EMERGENCY MEDICINE
Payer: MEDICARE

## 2020-01-01 ENCOUNTER — HOSPITAL ENCOUNTER (OUTPATIENT)
Dept: RADIATION ONCOLOGY | Age: 65
End: 2020-01-01
Attending: RADIOLOGY
Payer: MEDICARE

## 2020-01-01 ENCOUNTER — HOSPITAL ENCOUNTER (OUTPATIENT)
Dept: RADIATION ONCOLOGY | Age: 65
Discharge: HOME | End: 2020-04-23
Attending: RADIOLOGY
Payer: MEDICARE

## 2020-01-01 ENCOUNTER — HOSPITAL ENCOUNTER (OUTPATIENT)
Dept: RADIATION ONCOLOGY | Age: 65
Discharge: HOME | End: 2020-03-02
Attending: RADIOLOGY
Payer: MEDICARE

## 2020-01-01 ENCOUNTER — TELEPHONE (OUTPATIENT)
Dept: PULMONOLOGY | Age: 65
End: 2020-01-01

## 2020-01-01 ENCOUNTER — HOSPITAL ENCOUNTER (OUTPATIENT)
Age: 65
Setting detail: SPECIMEN
Discharge: HOME | End: 2020-04-20
Payer: MEDICARE

## 2020-01-01 ENCOUNTER — HOSPITAL ENCOUNTER (OUTPATIENT)
Dept: RADIATION ONCOLOGY | Age: 65
Discharge: HOME | End: 2020-03-25
Attending: RADIOLOGY
Payer: MEDICARE

## 2020-01-01 ENCOUNTER — HOSPITAL ENCOUNTER (EMERGENCY)
Age: 65
Discharge: HOME | End: 2020-04-28
Attending: EMERGENCY MEDICINE
Payer: MEDICARE

## 2020-01-01 ENCOUNTER — HOSPITAL ENCOUNTER (OUTPATIENT)
Dept: RADIATION ONCOLOGY | Age: 65
Discharge: HOME | End: 2020-01-31
Payer: MEDICARE

## 2020-01-01 ENCOUNTER — OFFICE VISIT (OUTPATIENT)
Dept: UROLOGY | Age: 65
End: 2020-01-01
Payer: MEDICARE

## 2020-01-01 ENCOUNTER — OFFICE VISIT (OUTPATIENT)
Dept: PRIMARY CARE CLINIC | Age: 65
End: 2020-01-01
Payer: MEDICARE

## 2020-01-01 ENCOUNTER — TELEPHONE (OUTPATIENT)
Dept: FAMILY MEDICINE CLINIC | Age: 65
End: 2020-01-01

## 2020-01-01 ENCOUNTER — ANESTHESIA EVENT (OUTPATIENT)
Dept: OPERATING ROOM | Age: 65
End: 2020-01-01
Payer: MEDICARE

## 2020-01-01 ENCOUNTER — HOSPITAL ENCOUNTER (OUTPATIENT)
Dept: RADIATION ONCOLOGY | Age: 65
Discharge: HOME | End: 2020-03-05
Attending: RADIOLOGY
Payer: MEDICARE

## 2020-01-01 ENCOUNTER — HOSPITAL ENCOUNTER (OUTPATIENT)
Dept: RADIATION ONCOLOGY | Age: 65
Discharge: HOME | End: 2020-03-04
Attending: RADIOLOGY
Payer: MEDICARE

## 2020-01-01 ENCOUNTER — HOSPITAL ENCOUNTER (OUTPATIENT)
Dept: RADIATION ONCOLOGY | Age: 65
Discharge: HOME | End: 2020-04-14
Attending: RADIOLOGY
Payer: MEDICARE

## 2020-01-01 ENCOUNTER — HOSPITAL ENCOUNTER (OUTPATIENT)
Dept: RADIATION ONCOLOGY | Age: 65
Discharge: HOME | End: 2020-02-28
Attending: RADIOLOGY
Payer: MEDICARE

## 2020-01-01 ENCOUNTER — HOSPITAL ENCOUNTER (OUTPATIENT)
Dept: INFUSION THERAPY | Age: 65
Discharge: HOME | End: 2020-03-11
Payer: MEDICARE

## 2020-01-01 ENCOUNTER — HOSPITAL ENCOUNTER (OUTPATIENT)
Dept: PULMONOLOGY | Age: 65
Discharge: HOME | End: 2020-01-10
Payer: MEDICARE

## 2020-01-01 ENCOUNTER — HOSPITAL ENCOUNTER (OUTPATIENT)
Dept: RADIATION ONCOLOGY | Age: 65
Discharge: HOME | End: 2020-03-09
Attending: RADIOLOGY
Payer: MEDICARE

## 2020-01-01 ENCOUNTER — APPOINTMENT (OUTPATIENT)
Dept: ULTRASOUND IMAGING | Age: 65
DRG: 314 | End: 2020-01-01
Payer: MEDICARE

## 2020-01-01 ENCOUNTER — HOSPITAL ENCOUNTER (OUTPATIENT)
Dept: RADIATION ONCOLOGY | Age: 65
Discharge: HOME | End: 2020-02-21
Attending: RADIOLOGY
Payer: MEDICARE

## 2020-01-01 ENCOUNTER — HOSPITAL ENCOUNTER (OUTPATIENT)
Age: 65
Setting detail: OUTPATIENT SURGERY
Discharge: HOME | End: 2020-01-28
Attending: INTERNAL MEDICINE | Admitting: INTERNAL MEDICINE
Payer: MEDICARE

## 2020-01-01 ENCOUNTER — HOSPITAL ENCOUNTER (OUTPATIENT)
Age: 65
Setting detail: SPECIMEN
Discharge: HOME | End: 2020-04-30
Payer: MEDICARE

## 2020-01-01 ENCOUNTER — HOSPITAL ENCOUNTER (OUTPATIENT)
Dept: INTERVENTIONAL RADIOLOGY/VASCULAR | Age: 65
Discharge: HOME | End: 2020-02-26
Payer: MEDICARE

## 2020-01-01 ENCOUNTER — HOSPITAL ENCOUNTER (OUTPATIENT)
Dept: RADIATION ONCOLOGY | Age: 65
Discharge: HOME | End: 2020-03-10
Attending: RADIOLOGY
Payer: MEDICARE

## 2020-01-01 ENCOUNTER — HOSPITAL ENCOUNTER (INPATIENT)
Age: 65
LOS: 1 days | Discharge: ANOTHER HOSPITAL | DRG: 314 | End: 2020-03-31
Attending: EMERGENCY MEDICINE | Admitting: INTERNAL MEDICINE
Payer: MEDICARE

## 2020-01-01 ENCOUNTER — HOSPITAL ENCOUNTER (OUTPATIENT)
Dept: RADIATION ONCOLOGY | Age: 65
Discharge: HOME | End: 2020-03-06
Attending: RADIOLOGY
Payer: MEDICARE

## 2020-01-01 ENCOUNTER — HOSPITAL ENCOUNTER (OUTPATIENT)
Dept: RADIATION ONCOLOGY | Age: 65
Discharge: HOME | End: 2020-04-10
Attending: RADIOLOGY
Payer: MEDICARE

## 2020-01-01 ENCOUNTER — HOSPITAL ENCOUNTER (OUTPATIENT)
Dept: ONCOLOGY | Age: 65
Discharge: HOME | End: 2020-04-10
Attending: INTERNAL MEDICINE | Admitting: INTERNAL MEDICINE
Payer: MEDICARE

## 2020-01-01 ENCOUNTER — APPOINTMENT (OUTPATIENT)
Dept: GENERAL RADIOLOGY | Age: 65
DRG: 178 | End: 2020-01-01
Attending: INTERNAL MEDICINE
Payer: MEDICARE

## 2020-01-01 ENCOUNTER — HOSPITAL ENCOUNTER (OUTPATIENT)
Age: 65
Setting detail: SPECIMEN
Discharge: HOME | End: 2020-04-11
Payer: MEDICARE

## 2020-01-01 ENCOUNTER — TELEPHONE (OUTPATIENT)
Dept: CASE MANAGEMENT | Age: 65
End: 2020-01-01

## 2020-01-01 ENCOUNTER — HOSPITAL ENCOUNTER (OUTPATIENT)
Dept: RADIATION ONCOLOGY | Age: 65
Discharge: HOME | End: 2020-03-30
Attending: RADIOLOGY
Payer: MEDICARE

## 2020-01-01 ENCOUNTER — HOSPITAL ENCOUNTER (OUTPATIENT)
Age: 65
Setting detail: SPECIMEN
Discharge: HOME | End: 2020-04-27
Payer: MEDICARE

## 2020-01-01 ENCOUNTER — HOSPITAL ENCOUNTER (OUTPATIENT)
Dept: INFUSION THERAPY | Age: 65
Discharge: HOME | End: 2020-02-26
Payer: MEDICARE

## 2020-01-01 ENCOUNTER — HOSPITAL ENCOUNTER (OUTPATIENT)
Dept: RADIATION ONCOLOGY | Age: 65
Discharge: HOME | End: 2020-02-07
Attending: RADIOLOGY
Payer: MEDICARE

## 2020-01-01 ENCOUNTER — HOSPITAL ENCOUNTER (OUTPATIENT)
Dept: RADIATION ONCOLOGY | Age: 65
Discharge: HOME | End: 2020-04-27
Attending: RADIOLOGY
Payer: MEDICARE

## 2020-01-01 ENCOUNTER — APPOINTMENT (OUTPATIENT)
Dept: INTERVENTIONAL RADIOLOGY/VASCULAR | Age: 65
DRG: 314 | End: 2020-01-01
Payer: MEDICARE

## 2020-01-01 VITALS
HEART RATE: 90 BPM | DIASTOLIC BLOOD PRESSURE: 92 MMHG | BODY MASS INDEX: 25.61 KG/M2 | SYSTOLIC BLOOD PRESSURE: 178 MMHG | HEIGHT: 64 IN | WEIGHT: 150 LBS

## 2020-01-01 VITALS
DIASTOLIC BLOOD PRESSURE: 71 MMHG | HEART RATE: 95 BPM | SYSTOLIC BLOOD PRESSURE: 131 MMHG | TEMPERATURE: 98.2 F | BODY MASS INDEX: 32.11 KG/M2 | RESPIRATION RATE: 29 BRPM | HEIGHT: 64 IN | WEIGHT: 188.05 LBS | OXYGEN SATURATION: 98 %

## 2020-01-01 VITALS
SYSTOLIC BLOOD PRESSURE: 180 MMHG | DIASTOLIC BLOOD PRESSURE: 99 MMHG | HEART RATE: 54 BPM | TEMPERATURE: 98.5 F | RESPIRATION RATE: 16 BRPM | WEIGHT: 182 LBS | BODY MASS INDEX: 33.28 KG/M2 | SYSTOLIC BLOOD PRESSURE: 159 MMHG | HEART RATE: 81 BPM | DIASTOLIC BLOOD PRESSURE: 110 MMHG | WEIGHT: 193.9 LBS | OXYGEN SATURATION: 98 % | BODY MASS INDEX: 31.07 KG/M2 | HEIGHT: 64 IN | TEMPERATURE: 98.3 F

## 2020-01-01 VITALS
BODY MASS INDEX: 31.92 KG/M2 | HEIGHT: 64 IN | TEMPERATURE: 97.3 F | DIASTOLIC BLOOD PRESSURE: 89 MMHG | RESPIRATION RATE: 17 BRPM | SYSTOLIC BLOOD PRESSURE: 172 MMHG | HEART RATE: 56 BPM | WEIGHT: 187 LBS | OXYGEN SATURATION: 97 %

## 2020-01-01 VITALS
RESPIRATION RATE: 18 BRPM | BODY MASS INDEX: 30.9 KG/M2 | TEMPERATURE: 97.8 F | SYSTOLIC BLOOD PRESSURE: 171 MMHG | HEART RATE: 76 BPM | DIASTOLIC BLOOD PRESSURE: 93 MMHG | WEIGHT: 180 LBS

## 2020-01-01 VITALS
HEIGHT: 64 IN | BODY MASS INDEX: 31.76 KG/M2 | RESPIRATION RATE: 14 BRPM | DIASTOLIC BLOOD PRESSURE: 120 MMHG | WEIGHT: 186 LBS | BODY MASS INDEX: 31.82 KG/M2 | TEMPERATURE: 98.3 F | HEART RATE: 71 BPM | HEART RATE: 85 BPM | SYSTOLIC BLOOD PRESSURE: 153 MMHG | SYSTOLIC BLOOD PRESSURE: 210 MMHG | OXYGEN SATURATION: 95 % | DIASTOLIC BLOOD PRESSURE: 99 MMHG | WEIGHT: 185.4 LBS

## 2020-01-01 VITALS
RESPIRATION RATE: 14 BRPM | HEART RATE: 70 BPM | TEMPERATURE: 98 F | BODY MASS INDEX: 28.89 KG/M2 | OXYGEN SATURATION: 93 % | SYSTOLIC BLOOD PRESSURE: 145 MMHG | DIASTOLIC BLOOD PRESSURE: 78 MMHG | HEIGHT: 65 IN | WEIGHT: 173.4 LBS

## 2020-01-01 VITALS
OXYGEN SATURATION: 96 % | WEIGHT: 175 LBS | SYSTOLIC BLOOD PRESSURE: 189 MMHG | HEART RATE: 110 BPM | TEMPERATURE: 98.2 F | BODY MASS INDEX: 29.88 KG/M2 | DIASTOLIC BLOOD PRESSURE: 107 MMHG | HEIGHT: 64 IN | RESPIRATION RATE: 18 BRPM

## 2020-01-01 VITALS
RESPIRATION RATE: 16 BRPM | OXYGEN SATURATION: 95 % | BODY MASS INDEX: 32.1 KG/M2 | SYSTOLIC BLOOD PRESSURE: 175 MMHG | HEIGHT: 64 IN | DIASTOLIC BLOOD PRESSURE: 108 MMHG | WEIGHT: 188 LBS | HEART RATE: 61 BPM

## 2020-01-01 VITALS
HEART RATE: 92 BPM | WEIGHT: 180.8 LBS | RESPIRATION RATE: 18 BRPM | SYSTOLIC BLOOD PRESSURE: 156 MMHG | TEMPERATURE: 98.2 F | DIASTOLIC BLOOD PRESSURE: 96 MMHG | BODY MASS INDEX: 31.03 KG/M2

## 2020-01-01 VITALS
SYSTOLIC BLOOD PRESSURE: 157 MMHG | WEIGHT: 188.2 LBS | DIASTOLIC BLOOD PRESSURE: 87 MMHG | BODY MASS INDEX: 32.3 KG/M2 | TEMPERATURE: 98.1 F | HEART RATE: 80 BPM | RESPIRATION RATE: 18 BRPM

## 2020-01-01 VITALS
WEIGHT: 165.4 LBS | TEMPERATURE: 98.4 F | HEART RATE: 70 BPM | SYSTOLIC BLOOD PRESSURE: 142 MMHG | BODY MASS INDEX: 28.39 KG/M2 | DIASTOLIC BLOOD PRESSURE: 82 MMHG

## 2020-01-01 VITALS
TEMPERATURE: 98.1 F | HEART RATE: 80 BPM | BODY MASS INDEX: 32.3 KG/M2 | WEIGHT: 188.2 LBS | SYSTOLIC BLOOD PRESSURE: 157 MMHG | DIASTOLIC BLOOD PRESSURE: 87 MMHG

## 2020-01-01 VITALS
SYSTOLIC BLOOD PRESSURE: 198 MMHG | WEIGHT: 184 LBS | DIASTOLIC BLOOD PRESSURE: 112 MMHG | OXYGEN SATURATION: 98 % | HEART RATE: 67 BPM | HEIGHT: 64 IN | BODY MASS INDEX: 31.41 KG/M2 | RESPIRATION RATE: 18 BRPM

## 2020-01-01 VITALS
BODY MASS INDEX: 32.25 KG/M2 | HEART RATE: 73 BPM | SYSTOLIC BLOOD PRESSURE: 168 MMHG | WEIGHT: 187.9 LBS | TEMPERATURE: 98.5 F | RESPIRATION RATE: 20 BRPM | DIASTOLIC BLOOD PRESSURE: 84 MMHG

## 2020-01-01 VITALS
SYSTOLIC BLOOD PRESSURE: 169 MMHG | DIASTOLIC BLOOD PRESSURE: 89 MMHG | OXYGEN SATURATION: 93 % | WEIGHT: 162 LBS | TEMPERATURE: 98.6 F | HEART RATE: 91 BPM | RESPIRATION RATE: 18 BRPM | BODY MASS INDEX: 27.81 KG/M2

## 2020-01-01 VITALS
TEMPERATURE: 98.3 F | OXYGEN SATURATION: 97 % | SYSTOLIC BLOOD PRESSURE: 153 MMHG | BODY MASS INDEX: 31.82 KG/M2 | HEART RATE: 71 BPM | WEIGHT: 185.4 LBS | RESPIRATION RATE: 16 BRPM | DIASTOLIC BLOOD PRESSURE: 99 MMHG

## 2020-01-01 VITALS
HEIGHT: 64 IN | WEIGHT: 154.54 LBS | HEART RATE: 86 BPM | OXYGEN SATURATION: 96 % | RESPIRATION RATE: 28 BRPM | TEMPERATURE: 99.9 F | BODY MASS INDEX: 26.38 KG/M2 | SYSTOLIC BLOOD PRESSURE: 152 MMHG | DIASTOLIC BLOOD PRESSURE: 80 MMHG

## 2020-01-01 VITALS
TEMPERATURE: 98.2 F | RESPIRATION RATE: 18 BRPM | OXYGEN SATURATION: 93 % | HEART RATE: 94 BPM | SYSTOLIC BLOOD PRESSURE: 146 MMHG | DIASTOLIC BLOOD PRESSURE: 84 MMHG

## 2020-01-01 VITALS
SYSTOLIC BLOOD PRESSURE: 161 MMHG | TEMPERATURE: 97.8 F | HEART RATE: 88 BPM | RESPIRATION RATE: 20 BRPM | WEIGHT: 186 LBS | DIASTOLIC BLOOD PRESSURE: 90 MMHG | BODY MASS INDEX: 31.93 KG/M2 | OXYGEN SATURATION: 98 %

## 2020-01-01 VITALS
WEIGHT: 154 LBS | SYSTOLIC BLOOD PRESSURE: 135 MMHG | HEART RATE: 77 BPM | OXYGEN SATURATION: 97 % | DIASTOLIC BLOOD PRESSURE: 89 MMHG | HEIGHT: 64 IN | BODY MASS INDEX: 26.29 KG/M2

## 2020-01-01 VITALS
OXYGEN SATURATION: 100 % | WEIGHT: 180 LBS | RESPIRATION RATE: 12 BRPM | HEIGHT: 64 IN | DIASTOLIC BLOOD PRESSURE: 97 MMHG | SYSTOLIC BLOOD PRESSURE: 116 MMHG | TEMPERATURE: 96.8 F | HEART RATE: 65 BPM | BODY MASS INDEX: 30.73 KG/M2

## 2020-01-01 VITALS
DIASTOLIC BLOOD PRESSURE: 62 MMHG | TEMPERATURE: 97.6 F | HEART RATE: 102 BPM | OXYGEN SATURATION: 97 % | SYSTOLIC BLOOD PRESSURE: 90 MMHG | RESPIRATION RATE: 22 BRPM

## 2020-01-01 VITALS
WEIGHT: 180 LBS | BODY MASS INDEX: 30.9 KG/M2 | HEART RATE: 66 BPM | OXYGEN SATURATION: 98 % | SYSTOLIC BLOOD PRESSURE: 164 MMHG | TEMPERATURE: 98.9 F | RESPIRATION RATE: 24 BRPM | DIASTOLIC BLOOD PRESSURE: 95 MMHG

## 2020-01-01 VITALS
HEART RATE: 73 BPM | HEIGHT: 64 IN | BODY MASS INDEX: 30.35 KG/M2 | DIASTOLIC BLOOD PRESSURE: 98 MMHG | OXYGEN SATURATION: 93 % | SYSTOLIC BLOOD PRESSURE: 150 MMHG | RESPIRATION RATE: 12 BRPM | WEIGHT: 177.8 LBS

## 2020-01-01 VITALS
OXYGEN SATURATION: 92 % | TEMPERATURE: 98.8 F | DIASTOLIC BLOOD PRESSURE: 60 MMHG | SYSTOLIC BLOOD PRESSURE: 91 MMHG | RESPIRATION RATE: 22 BRPM | HEART RATE: 119 BPM

## 2020-01-01 VITALS — TEMPERATURE: 96.5 F | OXYGEN SATURATION: 100 % | DIASTOLIC BLOOD PRESSURE: 65 MMHG | SYSTOLIC BLOOD PRESSURE: 126 MMHG

## 2020-01-01 VITALS
SYSTOLIC BLOOD PRESSURE: 104 MMHG | HEIGHT: 64 IN | RESPIRATION RATE: 16 BRPM | BODY MASS INDEX: 31.05 KG/M2 | TEMPERATURE: 99.9 F | OXYGEN SATURATION: 95 % | WEIGHT: 181.9 LBS | HEART RATE: 73 BPM | DIASTOLIC BLOOD PRESSURE: 73 MMHG

## 2020-01-01 VITALS
WEIGHT: 188.3 LBS | SYSTOLIC BLOOD PRESSURE: 151 MMHG | DIASTOLIC BLOOD PRESSURE: 87 MMHG | OXYGEN SATURATION: 98 % | HEART RATE: 80 BPM | BODY MASS INDEX: 32.32 KG/M2 | TEMPERATURE: 98.1 F

## 2020-01-01 VITALS — DIASTOLIC BLOOD PRESSURE: 92 MMHG | SYSTOLIC BLOOD PRESSURE: 161 MMHG | HEART RATE: 94 BPM | TEMPERATURE: 97.5 F

## 2020-01-01 VITALS
RESPIRATION RATE: 16 BRPM | BODY MASS INDEX: 25.61 KG/M2 | HEIGHT: 64 IN | SYSTOLIC BLOOD PRESSURE: 164 MMHG | WEIGHT: 150 LBS | TEMPERATURE: 99 F | OXYGEN SATURATION: 97 % | DIASTOLIC BLOOD PRESSURE: 76 MMHG | HEART RATE: 83 BPM

## 2020-01-01 VITALS
RESPIRATION RATE: 18 BRPM | WEIGHT: 180.6 LBS | SYSTOLIC BLOOD PRESSURE: 132 MMHG | DIASTOLIC BLOOD PRESSURE: 88 MMHG | HEIGHT: 65 IN | HEART RATE: 72 BPM | OXYGEN SATURATION: 93 % | BODY MASS INDEX: 30.09 KG/M2

## 2020-01-01 VITALS
BODY MASS INDEX: 33.13 KG/M2 | TEMPERATURE: 98.5 F | DIASTOLIC BLOOD PRESSURE: 99 MMHG | RESPIRATION RATE: 16 BRPM | HEART RATE: 54 BPM | WEIGHT: 193 LBS | OXYGEN SATURATION: 97 % | SYSTOLIC BLOOD PRESSURE: 159 MMHG

## 2020-01-01 DIAGNOSIS — K11.8 PAROTID MASS: ICD-10-CM

## 2020-01-01 DIAGNOSIS — C34.91 PRIMARY ADENOCARCINOMA OF RIGHT LUNG (HCC): ICD-10-CM

## 2020-01-01 DIAGNOSIS — R61 NIGHT SWEATS: ICD-10-CM

## 2020-01-01 DIAGNOSIS — R91.8 LUNG MASS: ICD-10-CM

## 2020-01-01 DIAGNOSIS — M54.9 BACK PAIN, UNSPECIFIED BACK LOCATION, UNSPECIFIED BACK PAIN LATERALITY, UNSPECIFIED CHRONICITY: ICD-10-CM

## 2020-01-01 DIAGNOSIS — C82.51 DIFFUSE FOLLICLE CENTER LYMPHOMA OF LYMPH NODES OF NECK (HCC): ICD-10-CM

## 2020-01-01 DIAGNOSIS — T66.XXXA RADIATION ESOPHAGITIS: ICD-10-CM

## 2020-01-01 DIAGNOSIS — Z11.59 ENCOUNTER FOR SCREENING FOR OTHER VIRAL DISEASES: ICD-10-CM

## 2020-01-01 DIAGNOSIS — C34.92 ADENOCARCINOMA, LUNG, LEFT (HCC): Primary | ICD-10-CM

## 2020-01-01 DIAGNOSIS — C34.91 PRIMARY ADENOCARCINOMA OF RIGHT LUNG (HCC): Primary | ICD-10-CM

## 2020-01-01 DIAGNOSIS — C82.51 DIFFUSE FOLLICLE CENTER LYMPHOMA OF LYMPH NODES OF NECK (HCC): Primary | ICD-10-CM

## 2020-01-01 DIAGNOSIS — K20.80 RADIATION ESOPHAGITIS: ICD-10-CM

## 2020-01-01 LAB
-: ABNORMAL
ABSOLUTE BANDS #: 0.28 K/UL (ref 0–1)
ABSOLUTE EOS #: 0 K/UL (ref 0–0.4)
ABSOLUTE EOS #: 0.03 K/UL (ref 0–0.4)
ABSOLUTE EOS #: 0.05 K/UL (ref 0–0.4)
ABSOLUTE EOS #: 0.06 K/UL (ref 0–0.4)
ABSOLUTE EOS #: 0.06 K/UL (ref 0–0.4)
ABSOLUTE EOS #: 0.07 K/UL (ref 0–0.4)
ABSOLUTE EOS #: 0.1 K/UL (ref 0–0.4)
ABSOLUTE EOS #: 0.1 K/UL (ref 0–0.4)
ABSOLUTE EOS #: 0.14 K/UL (ref 0–0.4)
ABSOLUTE EOS #: 0.19 K/UL (ref 0–0.4)
ABSOLUTE EOS #: 0.2 K/UL (ref 0–0.44)
ABSOLUTE EOS #: 0.37 K/UL (ref 0–0.4)
ABSOLUTE IMMATURE GRANULOCYTE: 0 K/UL (ref 0–0.3)
ABSOLUTE IMMATURE GRANULOCYTE: 0 K/UL (ref 0–0.3)
ABSOLUTE IMMATURE GRANULOCYTE: 0.06 K/UL (ref 0–0.3)
ABSOLUTE IMMATURE GRANULOCYTE: 0.13 K/UL (ref 0–0.3)
ABSOLUTE IMMATURE GRANULOCYTE: 0.25 K/UL (ref 0–0.3)
ABSOLUTE IMMATURE GRANULOCYTE: 0.57 K/UL (ref 0–0.3)
ABSOLUTE IMMATURE GRANULOCYTE: ABNORMAL K/UL (ref 0–0.3)
ABSOLUTE LYMPH #: 0.03 K/UL (ref 1–4.8)
ABSOLUTE LYMPH #: 0.11 K/UL (ref 1–4.8)
ABSOLUTE LYMPH #: 0.12 K/UL (ref 1–4.8)
ABSOLUTE LYMPH #: 0.17 K/UL (ref 1–4.8)
ABSOLUTE LYMPH #: 0.18 K/UL (ref 1–4.8)
ABSOLUTE LYMPH #: 0.19 K/UL (ref 1–4.8)
ABSOLUTE LYMPH #: 0.23 K/UL (ref 1–4.8)
ABSOLUTE LYMPH #: 0.27 K/UL (ref 1–4.8)
ABSOLUTE LYMPH #: 0.28 K/UL (ref 1–4.8)
ABSOLUTE LYMPH #: 0.28 K/UL (ref 1–4.8)
ABSOLUTE LYMPH #: 0.31 K/UL (ref 1–4.8)
ABSOLUTE LYMPH #: 0.32 K/UL (ref 1–4.8)
ABSOLUTE LYMPH #: 0.34 K/UL (ref 1–4.8)
ABSOLUTE LYMPH #: 0.46 K/UL (ref 1–4.8)
ABSOLUTE LYMPH #: 0.59 K/UL (ref 1–4.8)
ABSOLUTE LYMPH #: 0.6 K/UL (ref 1–4.8)
ABSOLUTE LYMPH #: 0.62 K/UL (ref 1–4.8)
ABSOLUTE LYMPH #: 0.76 K/UL (ref 1.1–3.7)
ABSOLUTE LYMPH #: 0.82 K/UL (ref 1–4.8)
ABSOLUTE MONO #: 0.06 K/UL (ref 0.1–0.8)
ABSOLUTE MONO #: 0.07 K/UL (ref 0.1–0.8)
ABSOLUTE MONO #: 0.07 K/UL (ref 0.1–0.8)
ABSOLUTE MONO #: 0.1 K/UL (ref 0.1–1.2)
ABSOLUTE MONO #: 0.14 K/UL (ref 0.1–1.2)
ABSOLUTE MONO #: 0.18 K/UL (ref 0.1–0.8)
ABSOLUTE MONO #: 0.25 K/UL (ref 0.1–0.8)
ABSOLUTE MONO #: 0.29 K/UL (ref 0.1–0.8)
ABSOLUTE MONO #: 0.31 K/UL (ref 0.1–0.8)
ABSOLUTE MONO #: 0.34 K/UL (ref 0.1–0.8)
ABSOLUTE MONO #: 0.34 K/UL (ref 0.1–0.8)
ABSOLUTE MONO #: 0.37 K/UL (ref 0.1–1.3)
ABSOLUTE MONO #: 0.42 K/UL (ref 0.1–0.8)
ABSOLUTE MONO #: 0.49 K/UL (ref 0.1–1.3)
ABSOLUTE MONO #: 0.5 K/UL (ref 0.1–0.8)
ABSOLUTE MONO #: 0.5 K/UL (ref 0.1–1.3)
ABSOLUTE MONO #: 0.52 K/UL (ref 0.1–0.8)
ABSOLUTE MONO #: 0.56 K/UL (ref 0.1–0.8)
ABSOLUTE MONO #: 0.6 K/UL (ref 0.1–1.2)
ABSOLUTE MONO #: 0.6 K/UL (ref 0.1–1.2)
ABSOLUTE MONO #: 0.71 K/UL (ref 0.1–1.2)
ABSOLUTE RETIC #: 0.1 M/UL (ref 0.03–0.08)
ALBUMIN SERPL-MCNC: 2.5 G/DL (ref 3.5–5.2)
ALBUMIN SERPL-MCNC: 2.6 G/DL (ref 3.5–5.2)
ALBUMIN SERPL-MCNC: 2.7 G/DL (ref 3.5–5.2)
ALBUMIN SERPL-MCNC: 2.9 G/DL (ref 3.5–5.2)
ALBUMIN SERPL-MCNC: 2.9 G/DL (ref 3.5–5.2)
ALBUMIN SERPL-MCNC: 3 G/DL (ref 3.5–5.2)
ALBUMIN SERPL-MCNC: 3.2 G/DL (ref 3.5–5.2)
ALBUMIN SERPL-MCNC: 3.6 G/DL (ref 3.5–5.2)
ALBUMIN SERPL-MCNC: 4.2 G/DL (ref 3.5–5.2)
ALBUMIN SERPL-MCNC: 4.3 G/DL (ref 3.5–5.2)
ALBUMIN SERPL-MCNC: 4.3 G/DL (ref 3.5–5.2)
ALBUMIN SERPL-MCNC: 4.4 G/DL (ref 3.5–5.2)
ALBUMIN SERPL-MCNC: 4.6 G/DL (ref 3.5–5.2)
ALBUMIN/GLOBULIN RATIO: 0.6 (ref 1–2.5)
ALBUMIN/GLOBULIN RATIO: 0.6 (ref 1–2.5)
ALBUMIN/GLOBULIN RATIO: 0.7 (ref 1–2.5)
ALBUMIN/GLOBULIN RATIO: 0.7 (ref 1–2.5)
ALBUMIN/GLOBULIN RATIO: 0.8 (ref 1–2.5)
ALBUMIN/GLOBULIN RATIO: 0.8 (ref 1–2.5)
ALBUMIN/GLOBULIN RATIO: 0.9 (ref 1–2.5)
ALBUMIN/GLOBULIN RATIO: 1.1 (ref 1–2.5)
ALBUMIN/GLOBULIN RATIO: 1.2 (ref 1–2.5)
ALBUMIN/GLOBULIN RATIO: 1.8 (ref 1–2.5)
ALBUMIN/GLOBULIN RATIO: 2 (ref 1–2.5)
ALBUMIN/GLOBULIN RATIO: ABNORMAL (ref 1–2.5)
ALLEN TEST: ABNORMAL
ALP BLD-CCNC: 41 U/L (ref 35–104)
ALP BLD-CCNC: 52 U/L (ref 35–104)
ALP BLD-CCNC: 62 U/L (ref 35–104)
ALP BLD-CCNC: 67 U/L (ref 35–104)
ALP BLD-CCNC: 68 U/L (ref 35–104)
ALP BLD-CCNC: 70 U/L (ref 35–104)
ALP BLD-CCNC: 70 U/L (ref 35–104)
ALP BLD-CCNC: 71 U/L (ref 35–104)
ALP BLD-CCNC: 73 U/L (ref 35–104)
ALP BLD-CCNC: 74 U/L (ref 35–104)
ALP BLD-CCNC: 77 U/L (ref 35–104)
ALP BLD-CCNC: 78 U/L (ref 35–104)
ALP BLD-CCNC: 78 U/L (ref 35–104)
ALP BLD-CCNC: 86 U/L (ref 35–104)
ALP BLD-CCNC: 88 U/L (ref 35–104)
ALP BLD-CCNC: 89 U/L (ref 35–104)
ALP BLD-CCNC: 90 U/L (ref 35–104)
ALP BLD-CCNC: 93 U/L (ref 35–104)
ALT SERPL-CCNC: 10 U/L (ref 5–33)
ALT SERPL-CCNC: 11 U/L (ref 5–33)
ALT SERPL-CCNC: 14 U/L (ref 5–33)
ALT SERPL-CCNC: 15 U/L (ref 5–33)
ALT SERPL-CCNC: 16 U/L (ref 5–33)
ALT SERPL-CCNC: 17 U/L (ref 5–33)
ALT SERPL-CCNC: 19 U/L (ref 5–33)
ALT SERPL-CCNC: 21 U/L (ref 5–33)
ALT SERPL-CCNC: 22 U/L (ref 5–33)
ALT SERPL-CCNC: 23 U/L (ref 5–33)
ALT SERPL-CCNC: 36 U/L (ref 5–33)
ALT SERPL-CCNC: 6 U/L (ref 5–33)
ALT SERPL-CCNC: 7 U/L (ref 5–33)
ALT SERPL-CCNC: 8 U/L (ref 5–33)
ALT SERPL-CCNC: 9 U/L (ref 5–33)
ALT SERPL-CCNC: 9 U/L (ref 5–33)
AMMONIA: 43 UMOL/L (ref 11–51)
AMORPHOUS: ABNORMAL
AMYLASE: 18 U/L (ref 28–100)
ANION GAP SERPL CALCULATED.3IONS-SCNC: 10 MMOL/L (ref 9–17)
ANION GAP SERPL CALCULATED.3IONS-SCNC: 11 MMOL/L (ref 9–17)
ANION GAP SERPL CALCULATED.3IONS-SCNC: 12 MMOL/L (ref 9–17)
ANION GAP SERPL CALCULATED.3IONS-SCNC: 13 MMOL/L (ref 9–17)
ANION GAP SERPL CALCULATED.3IONS-SCNC: 14 MMOL/L (ref 9–17)
ANION GAP SERPL CALCULATED.3IONS-SCNC: 15 MMOL/L (ref 9–17)
ANION GAP SERPL CALCULATED.3IONS-SCNC: 16 MMOL/L (ref 9–17)
ANION GAP SERPL CALCULATED.3IONS-SCNC: 18 MMOL/L (ref 9–17)
ANION GAP SERPL CALCULATED.3IONS-SCNC: 19 MMOL/L (ref 9–17)
ANION GAP SERPL CALCULATED.3IONS-SCNC: 8 MMOL/L (ref 9–17)
AST SERPL-CCNC: 13 U/L
AST SERPL-CCNC: 13 U/L
AST SERPL-CCNC: 14 U/L
AST SERPL-CCNC: 15 U/L
AST SERPL-CCNC: 15 U/L
AST SERPL-CCNC: 16 U/L
AST SERPL-CCNC: 17 U/L
AST SERPL-CCNC: 19 U/L
AST SERPL-CCNC: 21 U/L
AST SERPL-CCNC: 21 U/L
AST SERPL-CCNC: 24 U/L
AST SERPL-CCNC: 41 U/L
AST SERPL-CCNC: 42 U/L
AST SERPL-CCNC: 65 U/L
BACTERIA: ABNORMAL
BANDS: 4 % (ref 0–10)
BASOPHILS # BLD: 0 % (ref 0–2)
BASOPHILS # BLD: 1 % (ref 0–2)
BASOPHILS # BLD: 2 % (ref 0–2)
BASOPHILS ABSOLUTE: 0 K/UL (ref 0–0.2)
BASOPHILS ABSOLUTE: 0.03 K/UL (ref 0–0.2)
BASOPHILS ABSOLUTE: 0.04 K/UL (ref 0–0.2)
BASOPHILS ABSOLUTE: 0.06 K/UL (ref 0–0.2)
BASOPHILS ABSOLUTE: 0.07 K/UL (ref 0–0.2)
BASOPHILS ABSOLUTE: 0.1 K/UL (ref 0–0.2)
BASOPHILS ABSOLUTE: 0.1 K/UL (ref 0–0.2)
BASOPHILS ABSOLUTE: 0.11 K/UL (ref 0–0.2)
BILIRUB SERPL-MCNC: 0.21 MG/DL (ref 0.3–1.2)
BILIRUB SERPL-MCNC: 0.23 MG/DL (ref 0.3–1.2)
BILIRUB SERPL-MCNC: 0.24 MG/DL (ref 0.3–1.2)
BILIRUB SERPL-MCNC: 0.29 MG/DL (ref 0.3–1.2)
BILIRUB SERPL-MCNC: 0.29 MG/DL (ref 0.3–1.2)
BILIRUB SERPL-MCNC: 0.3 MG/DL (ref 0.3–1.2)
BILIRUB SERPL-MCNC: 0.3 MG/DL (ref 0.3–1.2)
BILIRUB SERPL-MCNC: 0.35 MG/DL (ref 0.3–1.2)
BILIRUB SERPL-MCNC: 0.36 MG/DL (ref 0.3–1.2)
BILIRUB SERPL-MCNC: 0.38 MG/DL (ref 0.3–1.2)
BILIRUB SERPL-MCNC: 0.38 MG/DL (ref 0.3–1.2)
BILIRUB SERPL-MCNC: 0.4 MG/DL (ref 0.3–1.2)
BILIRUB SERPL-MCNC: 0.4 MG/DL (ref 0.3–1.2)
BILIRUB SERPL-MCNC: 0.48 MG/DL (ref 0.3–1.2)
BILIRUB SERPL-MCNC: 0.52 MG/DL (ref 0.3–1.2)
BILIRUB SERPL-MCNC: 0.58 MG/DL (ref 0.3–1.2)
BILIRUB SERPL-MCNC: 0.62 MG/DL (ref 0.3–1.2)
BILIRUB SERPL-MCNC: 0.7 MG/DL (ref 0.3–1.2)
BILIRUBIN DIRECT: 0.18 MG/DL
BILIRUBIN DIRECT: 0.33 MG/DL
BILIRUBIN URINE: NEGATIVE
BILIRUBIN, INDIRECT: 0.17 MG/DL (ref 0–1)
BILIRUBIN, INDIRECT: 0.19 MG/DL (ref 0–1)
BNP INTERPRETATION: ABNORMAL
BUN BLDV-MCNC: 11 MG/DL (ref 8–23)
BUN BLDV-MCNC: 12 MG/DL (ref 8–23)
BUN BLDV-MCNC: 13 MG/DL (ref 8–23)
BUN BLDV-MCNC: 14 MG/DL (ref 8–23)
BUN BLDV-MCNC: 14 MG/DL (ref 8–23)
BUN BLDV-MCNC: 15 MG/DL (ref 8–23)
BUN BLDV-MCNC: 15 MG/DL (ref 8–23)
BUN BLDV-MCNC: 16 MG/DL (ref 8–23)
BUN BLDV-MCNC: 17 MG/DL (ref 8–23)
BUN BLDV-MCNC: 17 MG/DL (ref 8–23)
BUN BLDV-MCNC: 18 MG/DL (ref 8–23)
BUN BLDV-MCNC: 21 MG/DL (ref 8–23)
BUN BLDV-MCNC: 24 MG/DL (ref 8–23)
BUN BLDV-MCNC: 24 MG/DL (ref 8–23)
BUN BLDV-MCNC: 29 MG/DL (ref 8–23)
BUN BLDV-MCNC: 34 MG/DL (ref 8–23)
BUN BLDV-MCNC: 9 MG/DL (ref 8–23)
BUN/CREAT BLD: ABNORMAL (ref 9–20)
C-REACTIVE PROTEIN: 194 MG/L (ref 0–5)
C-REACTIVE PROTEIN: 195.5 MG/L (ref 0–5)
C-REACTIVE PROTEIN: 46 MG/L (ref 0–5)
CALCIUM IONIZED: 1.19 MMOL/L (ref 1.13–1.33)
CALCIUM IONIZED: 1.19 MMOL/L (ref 1.13–1.33)
CALCIUM SERPL-MCNC: 8.2 MG/DL (ref 8.6–10.4)
CALCIUM SERPL-MCNC: 8.3 MG/DL (ref 8.6–10.4)
CALCIUM SERPL-MCNC: 8.4 MG/DL (ref 8.6–10.4)
CALCIUM SERPL-MCNC: 8.5 MG/DL (ref 8.6–10.4)
CALCIUM SERPL-MCNC: 8.6 MG/DL (ref 8.6–10.4)
CALCIUM SERPL-MCNC: 8.7 MG/DL (ref 8.6–10.4)
CALCIUM SERPL-MCNC: 8.8 MG/DL (ref 8.6–10.4)
CALCIUM SERPL-MCNC: 8.8 MG/DL (ref 8.6–10.4)
CALCIUM SERPL-MCNC: 8.9 MG/DL (ref 8.6–10.4)
CALCIUM SERPL-MCNC: 9 MG/DL (ref 8.6–10.4)
CALCIUM SERPL-MCNC: 9.1 MG/DL (ref 8.6–10.4)
CALCIUM SERPL-MCNC: 9.2 MG/DL (ref 8.6–10.4)
CALCIUM SERPL-MCNC: 9.6 MG/DL (ref 8.6–10.4)
CALCIUM SERPL-MCNC: 9.8 MG/DL (ref 8.6–10.4)
CASTS UA: ABNORMAL /LPF
CHLORIDE BLD-SCNC: 100 MMOL/L (ref 98–107)
CHLORIDE BLD-SCNC: 101 MMOL/L (ref 98–107)
CHLORIDE BLD-SCNC: 102 MMOL/L (ref 98–107)
CHLORIDE BLD-SCNC: 103 MMOL/L (ref 98–107)
CHLORIDE BLD-SCNC: 84 MMOL/L (ref 98–107)
CHLORIDE BLD-SCNC: 93 MMOL/L (ref 98–107)
CHLORIDE BLD-SCNC: 95 MMOL/L (ref 98–107)
CHLORIDE BLD-SCNC: 96 MMOL/L (ref 98–107)
CHLORIDE BLD-SCNC: 97 MMOL/L (ref 98–107)
CHLORIDE BLD-SCNC: 98 MMOL/L (ref 98–107)
CHLORIDE BLD-SCNC: 99 MMOL/L (ref 98–107)
CHLORIDE BLD-SCNC: 99 MMOL/L (ref 98–107)
CK MB: 1.9 NG/ML
CO2: 20 MMOL/L (ref 20–31)
CO2: 21 MMOL/L (ref 20–31)
CO2: 22 MMOL/L (ref 20–31)
CO2: 23 MMOL/L (ref 20–31)
CO2: 24 MMOL/L (ref 20–31)
CO2: 24 MMOL/L (ref 20–31)
CO2: 25 MMOL/L (ref 20–31)
CO2: 26 MMOL/L (ref 20–31)
CO2: 27 MMOL/L (ref 20–31)
CO2: 28 MMOL/L (ref 20–31)
CO2: 30 MMOL/L (ref 20–31)
CO2: 31 MMOL/L (ref 20–31)
COLOR: YELLOW
COMMENT UA: ABNORMAL
CREAT SERPL-MCNC: 0.46 MG/DL (ref 0.5–0.9)
CREAT SERPL-MCNC: 0.46 MG/DL (ref 0.5–0.9)
CREAT SERPL-MCNC: 0.51 MG/DL (ref 0.5–0.9)
CREAT SERPL-MCNC: 0.52 MG/DL (ref 0.5–0.9)
CREAT SERPL-MCNC: 0.54 MG/DL (ref 0.5–0.9)
CREAT SERPL-MCNC: 0.55 MG/DL (ref 0.5–0.9)
CREAT SERPL-MCNC: 0.64 MG/DL (ref 0.5–0.9)
CREAT SERPL-MCNC: 0.66 MG/DL (ref 0.5–0.9)
CREAT SERPL-MCNC: 0.66 MG/DL (ref 0.5–0.9)
CREAT SERPL-MCNC: 0.67 MG/DL (ref 0.5–0.9)
CREAT SERPL-MCNC: 0.71 MG/DL (ref 0.5–0.9)
CREAT SERPL-MCNC: 0.75 MG/DL (ref 0.5–0.9)
CREAT SERPL-MCNC: 1.12 MG/DL (ref 0.5–0.9)
CREAT SERPL-MCNC: 1.14 MG/DL (ref 0.5–0.9)
CREAT SERPL-MCNC: 1.14 MG/DL (ref 0.5–0.9)
CREAT SERPL-MCNC: 1.21 MG/DL (ref 0.5–0.9)
CREAT SERPL-MCNC: 1.22 MG/DL (ref 0.5–0.9)
CREAT SERPL-MCNC: 1.25 MG/DL (ref 0.5–0.9)
CREAT SERPL-MCNC: 1.25 MG/DL (ref 0.5–0.9)
CREAT SERPL-MCNC: 1.3 MG/DL (ref 0.5–0.9)
CREAT SERPL-MCNC: 1.38 MG/DL (ref 0.5–0.9)
CREAT SERPL-MCNC: 1.39 MG/DL (ref 0.5–0.9)
CREAT SERPL-MCNC: 1.39 MG/DL (ref 0.5–0.9)
CREAT SERPL-MCNC: 1.42 MG/DL (ref 0.5–0.9)
CREAT SERPL-MCNC: 1.42 MG/DL (ref 0.5–0.9)
CREAT SERPL-MCNC: 1.52 MG/DL (ref 0.5–0.9)
CREAT SERPL-MCNC: 1.63 MG/DL (ref 0.5–0.9)
CREAT SERPL-MCNC: 1.66 MG/DL (ref 0.5–0.9)
CREAT SERPL-MCNC: 1.66 MG/DL (ref 0.5–0.9)
CRYSTALS, UA: ABNORMAL /HPF
CULTURE: ABNORMAL
CULTURE: NORMAL
DIFFERENTIAL TYPE: ABNORMAL
DIRECT EXAM: ABNORMAL
DIRECT EXAM: NORMAL
DIRECT EXAM: NORMAL
EKG ATRIAL RATE: 108 BPM
EKG ATRIAL RATE: 58 BPM
EKG ATRIAL RATE: 82 BPM
EKG ATRIAL RATE: 92 BPM
EKG ATRIAL RATE: 95 BPM
EKG P AXIS: 11 DEGREES
EKG P AXIS: 19 DEGREES
EKG P AXIS: 72 DEGREES
EKG P-R INTERVAL: 180 MS
EKG P-R INTERVAL: 196 MS
EKG P-R INTERVAL: 198 MS
EKG P-R INTERVAL: 210 MS
EKG Q-T INTERVAL: 362 MS
EKG Q-T INTERVAL: 364 MS
EKG Q-T INTERVAL: 402 MS
EKG Q-T INTERVAL: 416 MS
EKG Q-T INTERVAL: 434 MS
EKG QRS DURATION: 100 MS
EKG QRS DURATION: 106 MS
EKG QRS DURATION: 114 MS
EKG QRS DURATION: 94 MS
EKG QRS DURATION: 96 MS
EKG QTC CALCULATION (BAZETT): 426 MS
EKG QTC CALCULATION (BAZETT): 457 MS
EKG QTC CALCULATION (BAZETT): 474 MS
EKG QTC CALCULATION (BAZETT): 485 MS
EKG QTC CALCULATION (BAZETT): 497 MS
EKG R AXIS: 79 DEGREES
EKG R AXIS: 81 DEGREES
EKG R AXIS: 83 DEGREES
EKG R AXIS: 90 DEGREES
EKG R AXIS: 90 DEGREES
EKG T AXIS: 56 DEGREES
EKG T AXIS: 57 DEGREES
EKG T AXIS: 61 DEGREES
EKG T AXIS: 69 DEGREES
EKG T AXIS: 76 DEGREES
EKG VENTRICULAR RATE: 108 BPM
EKG VENTRICULAR RATE: 58 BPM
EKG VENTRICULAR RATE: 78 BPM
EKG VENTRICULAR RATE: 92 BPM
EKG VENTRICULAR RATE: 95 BPM
EOSINOPHILS RELATIVE PERCENT: 0 % (ref 0–4)
EOSINOPHILS RELATIVE PERCENT: 0 % (ref 0–4)
EOSINOPHILS RELATIVE PERCENT: 0 % (ref 1–4)
EOSINOPHILS RELATIVE PERCENT: 1 % (ref 0–4)
EOSINOPHILS RELATIVE PERCENT: 1 % (ref 1–4)
EOSINOPHILS RELATIVE PERCENT: 2 % (ref 1–4)
EOSINOPHILS RELATIVE PERCENT: 3 % (ref 1–4)
EOSINOPHILS RELATIVE PERCENT: 3 % (ref 1–4)
EOSINOPHILS RELATIVE PERCENT: 5 % (ref 1–4)
EPITHELIAL CELLS UA: ABNORMAL /HPF (ref 0–5)
FERRITIN: 522 UG/L (ref 13–150)
FERRITIN: 747 UG/L (ref 13–150)
FIO2: ABNORMAL
GFR AFRICAN AMERICAN: 37 ML/MIN
GFR AFRICAN AMERICAN: 37 ML/MIN
GFR AFRICAN AMERICAN: 38 ML/MIN
GFR AFRICAN AMERICAN: 41 ML/MIN
GFR AFRICAN AMERICAN: 45 ML/MIN
GFR AFRICAN AMERICAN: 45 ML/MIN
GFR AFRICAN AMERICAN: 46 ML/MIN
GFR AFRICAN AMERICAN: 50 ML/MIN
GFR AFRICAN AMERICAN: 52 ML/MIN
GFR AFRICAN AMERICAN: 52 ML/MIN
GFR AFRICAN AMERICAN: 53 ML/MIN
GFR AFRICAN AMERICAN: 54 ML/MIN
GFR AFRICAN AMERICAN: 58 ML/MIN
GFR AFRICAN AMERICAN: 58 ML/MIN
GFR AFRICAN AMERICAN: 59 ML/MIN
GFR AFRICAN AMERICAN: >60 ML/MIN
GFR NON-AFRICAN AMERICAN: 31 ML/MIN
GFR NON-AFRICAN AMERICAN: 34 ML/MIN
GFR NON-AFRICAN AMERICAN: 37 ML/MIN
GFR NON-AFRICAN AMERICAN: 37 ML/MIN
GFR NON-AFRICAN AMERICAN: 38 ML/MIN
GFR NON-AFRICAN AMERICAN: 41 ML/MIN
GFR NON-AFRICAN AMERICAN: 43 ML/MIN
GFR NON-AFRICAN AMERICAN: 43 ML/MIN
GFR NON-AFRICAN AMERICAN: 44 ML/MIN
GFR NON-AFRICAN AMERICAN: 44 ML/MIN
GFR NON-AFRICAN AMERICAN: 48 ML/MIN
GFR NON-AFRICAN AMERICAN: 48 ML/MIN
GFR NON-AFRICAN AMERICAN: 49 ML/MIN
GFR NON-AFRICAN AMERICAN: >60 ML/MIN
GFR SERPL CREATININE-BSD FRML MDRD: >60 ML/MIN
GFR SERPL CREATININE-BSD FRML MDRD: ABNORMAL ML/MIN/{1.73_M2}
GFR SERPL CREATININE-BSD FRML MDRD: NORMAL ML/MIN/{1.73_M2}
GLOBULIN: ABNORMAL G/DL (ref 1.5–3.8)
GLOBULIN: ABNORMAL G/DL (ref 1.5–3.8)
GLUCOSE BLD-MCNC: 101 MG/DL (ref 70–99)
GLUCOSE BLD-MCNC: 106 MG/DL (ref 70–99)
GLUCOSE BLD-MCNC: 107 MG/DL (ref 70–99)
GLUCOSE BLD-MCNC: 109 MG/DL (ref 70–99)
GLUCOSE BLD-MCNC: 110 MG/DL (ref 70–99)
GLUCOSE BLD-MCNC: 112 MG/DL (ref 70–99)
GLUCOSE BLD-MCNC: 115 MG/DL (ref 70–99)
GLUCOSE BLD-MCNC: 117 MG/DL (ref 70–99)
GLUCOSE BLD-MCNC: 119 MG/DL (ref 70–99)
GLUCOSE BLD-MCNC: 120 MG/DL (ref 70–99)
GLUCOSE BLD-MCNC: 124 MG/DL (ref 70–99)
GLUCOSE BLD-MCNC: 127 MG/DL (ref 70–99)
GLUCOSE BLD-MCNC: 128 MG/DL (ref 70–99)
GLUCOSE BLD-MCNC: 129 MG/DL (ref 74–100)
GLUCOSE BLD-MCNC: 130 MG/DL (ref 65–105)
GLUCOSE BLD-MCNC: 137 MG/DL (ref 70–99)
GLUCOSE BLD-MCNC: 149 MG/DL (ref 70–99)
GLUCOSE BLD-MCNC: 155 MG/DL (ref 70–99)
GLUCOSE BLD-MCNC: 158 MG/DL (ref 70–99)
GLUCOSE BLD-MCNC: 161 MG/DL (ref 70–99)
GLUCOSE BLD-MCNC: 173 MG/DL (ref 70–99)
GLUCOSE BLD-MCNC: 86 MG/DL (ref 74–100)
GLUCOSE BLD-MCNC: 89 MG/DL (ref 70–99)
GLUCOSE BLD-MCNC: 95 MG/DL (ref 70–99)
GLUCOSE BLD-MCNC: 97 MG/DL (ref 70–99)
GLUCOSE BLD-MCNC: 99 MG/DL (ref 70–99)
GLUCOSE URINE: NEGATIVE
HBA1C MFR BLD: 5.4 %
HCT VFR BLD CALC: 17.1 % (ref 36.3–47.1)
HCT VFR BLD CALC: 21.8 % (ref 36–46)
HCT VFR BLD CALC: 22 % (ref 36–46)
HCT VFR BLD CALC: 22.2 % (ref 36–46)
HCT VFR BLD CALC: 22.4 % (ref 36.3–47.1)
HCT VFR BLD CALC: 23.7 % (ref 36.3–47.1)
HCT VFR BLD CALC: 23.7 % (ref 36–46)
HCT VFR BLD CALC: 24.4 % (ref 36–46)
HCT VFR BLD CALC: 24.9 % (ref 36–46)
HCT VFR BLD CALC: 25.2 % (ref 36–46)
HCT VFR BLD CALC: 25.3 % (ref 36.3–47.1)
HCT VFR BLD CALC: 25.5 % (ref 36–46)
HCT VFR BLD CALC: 27.1 % (ref 36.3–47.1)
HCT VFR BLD CALC: 29.4 % (ref 36.3–47.1)
HCT VFR BLD CALC: 29.5 % (ref 36.3–47.1)
HCT VFR BLD CALC: 30.9 % (ref 36–46)
HCT VFR BLD CALC: 35.5 % (ref 36–46)
HCT VFR BLD CALC: 36.9 % (ref 36–46)
HCT VFR BLD CALC: 39.3 % (ref 36–46)
HCT VFR BLD CALC: 39.4 % (ref 36–46)
HCT VFR BLD CALC: 39.9 % (ref 36–46)
HCT VFR BLD CALC: 41.6 % (ref 36–46)
HEMOGLOBIN: 10.2 G/DL (ref 12–16)
HEMOGLOBIN: 11.9 G/DL (ref 12–16)
HEMOGLOBIN: 12.3 G/DL (ref 12–16)
HEMOGLOBIN: 12.9 G/DL (ref 12–16)
HEMOGLOBIN: 13 G/DL (ref 12–16)
HEMOGLOBIN: 13.1 G/DL (ref 12–16)
HEMOGLOBIN: 13.7 G/DL (ref 12–16)
HEMOGLOBIN: 5.4 G/DL (ref 11.9–15.1)
HEMOGLOBIN: 7.1 G/DL (ref 12–16)
HEMOGLOBIN: 7.2 G/DL (ref 11.9–15.1)
HEMOGLOBIN: 7.2 G/DL (ref 11.9–15.1)
HEMOGLOBIN: 7.2 G/DL (ref 12–16)
HEMOGLOBIN: 7.3 G/DL (ref 12–16)
HEMOGLOBIN: 7.8 G/DL (ref 11.9–15.1)
HEMOGLOBIN: 7.8 G/DL (ref 12–16)
HEMOGLOBIN: 8 G/DL (ref 12–16)
HEMOGLOBIN: 8.3 G/DL (ref 12–16)
HEMOGLOBIN: 8.4 G/DL (ref 12–16)
HEMOGLOBIN: 8.5 G/DL (ref 11.9–15.1)
HEMOGLOBIN: 8.5 G/DL (ref 12–16)
HEMOGLOBIN: 9.8 G/DL (ref 11.9–15.1)
HEMOGLOBIN: 9.9 G/DL (ref 11.9–15.1)
IMMATURE GRANULOCYTES: 0 %
IMMATURE GRANULOCYTES: 0 %
IMMATURE GRANULOCYTES: 1 %
IMMATURE GRANULOCYTES: 10 %
IMMATURE GRANULOCYTES: 2 %
IMMATURE GRANULOCYTES: 4 %
IMMATURE GRANULOCYTES: ABNORMAL %
IMMATURE RETIC FRACT: 23.4 % (ref 2.7–18.3)
INR BLD: 0.9
INR BLD: 1
INR BLD: 1.1
KETONES, URINE: NEGATIVE
LACTATE DEHYDROGENASE: 183 U/L (ref 135–214)
LACTATE DEHYDROGENASE: 199 U/L (ref 135–214)
LACTATE DEHYDROGENASE: 204 U/L (ref 135–214)
LACTIC ACID, SEPSIS WHOLE BLOOD: 1 MMOL/L (ref 0.5–1.9)
LACTIC ACID, SEPSIS WHOLE BLOOD: NORMAL MMOL/L (ref 0.5–1.9)
LACTIC ACID, SEPSIS WHOLE BLOOD: NORMAL MMOL/L (ref 0.5–1.9)
LACTIC ACID, SEPSIS: 0.7 MMOL/L (ref 0.5–1.9)
LACTIC ACID, SEPSIS: 0.7 MMOL/L (ref 0.5–1.9)
LACTIC ACID, SEPSIS: NORMAL MMOL/L (ref 0.5–1.9)
LACTIC ACID, WHOLE BLOOD: ABNORMAL MMOL/L (ref 0.7–2.1)
LACTIC ACID, WHOLE BLOOD: NORMAL MMOL/L (ref 0.7–2.1)
LACTIC ACID: 1.5 MMOL/L (ref 0.5–2.2)
LACTIC ACID: 2.7 MMOL/L (ref 0.5–2.2)
LEUKOCYTE ESTERASE, URINE: NEGATIVE
LIPASE: 11 U/L (ref 13–60)
LIPASE: 12 U/L (ref 13–60)
LYMPHOCYTES # BLD: 1 % (ref 24–44)
LYMPHOCYTES # BLD: 10 % (ref 24–43)
LYMPHOCYTES # BLD: 10 % (ref 24–44)
LYMPHOCYTES # BLD: 10 % (ref 24–44)
LYMPHOCYTES # BLD: 11 % (ref 24–44)
LYMPHOCYTES # BLD: 12 % (ref 24–44)
LYMPHOCYTES # BLD: 13 % (ref 24–44)
LYMPHOCYTES # BLD: 2 % (ref 24–44)
LYMPHOCYTES # BLD: 3 % (ref 24–44)
LYMPHOCYTES # BLD: 4 % (ref 24–44)
LYMPHOCYTES # BLD: 5 % (ref 24–44)
LYMPHOCYTES # BLD: 6 % (ref 24–44)
LYMPHOCYTES # BLD: 7 % (ref 24–44)
LYMPHOCYTES # BLD: 8 % (ref 24–44)
LYMPHOCYTES # BLD: 9 % (ref 24–44)
Lab: ABNORMAL
Lab: NORMAL
MAGNESIUM: 1.5 MG/DL (ref 1.6–2.6)
MAGNESIUM: 1.6 MG/DL (ref 1.6–2.6)
MAGNESIUM: 2 MG/DL (ref 1.6–2.6)
MCH RBC QN AUTO: 27.2 PG (ref 25.2–33.5)
MCH RBC QN AUTO: 27.3 PG (ref 25.2–33.5)
MCH RBC QN AUTO: 27.5 PG (ref 26–34)
MCH RBC QN AUTO: 27.6 PG (ref 26–34)
MCH RBC QN AUTO: 27.9 PG (ref 26–34)
MCH RBC QN AUTO: 28 PG (ref 25.2–33.5)
MCH RBC QN AUTO: 28 PG (ref 25.2–33.5)
MCH RBC QN AUTO: 28 PG (ref 26–34)
MCH RBC QN AUTO: 28 PG (ref 26–34)
MCH RBC QN AUTO: 28.1 PG (ref 25.2–33.5)
MCH RBC QN AUTO: 28.1 PG (ref 26–34)
MCH RBC QN AUTO: 28.2 PG (ref 25.2–33.5)
MCH RBC QN AUTO: 28.2 PG (ref 26–34)
MCH RBC QN AUTO: 28.2 PG (ref 26–34)
MCH RBC QN AUTO: 28.6 PG (ref 26–34)
MCH RBC QN AUTO: 28.8 PG (ref 25.2–33.5)
MCHC RBC AUTO-ENTMCNC: 30.4 G/DL (ref 28.4–34.8)
MCHC RBC AUTO-ENTMCNC: 30.8 G/DL (ref 28.4–34.8)
MCHC RBC AUTO-ENTMCNC: 31.4 G/DL (ref 28.4–34.8)
MCHC RBC AUTO-ENTMCNC: 31.6 G/DL (ref 28.4–34.8)
MCHC RBC AUTO-ENTMCNC: 32.1 G/DL (ref 28.4–34.8)
MCHC RBC AUTO-ENTMCNC: 32.7 G/DL (ref 31–37)
MCHC RBC AUTO-ENTMCNC: 32.7 G/DL (ref 31–37)
MCHC RBC AUTO-ENTMCNC: 32.8 G/DL (ref 31–37)
MCHC RBC AUTO-ENTMCNC: 33 G/DL (ref 31–37)
MCHC RBC AUTO-ENTMCNC: 33.1 G/DL (ref 31–37)
MCHC RBC AUTO-ENTMCNC: 33.1 G/DL (ref 31–37)
MCHC RBC AUTO-ENTMCNC: 33.3 G/DL (ref 28.4–34.8)
MCHC RBC AUTO-ENTMCNC: 33.3 G/DL (ref 31–37)
MCHC RBC AUTO-ENTMCNC: 33.5 G/DL (ref 31–37)
MCHC RBC AUTO-ENTMCNC: 33.6 G/DL (ref 28.4–34.8)
MCHC RBC AUTO-ENTMCNC: 33.9 G/DL (ref 31–37)
MCV RBC AUTO: 83 FL (ref 80–100)
MCV RBC AUTO: 83.3 FL (ref 80–100)
MCV RBC AUTO: 83.7 FL (ref 80–100)
MCV RBC AUTO: 83.8 FL (ref 80–100)
MCV RBC AUTO: 83.8 FL (ref 80–100)
MCV RBC AUTO: 84 FL (ref 80–100)
MCV RBC AUTO: 84.2 FL (ref 80–100)
MCV RBC AUTO: 84.2 FL (ref 82.6–102.9)
MCV RBC AUTO: 84.7 FL (ref 80–100)
MCV RBC AUTO: 84.8 FL (ref 80–100)
MCV RBC AUTO: 84.8 FL (ref 80–100)
MCV RBC AUTO: 85.1 FL (ref 80–100)
MCV RBC AUTO: 85.3 FL (ref 80–100)
MCV RBC AUTO: 85.6 FL (ref 80–100)
MCV RBC AUTO: 85.7 FL (ref 80–100)
MCV RBC AUTO: 85.8 FL (ref 82.6–102.9)
MCV RBC AUTO: 86 FL (ref 80–100)
MCV RBC AUTO: 87.8 FL (ref 82.6–102.9)
MCV RBC AUTO: 88.5 FL (ref 82.6–102.9)
MCV RBC AUTO: 88.6 FL (ref 82.6–102.9)
MCV RBC AUTO: 89.1 FL (ref 82.6–102.9)
MCV RBC AUTO: 89.4 FL (ref 82.6–102.9)
METAMYELOCYTES ABSOLUTE COUNT: 0.03 K/UL
METAMYELOCYTES ABSOLUTE COUNT: 0.05 K/UL
METAMYELOCYTES ABSOLUTE COUNT: 0.12 K/UL
METAMYELOCYTES ABSOLUTE COUNT: 0.21 K/UL
METAMYELOCYTES: 1 %
METAMYELOCYTES: 1 %
METAMYELOCYTES: 2 %
METAMYELOCYTES: 3 %
MODE: ABNORMAL
MONOCYTES # BLD: 1 % (ref 2–11)
MONOCYTES # BLD: 10 % (ref 1–7)
MONOCYTES # BLD: 10 % (ref 2–11)
MONOCYTES # BLD: 10 % (ref 3–12)
MONOCYTES # BLD: 11 % (ref 1–7)
MONOCYTES # BLD: 2 % (ref 1–7)
MONOCYTES # BLD: 3 % (ref 1–7)
MONOCYTES # BLD: 3 % (ref 2–11)
MONOCYTES # BLD: 4 % (ref 1–7)
MONOCYTES # BLD: 5 % (ref 1–7)
MONOCYTES # BLD: 5 % (ref 1–7)
MONOCYTES # BLD: 6 % (ref 1–7)
MONOCYTES # BLD: 7 % (ref 1–7)
MONOCYTES # BLD: 7 % (ref 1–7)
MONOCYTES # BLD: 9 % (ref 1–7)
MONOCYTES # BLD: 9 % (ref 1–7)
MONOCYTES # BLD: 9 % (ref 2–11)
MORPHOLOGY: ABNORMAL
MORPHOLOGY: NORMAL
MUCUS: ABNORMAL
MYELOCYTES ABSOLUTE COUNT: 0.07 K/UL
MYELOCYTES: 1 %
MYOGLOBIN: 29 NG/ML (ref 25–58)
MYOGLOBIN: 36 NG/ML (ref 25–58)
MYOGLOBIN: 39 NG/ML (ref 25–58)
MYOGLOBIN: 52 NG/ML (ref 25–58)
NEGATIVE BASE EXCESS, ART: ABNORMAL (ref 0–2)
NITRITE, URINE: POSITIVE
NRBC AUTOMATED: 0 PER 100 WBC
NRBC AUTOMATED: ABNORMAL PER 100 WBC
O2 DEVICE/FLOW/%: ABNORMAL
OSMOLALITY URINE: 327 MOSM/KG (ref 80–1300)
OTHER OBSERVATIONS UA: ABNORMAL
PARTIAL THROMBOPLASTIN TIME: 25.6 SEC (ref 23–31)
PARTIAL THROMBOPLASTIN TIME: 26.8 SEC (ref 21.3–31.3)
PATIENT TEMP: ABNORMAL
PDW BLD-RTO: 15.2 % (ref 12.5–15.4)
PDW BLD-RTO: 15.2 % (ref 12.5–15.4)
PDW BLD-RTO: 15.4 % (ref 12.5–15.4)
PDW BLD-RTO: 15.4 % (ref 12.5–15.4)
PDW BLD-RTO: 15.9 % (ref 12.5–15.4)
PDW BLD-RTO: 16.2 % (ref 12.5–15.4)
PDW BLD-RTO: 16.7 % (ref 11.8–14.4)
PDW BLD-RTO: 16.8 % (ref 11.8–14.4)
PDW BLD-RTO: 16.9 % (ref 11.8–14.4)
PDW BLD-RTO: 16.9 % (ref 11.8–14.4)
PDW BLD-RTO: 16.9 % (ref 12.5–15.4)
PDW BLD-RTO: 17.4 % (ref 11.8–14.4)
PDW BLD-RTO: 17.7 % (ref 11.5–14.9)
PDW BLD-RTO: 17.7 % (ref 11.8–14.4)
PDW BLD-RTO: 17.8 % (ref 11.8–14.4)
PDW BLD-RTO: 18 % (ref 12.5–15.4)
PDW BLD-RTO: 18.1 % (ref 11.5–14.9)
PDW BLD-RTO: 18.3 % (ref 11.5–14.9)
PDW BLD-RTO: 18.4 % (ref 12.5–15.4)
PDW BLD-RTO: 18.5 % (ref 12.5–15.4)
PDW BLD-RTO: 18.6 % (ref 12.5–15.4)
PDW BLD-RTO: 18.7 % (ref 12.5–15.4)
PH UA: 6 (ref 5–8)
PHOSPHORUS: 1.4 MG/DL (ref 2.6–4.5)
PHOSPHORUS: 2.7 MG/DL (ref 2.6–4.5)
PLATELET # BLD: 109 K/UL (ref 138–453)
PLATELET # BLD: 129 K/UL (ref 138–453)
PLATELET # BLD: 149 K/UL (ref 140–450)
PLATELET # BLD: 167 K/UL (ref 140–450)
PLATELET # BLD: 170 K/UL (ref 140–450)
PLATELET # BLD: 175 K/UL (ref 140–450)
PLATELET # BLD: 179 K/UL (ref 140–450)
PLATELET # BLD: 182 K/UL (ref 138–453)
PLATELET # BLD: 182 K/UL (ref 138–453)
PLATELET # BLD: 186 K/UL (ref 140–450)
PLATELET # BLD: 200 K/UL (ref 140–450)
PLATELET # BLD: 230 K/UL (ref 150–450)
PLATELET # BLD: 243 K/UL (ref 150–450)
PLATELET # BLD: 252 K/UL (ref 150–450)
PLATELET # BLD: 288 K/UL (ref 138–453)
PLATELET # BLD: 295 K/UL (ref 138–453)
PLATELET # BLD: 314 K/UL (ref 138–453)
PLATELET # BLD: 336 K/UL (ref 140–450)
PLATELET # BLD: 341 K/UL (ref 140–450)
PLATELET # BLD: 74 K/UL (ref 140–450)
PLATELET # BLD: 94 K/UL (ref 140–450)
PLATELET # BLD: ABNORMAL K/UL (ref 138–453)
PLATELET # BLD: ABNORMAL K/UL (ref 140–450)
PLATELET ESTIMATE: ABNORMAL
PLATELET, FLUORESCENCE: 138 K/UL (ref 138–453)
PLATELET, IMMATURE FRACTION: 7 % (ref 1.1–10.3)
PMV BLD AUTO: 10.1 FL (ref 6–12)
PMV BLD AUTO: 10.3 FL (ref 8.1–13.5)
PMV BLD AUTO: 10.4 FL (ref 8.1–13.5)
PMV BLD AUTO: 10.4 FL (ref 8.1–13.5)
PMV BLD AUTO: 10.5 FL (ref 8.1–13.5)
PMV BLD AUTO: 11.8 FL (ref 8.1–13.5)
PMV BLD AUTO: 12.6 FL (ref 8.1–13.5)
PMV BLD AUTO: 7.4 FL (ref 6–12)
PMV BLD AUTO: 7.4 FL (ref 6–12)
PMV BLD AUTO: 7.8 FL (ref 6–12)
PMV BLD AUTO: 7.9 FL (ref 6–12)
PMV BLD AUTO: 8.1 FL (ref 6–12)
PMV BLD AUTO: 8.4 FL (ref 6–12)
PMV BLD AUTO: 8.6 FL (ref 6–12)
PMV BLD AUTO: 8.8 FL (ref 6–12)
PMV BLD AUTO: 9.4 FL (ref 6–12)
PMV BLD AUTO: ABNORMAL FL (ref 6–12)
PMV BLD AUTO: ABNORMAL FL (ref 8.1–13.5)
POC CREATININE: 0.77 MG/DL (ref 0.51–1.19)
POC HCO3: 26.2 MMOL/L (ref 21–28)
POC O2 SATURATION: 96 % (ref 94–98)
POC PCO2 TEMP: ABNORMAL MM HG
POC PCO2: 39.1 MM HG (ref 35–48)
POC PH TEMP: ABNORMAL
POC PH: 7.43 (ref 7.35–7.45)
POC PO2 TEMP: ABNORMAL MM HG
POC PO2: 77.7 MM HG (ref 83–108)
POSITIVE BASE EXCESS, ART: 2 (ref 0–3)
POTASSIUM SERPL-SCNC: 2.8 MMOL/L (ref 3.7–5.3)
POTASSIUM SERPL-SCNC: 3.1 MMOL/L (ref 3.7–5.3)
POTASSIUM SERPL-SCNC: 3.1 MMOL/L (ref 3.7–5.3)
POTASSIUM SERPL-SCNC: 3.3 MMOL/L (ref 3.7–5.3)
POTASSIUM SERPL-SCNC: 3.5 MMOL/L (ref 3.7–5.3)
POTASSIUM SERPL-SCNC: 3.6 MMOL/L (ref 3.7–5.3)
POTASSIUM SERPL-SCNC: 3.7 MMOL/L (ref 3.7–5.3)
POTASSIUM SERPL-SCNC: 3.9 MMOL/L (ref 3.7–5.3)
POTASSIUM SERPL-SCNC: 3.9 MMOL/L (ref 3.7–5.3)
POTASSIUM SERPL-SCNC: 4 MMOL/L (ref 3.7–5.3)
POTASSIUM SERPL-SCNC: 4.1 MMOL/L (ref 3.7–5.3)
POTASSIUM SERPL-SCNC: 4.2 MMOL/L (ref 3.7–5.3)
POTASSIUM SERPL-SCNC: 4.4 MMOL/L (ref 3.7–5.3)
POTASSIUM SERPL-SCNC: 4.4 MMOL/L (ref 3.7–5.3)
POTASSIUM SERPL-SCNC: 4.7 MMOL/L (ref 3.7–5.3)
POTASSIUM SERPL-SCNC: 5.1 MMOL/L (ref 3.7–5.3)
PRO-BNP: 5846 PG/ML
PROCALCITONIN: 0.1 NG/ML
PROCALCITONIN: 0.24 NG/ML
PROCALCITONIN: 0.32 NG/ML
PROCALCITONIN: 10.86 NG/ML
PROTEIN UA: ABNORMAL
PROTHROMBIN TIME: 10.1 SEC (ref 9.4–12.6)
PROTHROMBIN TIME: 10.3 SEC (ref 9.4–12.6)
PROTHROMBIN TIME: 10.3 SEC (ref 9.7–11.6)
PROTHROMBIN TIME: 10.4 SEC (ref 9.4–12.6)
PROTHROMBIN TIME: 13.7 SEC (ref 11.8–14.6)
PROTHROMBIN TIME: 9.6 SEC (ref 9–12)
RBC # BLD: 1.93 M/UL (ref 3.95–5.11)
RBC # BLD: 2.55 M/UL (ref 3.95–5.11)
RBC # BLD: 2.55 M/UL (ref 4–5.2)
RBC # BLD: 2.58 M/UL (ref 4–5.2)
RBC # BLD: 2.6 M/UL (ref 4–5.2)
RBC # BLD: 2.65 M/UL (ref 3.95–5.11)
RBC # BLD: 2.77 M/UL (ref 4–5.2)
RBC # BLD: 2.86 M/UL (ref 3.95–5.11)
RBC # BLD: 2.87 M/UL (ref 4–5.2)
RBC # BLD: 2.94 M/UL (ref 4–5.2)
RBC # BLD: 2.98 M/UL (ref 4–5.2)
RBC # BLD: 2.99 M/UL (ref 4–5.2)
RBC # BLD: 3.04 M/UL (ref 3.95–5.11)
RBC # BLD: 3.44 M/UL (ref 3.95–5.11)
RBC # BLD: 3.49 M/UL (ref 3.95–5.11)
RBC # BLD: 3.65 M/UL (ref 4–5.2)
RBC # BLD: 4.23 M/UL (ref 4–5.2)
RBC # BLD: 4.41 M/UL (ref 4–5.2)
RBC # BLD: 4.69 M/UL (ref 4–5.2)
RBC # BLD: 4.73 M/UL (ref 4–5.2)
RBC # BLD: 4.79 M/UL (ref 4–5.2)
RBC # BLD: 4.97 M/UL (ref 4–5.2)
RBC # BLD: ABNORMAL 10*6/UL
RBC UA: ABNORMAL /HPF (ref 0–2)
RENAL EPITHELIAL, UA: ABNORMAL /HPF
RETIC %: 4 % (ref 0.5–1.9)
RETIC HEMOGLOBIN: 28.1 PG (ref 28.2–35.7)
SAMPLE SITE: ABNORMAL
SARS-COV-2, PCR: NORMAL
SARS-COV-2, RAPID: NORMAL
SARS-COV-2: NOT DETECTED
SEG NEUTROPHILS: 76 % (ref 36–65)
SEG NEUTROPHILS: 77 % (ref 36–66)
SEG NEUTROPHILS: 77 % (ref 36–66)
SEG NEUTROPHILS: 78 % (ref 36–66)
SEG NEUTROPHILS: 78 % (ref 36–66)
SEG NEUTROPHILS: 79 % (ref 36–66)
SEG NEUTROPHILS: 80 % (ref 36–66)
SEG NEUTROPHILS: 81 % (ref 36–66)
SEG NEUTROPHILS: 84 % (ref 36–66)
SEG NEUTROPHILS: 86 % (ref 36–66)
SEG NEUTROPHILS: 86 % (ref 36–66)
SEG NEUTROPHILS: 88 % (ref 36–66)
SEG NEUTROPHILS: 90 % (ref 36–66)
SEG NEUTROPHILS: 91 % (ref 36–66)
SEG NEUTROPHILS: 92 % (ref 36–66)
SEG NEUTROPHILS: 95 % (ref 36–66)
SEGMENTED NEUTROPHILS ABSOLUTE COUNT: 2.54 K/UL (ref 1.8–7.7)
SEGMENTED NEUTROPHILS ABSOLUTE COUNT: 2.73 K/UL (ref 1.8–7.7)
SEGMENTED NEUTROPHILS ABSOLUTE COUNT: 2.93 K/UL (ref 1.8–7.7)
SEGMENTED NEUTROPHILS ABSOLUTE COUNT: 3.32 K/UL (ref 1.8–7.7)
SEGMENTED NEUTROPHILS ABSOLUTE COUNT: 3.58 K/UL (ref 1.8–7.7)
SEGMENTED NEUTROPHILS ABSOLUTE COUNT: 4.09 K/UL (ref 1.8–7.7)
SEGMENTED NEUTROPHILS ABSOLUTE COUNT: 4.37 K/UL (ref 1.3–9.1)
SEGMENTED NEUTROPHILS ABSOLUTE COUNT: 4.39 K/UL (ref 1.8–7.7)
SEGMENTED NEUTROPHILS ABSOLUTE COUNT: 4.8 K/UL (ref 1.8–7.7)
SEGMENTED NEUTROPHILS ABSOLUTE COUNT: 5 K/UL (ref 1.8–7.7)
SEGMENTED NEUTROPHILS ABSOLUTE COUNT: 5.21 K/UL (ref 1.8–7.7)
SEGMENTED NEUTROPHILS ABSOLUTE COUNT: 5.28 K/UL (ref 1.8–7.7)
SEGMENTED NEUTROPHILS ABSOLUTE COUNT: 5.42 K/UL (ref 1.8–7.7)
SEGMENTED NEUTROPHILS ABSOLUTE COUNT: 5.42 K/UL (ref 1.8–7.7)
SEGMENTED NEUTROPHILS ABSOLUTE COUNT: 5.5 K/UL (ref 1.8–7.7)
SEGMENTED NEUTROPHILS ABSOLUTE COUNT: 5.6 K/UL (ref 1.8–7.7)
SEGMENTED NEUTROPHILS ABSOLUTE COUNT: 5.65 K/UL (ref 1.3–9.1)
SEGMENTED NEUTROPHILS ABSOLUTE COUNT: 5.66 K/UL (ref 1.5–8.1)
SEGMENTED NEUTROPHILS ABSOLUTE COUNT: 5.85 K/UL (ref 1.8–7.7)
SEGMENTED NEUTROPHILS ABSOLUTE COUNT: 5.88 K/UL (ref 1.3–9.1)
SEGMENTED NEUTROPHILS ABSOLUTE COUNT: 6.02 K/UL (ref 1.8–7.7)
SODIUM BLD-SCNC: 125 MMOL/L (ref 135–144)
SODIUM BLD-SCNC: 127 MMOL/L (ref 135–144)
SODIUM BLD-SCNC: 128 MMOL/L (ref 135–144)
SODIUM BLD-SCNC: 129 MMOL/L (ref 135–144)
SODIUM BLD-SCNC: 129 MMOL/L (ref 135–144)
SODIUM BLD-SCNC: 130 MMOL/L (ref 135–144)
SODIUM BLD-SCNC: 131 MMOL/L (ref 135–144)
SODIUM BLD-SCNC: 131 MMOL/L (ref 135–144)
SODIUM BLD-SCNC: 132 MMOL/L (ref 135–144)
SODIUM BLD-SCNC: 132 MMOL/L (ref 135–144)
SODIUM BLD-SCNC: 134 MMOL/L (ref 135–144)
SODIUM BLD-SCNC: 135 MMOL/L (ref 135–144)
SODIUM BLD-SCNC: 136 MMOL/L (ref 135–144)
SODIUM BLD-SCNC: 136 MMOL/L (ref 135–144)
SODIUM BLD-SCNC: 137 MMOL/L (ref 135–144)
SODIUM BLD-SCNC: 139 MMOL/L (ref 135–144)
SODIUM BLD-SCNC: 140 MMOL/L (ref 135–144)
SODIUM BLD-SCNC: 141 MMOL/L (ref 135–144)
SODIUM BLD-SCNC: 142 MMOL/L (ref 135–144)
SODIUM,UR: 20 MMOL/L
SOURCE: NORMAL
SPECIFIC GRAVITY UA: 1.02 (ref 1–1.03)
SPECIMEN DESCRIPTION: ABNORMAL
SPECIMEN DESCRIPTION: NORMAL
SURGICAL PATHOLOGY REPORT: NORMAL
TCO2 (CALC), ART: 27 MMOL/L (ref 22–29)
TOTAL CK: 13 U/L (ref 26–192)
TOTAL CK: 15 U/L (ref 26–192)
TOTAL CK: 15 U/L (ref 26–192)
TOTAL CK: 17 U/L (ref 26–192)
TOTAL CK: 17 U/L (ref 26–192)
TOTAL CK: 20 U/L (ref 26–192)
TOTAL CK: 27 U/L (ref 26–192)
TOTAL PROTEIN, URINE: 38 MG/DL
TOTAL PROTEIN: 5.5 G/DL (ref 6.4–8.3)
TOTAL PROTEIN: 5.7 G/DL (ref 6.4–8.3)
TOTAL PROTEIN: 6.5 G/DL (ref 6.4–8.3)
TOTAL PROTEIN: 6.6 G/DL (ref 6.4–8.3)
TOTAL PROTEIN: 6.7 G/DL (ref 6.4–8.3)
TOTAL PROTEIN: 6.8 G/DL (ref 6.4–8.3)
TOTAL PROTEIN: 6.8 G/DL (ref 6.4–8.3)
TOTAL PROTEIN: 6.9 G/DL (ref 6.4–8.3)
TOTAL PROTEIN: 7.1 G/DL (ref 6.4–8.3)
TOTAL PROTEIN: 7.3 G/DL (ref 6.4–8.3)
TRICHOMONAS: ABNORMAL
TROPONIN INTERP: ABNORMAL
TROPONIN INTERP: NORMAL
TROPONIN T: ABNORMAL NG/ML
TROPONIN T: NORMAL NG/ML
TROPONIN, HIGH SENSITIVITY: 13 NG/L (ref 0–14)
TROPONIN, HIGH SENSITIVITY: 13 NG/L (ref 0–14)
TROPONIN, HIGH SENSITIVITY: 14 NG/L (ref 0–14)
TROPONIN, HIGH SENSITIVITY: 15 NG/L (ref 0–14)
TROPONIN, HIGH SENSITIVITY: 15 NG/L (ref 0–14)
TROPONIN, HIGH SENSITIVITY: 34 NG/L (ref 0–14)
TROPONIN, HIGH SENSITIVITY: 34 NG/L (ref 0–14)
TROPONIN, HIGH SENSITIVITY: 35 NG/L (ref 0–14)
TROPONIN, HIGH SENSITIVITY: 41 NG/L (ref 0–14)
TROPONIN, HIGH SENSITIVITY: 48 NG/L (ref 0–14)
TURBIDITY: CLEAR
URIC ACID: 3.1 MG/DL (ref 2.4–5.7)
URINE HGB: ABNORMAL
UROBILINOGEN, URINE: NORMAL
VANCOMYCIN RANDOM DATE LAST DOSE: NORMAL
VANCOMYCIN RANDOM DOSE AMOUNT: NORMAL
VANCOMYCIN RANDOM TIME LAST DOSE: NORMAL
VANCOMYCIN RANDOM: 13 UG/ML
VANCOMYCIN TROUGH DATE LAST DOSE: ABNORMAL
VANCOMYCIN TROUGH DATE LAST DOSE: ABNORMAL
VANCOMYCIN TROUGH DATE LAST DOSE: NORMAL
VANCOMYCIN TROUGH DOSE AMOUNT: ABNORMAL
VANCOMYCIN TROUGH DOSE AMOUNT: ABNORMAL
VANCOMYCIN TROUGH DOSE AMOUNT: NORMAL
VANCOMYCIN TROUGH TIME LAST DOSE: 332
VANCOMYCIN TROUGH TIME LAST DOSE: ABNORMAL
VANCOMYCIN TROUGH TIME LAST DOSE: ABNORMAL
VANCOMYCIN TROUGH: 18.3 UG/ML (ref 10–20)
VANCOMYCIN TROUGH: 9.6 UG/ML (ref 10–20)
VANCOMYCIN TROUGH: <4 UG/ML (ref 10–20)
WBC # BLD: 2.8 K/UL (ref 3.5–11)
WBC # BLD: 3.1 K/UL (ref 3.5–11)
WBC # BLD: 3.5 K/UL (ref 3.5–11)
WBC # BLD: 3.5 K/UL (ref 3.5–11)
WBC # BLD: 4.5 K/UL (ref 3.5–11)
WBC # BLD: 4.5 K/UL (ref 3.5–11)
WBC # BLD: 5.5 K/UL (ref 3.5–11.3)
WBC # BLD: 5.6 K/UL (ref 3.5–11)
WBC # BLD: 5.7 K/UL (ref 3.5–11.3)
WBC # BLD: 5.8 K/UL (ref 3.5–11)
WBC # BLD: 5.9 K/UL (ref 3.5–11)
WBC # BLD: 6.2 K/UL (ref 3.5–11)
WBC # BLD: 6.2 K/UL (ref 3.5–11.3)
WBC # BLD: 6.3 K/UL (ref 3.5–11.3)
WBC # BLD: 6.4 K/UL (ref 3.5–11)
WBC # BLD: 6.8 K/UL (ref 3.5–11.3)
WBC # BLD: 7 K/UL (ref 3.5–11)
WBC # BLD: 7 K/UL (ref 3.5–11)
WBC # BLD: 7.4 K/UL (ref 3.5–11.3)
WBC # BLD: 7.5 K/UL (ref 3.5–11.3)
WBC # BLD: ABNORMAL 10*3/UL
WBC UA: ABNORMAL /HPF (ref 0–5)
YEAST: ABNORMAL

## 2020-01-01 PROCEDURE — G8417 CALC BMI ABV UP PARAM F/U: HCPCS | Performed by: INTERNAL MEDICINE

## 2020-01-01 PROCEDURE — 71260 CT THORAX DX C+: CPT

## 2020-01-01 PROCEDURE — 77386 HC NTSTY MODUL RAD TX DLVR CPLX: CPT | Performed by: RADIOLOGY

## 2020-01-01 PROCEDURE — G8399 PT W/DXA RESULTS DOCUMENT: HCPCS | Performed by: INTERNAL MEDICINE

## 2020-01-01 PROCEDURE — 2580000003 HC RX 258: Performed by: ANESTHESIOLOGY

## 2020-01-01 PROCEDURE — 83605 ASSAY OF LACTIC ACID: CPT

## 2020-01-01 PROCEDURE — 4040F PNEUMOC VAC/ADMIN/RCVD: CPT | Performed by: INTERNAL MEDICINE

## 2020-01-01 PROCEDURE — 1123F ACP DISCUSS/DSCN MKR DOCD: CPT | Performed by: INTERNAL MEDICINE

## 2020-01-01 PROCEDURE — 82565 ASSAY OF CREATININE: CPT

## 2020-01-01 PROCEDURE — G8427 DOCREV CUR MEDS BY ELIG CLIN: HCPCS | Performed by: INTERNAL MEDICINE

## 2020-01-01 PROCEDURE — 83874 ASSAY OF MYOGLOBIN: CPT

## 2020-01-01 PROCEDURE — 80053 COMPREHEN METABOLIC PANEL: CPT

## 2020-01-01 PROCEDURE — 2700000000 HC OXYGEN THERAPY PER DAY

## 2020-01-01 PROCEDURE — 6370000000 HC RX 637 (ALT 250 FOR IP): Performed by: NURSE PRACTITIONER

## 2020-01-01 PROCEDURE — 84295 ASSAY OF SERUM SODIUM: CPT

## 2020-01-01 PROCEDURE — G8427 DOCREV CUR MEDS BY ELIG CLIN: HCPCS | Performed by: FAMILY MEDICINE

## 2020-01-01 PROCEDURE — 84145 PROCALCITONIN (PCT): CPT

## 2020-01-01 PROCEDURE — 80048 BASIC METABOLIC PNL TOTAL CA: CPT

## 2020-01-01 PROCEDURE — 2060000000 HC ICU INTERMEDIATE R&B

## 2020-01-01 PROCEDURE — 71045 X-RAY EXAM CHEST 1 VIEW: CPT

## 2020-01-01 PROCEDURE — G8482 FLU IMMUNIZE ORDER/ADMIN: HCPCS | Performed by: INTERNAL MEDICINE

## 2020-01-01 PROCEDURE — 2580000003 HC RX 258: Performed by: NURSE PRACTITIONER

## 2020-01-01 PROCEDURE — 7100000011 HC PHASE II RECOVERY - ADDTL 15 MIN

## 2020-01-01 PROCEDURE — 94727 GAS DIL/WSHOT DETER LNG VOL: CPT

## 2020-01-01 PROCEDURE — 3017F COLORECTAL CA SCREEN DOC REV: CPT | Performed by: INTERNAL MEDICINE

## 2020-01-01 PROCEDURE — 2580000003 HC RX 258: Performed by: INTERNAL MEDICINE

## 2020-01-01 PROCEDURE — 3023F SPIROM DOC REV: CPT | Performed by: INTERNAL MEDICINE

## 2020-01-01 PROCEDURE — 94640 AIRWAY INHALATION TREATMENT: CPT

## 2020-01-01 PROCEDURE — 84132 ASSAY OF SERUM POTASSIUM: CPT

## 2020-01-01 PROCEDURE — 2500000003 HC RX 250 WO HCPCS: Performed by: INTERNAL MEDICINE

## 2020-01-01 PROCEDURE — 1036F TOBACCO NON-USER: CPT | Performed by: INTERNAL MEDICINE

## 2020-01-01 PROCEDURE — 85730 THROMBOPLASTIN TIME PARTIAL: CPT

## 2020-01-01 PROCEDURE — 87040 BLOOD CULTURE FOR BACTERIA: CPT

## 2020-01-01 PROCEDURE — 36590 REMOVAL TUNNELED CV CATH: CPT | Performed by: RADIOLOGY

## 2020-01-01 PROCEDURE — 86403 PARTICLE AGGLUT ANTBDY SCRN: CPT

## 2020-01-01 PROCEDURE — 96413 CHEMO IV INFUSION 1 HR: CPT | Performed by: NURSE PRACTITIONER

## 2020-01-01 PROCEDURE — 81001 URINALYSIS AUTO W/SCOPE: CPT

## 2020-01-01 PROCEDURE — 96417 CHEMO IV INFUS EACH ADDL SEQ: CPT

## 2020-01-01 PROCEDURE — 87150 DNA/RNA AMPLIFIED PROBE: CPT

## 2020-01-01 PROCEDURE — 77334 RADIATION TREATMENT AID(S): CPT | Performed by: RADIOLOGY

## 2020-01-01 PROCEDURE — 96361 HYDRATE IV INFUSION ADD-ON: CPT | Performed by: NURSE PRACTITIONER

## 2020-01-01 PROCEDURE — 1800000000 HC LEAVE OF ABSENCE

## 2020-01-01 PROCEDURE — 51798 US URINE CAPACITY MEASURE: CPT

## 2020-01-01 PROCEDURE — G8417 CALC BMI ABV UP PARAM F/U: HCPCS | Performed by: SPECIALIST

## 2020-01-01 PROCEDURE — 99232 SBSQ HOSP IP/OBS MODERATE 35: CPT | Performed by: INTERNAL MEDICINE

## 2020-01-01 PROCEDURE — 1090F PRES/ABSN URINE INCON ASSESS: CPT | Performed by: INTERNAL MEDICINE

## 2020-01-01 PROCEDURE — 84520 ASSAY OF UREA NITROGEN: CPT

## 2020-01-01 PROCEDURE — 83735 ASSAY OF MAGNESIUM: CPT

## 2020-01-01 PROCEDURE — 6370000000 HC RX 637 (ALT 250 FOR IP): Performed by: FAMILY MEDICINE

## 2020-01-01 PROCEDURE — 85055 RETICULATED PLATELET ASSAY: CPT

## 2020-01-01 PROCEDURE — APPSS45 APP SPLIT SHARED TIME 31-45 MINUTES: Performed by: NURSE PRACTITIONER

## 2020-01-01 PROCEDURE — 71046 X-RAY EXAM CHEST 2 VIEWS: CPT

## 2020-01-01 PROCEDURE — 6360000002 HC RX W HCPCS: Performed by: EMERGENCY MEDICINE

## 2020-01-01 PROCEDURE — 85025 COMPLETE CBC W/AUTO DIFF WBC: CPT

## 2020-01-01 PROCEDURE — 6370000000 HC RX 637 (ALT 250 FOR IP): Performed by: INTERNAL MEDICINE

## 2020-01-01 PROCEDURE — 88305 TISSUE EXAM BY PATHOLOGIST: CPT

## 2020-01-01 PROCEDURE — 99211 OFF/OP EST MAY X REQ PHY/QHP: CPT

## 2020-01-01 PROCEDURE — 6360000002 HC RX W HCPCS: Performed by: NURSE ANESTHETIST, CERTIFIED REGISTERED

## 2020-01-01 PROCEDURE — G8399 PT W/DXA RESULTS DOCUMENT: HCPCS | Performed by: SPECIALIST

## 2020-01-01 PROCEDURE — 77336 RADIATION PHYSICS CONSULT: CPT | Performed by: RADIOLOGY

## 2020-01-01 PROCEDURE — 99214 OFFICE O/P EST MOD 30 MIN: CPT | Performed by: INTERNAL MEDICINE

## 2020-01-01 PROCEDURE — 99204 OFFICE O/P NEW MOD 45 MIN: CPT | Performed by: INTERNAL MEDICINE

## 2020-01-01 PROCEDURE — 94761 N-INVAS EAR/PLS OXIMETRY MLT: CPT

## 2020-01-01 PROCEDURE — 99215 OFFICE O/P EST HI 40 MIN: CPT | Performed by: INTERNAL MEDICINE

## 2020-01-01 PROCEDURE — 96365 THER/PROPH/DIAG IV INF INIT: CPT

## 2020-01-01 PROCEDURE — 82150 ASSAY OF AMYLASE: CPT

## 2020-01-01 PROCEDURE — 85610 PROTHROMBIN TIME: CPT

## 2020-01-01 PROCEDURE — 1200000000 HC SEMI PRIVATE

## 2020-01-01 PROCEDURE — 36600 WITHDRAWAL OF ARTERIAL BLOOD: CPT

## 2020-01-01 PROCEDURE — 1111F DSCHRG MED/CURRENT MED MERGE: CPT | Performed by: INTERNAL MEDICINE

## 2020-01-01 PROCEDURE — 84550 ASSAY OF BLOOD/URIC ACID: CPT

## 2020-01-01 PROCEDURE — 2500000003 HC RX 250 WO HCPCS: Performed by: NURSE PRACTITIONER

## 2020-01-01 PROCEDURE — G8427 DOCREV CUR MEDS BY ELIG CLIN: HCPCS | Performed by: SPECIALIST

## 2020-01-01 PROCEDURE — 51701 INSERT BLADDER CATHETER: CPT

## 2020-01-01 PROCEDURE — 86140 C-REACTIVE PROTEIN: CPT

## 2020-01-01 PROCEDURE — 99232 SBSQ HOSP IP/OBS MODERATE 35: CPT | Performed by: FAMILY MEDICINE

## 2020-01-01 PROCEDURE — 36415 COLL VENOUS BLD VENIPUNCTURE: CPT

## 2020-01-01 PROCEDURE — G8482 FLU IMMUNIZE ORDER/ADMIN: HCPCS | Performed by: FAMILY MEDICINE

## 2020-01-01 PROCEDURE — 82550 ASSAY OF CK (CPK): CPT

## 2020-01-01 PROCEDURE — 77301 RADIOTHERAPY DOSE PLAN IMRT: CPT | Performed by: RADIOLOGY

## 2020-01-01 PROCEDURE — 4040F PNEUMOC VAC/ADMIN/RCVD: CPT | Performed by: FAMILY MEDICINE

## 2020-01-01 PROCEDURE — 85045 AUTOMATED RETICULOCYTE COUNT: CPT

## 2020-01-01 PROCEDURE — 1123F ACP DISCUSS/DSCN MKR DOCD: CPT | Performed by: SPECIALIST

## 2020-01-01 PROCEDURE — 84484 ASSAY OF TROPONIN QUANT: CPT

## 2020-01-01 PROCEDURE — 82330 ASSAY OF CALCIUM: CPT

## 2020-01-01 PROCEDURE — 85049 AUTOMATED PLATELET COUNT: CPT

## 2020-01-01 PROCEDURE — 6360000002 HC RX W HCPCS: Performed by: NURSE PRACTITIONER

## 2020-01-01 PROCEDURE — 3023F SPIROM DOC REV: CPT | Performed by: FAMILY MEDICINE

## 2020-01-01 PROCEDURE — 99239 HOSP IP/OBS DSCHRG MGMT >30: CPT | Performed by: INTERNAL MEDICINE

## 2020-01-01 PROCEDURE — 96415 CHEMO IV INFUSION ADDL HR: CPT

## 2020-01-01 PROCEDURE — 3017F COLORECTAL CA SCREEN DOC REV: CPT | Performed by: SPECIALIST

## 2020-01-01 PROCEDURE — G8926 SPIRO NO PERF OR DOC: HCPCS | Performed by: INTERNAL MEDICINE

## 2020-01-01 PROCEDURE — 6360000002 HC RX W HCPCS: Performed by: INTERNAL MEDICINE

## 2020-01-01 PROCEDURE — 96375 TX/PRO/DX INJ NEW DRUG ADDON: CPT

## 2020-01-01 PROCEDURE — 83880 ASSAY OF NATRIURETIC PEPTIDE: CPT

## 2020-01-01 PROCEDURE — 6360000002 HC RX W HCPCS: Performed by: RADIOLOGY

## 2020-01-01 PROCEDURE — 1090F PRES/ABSN URINE INCON ASSESS: CPT | Performed by: FAMILY MEDICINE

## 2020-01-01 PROCEDURE — 3700000001 HC ADD 15 MINUTES (ANESTHESIA): Performed by: INTERNAL MEDICINE

## 2020-01-01 PROCEDURE — 97535 SELF CARE MNGMENT TRAINING: CPT

## 2020-01-01 PROCEDURE — 84100 ASSAY OF PHOSPHORUS: CPT

## 2020-01-01 PROCEDURE — 94618 PULMONARY STRESS TESTING: CPT

## 2020-01-01 PROCEDURE — 87186 SC STD MICRODIL/AGAR DIL: CPT

## 2020-01-01 PROCEDURE — 97530 THERAPEUTIC ACTIVITIES: CPT

## 2020-01-01 PROCEDURE — G8428 CUR MEDS NOT DOCUMENT: HCPCS | Performed by: INTERNAL MEDICINE

## 2020-01-01 PROCEDURE — 99211 OFF/OP EST MAY X REQ PHY/QHP: CPT | Performed by: INTERNAL MEDICINE

## 2020-01-01 PROCEDURE — 99205 OFFICE O/P NEW HI 60 MIN: CPT | Performed by: INTERNAL MEDICINE

## 2020-01-01 PROCEDURE — 82947 ASSAY GLUCOSE BLOOD QUANT: CPT

## 2020-01-01 PROCEDURE — 83615 LACTATE (LD) (LDH) ENZYME: CPT

## 2020-01-01 PROCEDURE — 96360 HYDRATION IV INFUSION INIT: CPT | Performed by: NURSE PRACTITIONER

## 2020-01-01 PROCEDURE — 82553 CREATINE MB FRACTION: CPT

## 2020-01-01 PROCEDURE — 6360000004 HC RX CONTRAST MEDICATION: Performed by: EMERGENCY MEDICINE

## 2020-01-01 PROCEDURE — 99223 1ST HOSP IP/OBS HIGH 75: CPT | Performed by: INTERNAL MEDICINE

## 2020-01-01 PROCEDURE — 36591 DRAW BLOOD OFF VENOUS DEVICE: CPT | Performed by: NURSE PRACTITIONER

## 2020-01-01 PROCEDURE — 99212 OFFICE O/P EST SF 10 MIN: CPT | Performed by: SPECIALIST

## 2020-01-01 PROCEDURE — 93971 EXTREMITY STUDY: CPT

## 2020-01-01 PROCEDURE — 3017F COLORECTAL CA SCREEN DOC REV: CPT | Performed by: FAMILY MEDICINE

## 2020-01-01 PROCEDURE — 87070 CULTURE OTHR SPECIMN AEROBIC: CPT

## 2020-01-01 PROCEDURE — 4040F PNEUMOC VAC/ADMIN/RCVD: CPT | Performed by: SPECIALIST

## 2020-01-01 PROCEDURE — 76937 US GUIDE VASCULAR ACCESS: CPT

## 2020-01-01 PROCEDURE — 1036F TOBACCO NON-USER: CPT | Performed by: SPECIALIST

## 2020-01-01 PROCEDURE — 82728 ASSAY OF FERRITIN: CPT

## 2020-01-01 PROCEDURE — 87205 SMEAR GRAM STAIN: CPT

## 2020-01-01 PROCEDURE — 97166 OT EVAL MOD COMPLEX 45 MIN: CPT

## 2020-01-01 PROCEDURE — 99233 SBSQ HOSP IP/OBS HIGH 50: CPT | Performed by: INTERNAL MEDICINE

## 2020-01-01 PROCEDURE — 02HV33Z INSERTION OF INFUSION DEVICE INTO SUPERIOR VENA CAVA, PERCUTANEOUS APPROACH: ICD-10-PCS | Performed by: FAMILY MEDICINE

## 2020-01-01 PROCEDURE — 99221 1ST HOSP IP/OBS SF/LOW 40: CPT | Performed by: INTERNAL MEDICINE

## 2020-01-01 PROCEDURE — 2580000003 HC RX 258: Performed by: RADIOLOGY

## 2020-01-01 PROCEDURE — 87147 CULTURE TYPE IMMUNOLOGIC: CPT

## 2020-01-01 PROCEDURE — 94664 DEMO&/EVAL PT USE INHALER: CPT

## 2020-01-01 PROCEDURE — 2709999900 HC NON-CHARGEABLE SUPPLY

## 2020-01-01 PROCEDURE — G8399 PT W/DXA RESULTS DOCUMENT: HCPCS | Performed by: FAMILY MEDICINE

## 2020-01-01 PROCEDURE — 93005 ELECTROCARDIOGRAM TRACING: CPT

## 2020-01-01 PROCEDURE — 1036F TOBACCO NON-USER: CPT | Performed by: FAMILY MEDICINE

## 2020-01-01 PROCEDURE — 6360000002 HC RX W HCPCS

## 2020-01-01 PROCEDURE — 74177 CT ABD & PELVIS W/CONTRAST: CPT

## 2020-01-01 PROCEDURE — 7100000010 HC PHASE II RECOVERY - FIRST 15 MIN: Performed by: INTERNAL MEDICINE

## 2020-01-01 PROCEDURE — 77300 RADIATION THERAPY DOSE PLAN: CPT | Performed by: RADIOLOGY

## 2020-01-01 PROCEDURE — 77470 SPECIAL RADIATION TREATMENT: CPT | Performed by: RADIOLOGY

## 2020-01-01 PROCEDURE — 87086 URINE CULTURE/COLONY COUNT: CPT

## 2020-01-01 PROCEDURE — 7100000011 HC PHASE II RECOVERY - ADDTL 15 MIN: Performed by: INTERNAL MEDICINE

## 2020-01-01 PROCEDURE — C1751 CATH, INF, PER/CENT/MIDLINE: HCPCS

## 2020-01-01 PROCEDURE — 94760 N-INVAS EAR/PLS OXIMETRY 1: CPT

## 2020-01-01 PROCEDURE — 1123F ACP DISCUSS/DSCN MKR DOCD: CPT | Performed by: FAMILY MEDICINE

## 2020-01-01 PROCEDURE — 99212 OFFICE O/P EST SF 10 MIN: CPT | Performed by: RADIOLOGY

## 2020-01-01 PROCEDURE — 93010 ELECTROCARDIOGRAM REPORT: CPT | Performed by: INTERNAL MEDICINE

## 2020-01-01 PROCEDURE — 93005 ELECTROCARDIOGRAM TRACING: CPT | Performed by: NURSE PRACTITIONER

## 2020-01-01 PROCEDURE — 82803 BLOOD GASES ANY COMBINATION: CPT

## 2020-01-01 PROCEDURE — 96413 CHEMO IV INFUSION 1 HR: CPT

## 2020-01-01 PROCEDURE — 99999 PR OFFICE/OUTPT VISIT,PROCEDURE ONLY: CPT | Performed by: INTERNAL MEDICINE

## 2020-01-01 PROCEDURE — 99222 1ST HOSP IP/OBS MODERATE 55: CPT | Performed by: INTERNAL MEDICINE

## 2020-01-01 PROCEDURE — 96417 CHEMO IV INFUS EACH ADDL SEQ: CPT | Performed by: NURSE PRACTITIONER

## 2020-01-01 PROCEDURE — 77338 DESIGN MLC DEVICE FOR IMRT: CPT | Performed by: RADIOLOGY

## 2020-01-01 PROCEDURE — U0002 COVID-19 LAB TEST NON-CDC: HCPCS

## 2020-01-01 PROCEDURE — 80202 ASSAY OF VANCOMYCIN: CPT

## 2020-01-01 PROCEDURE — 83036 HEMOGLOBIN GLYCOSYLATED A1C: CPT | Performed by: FAMILY MEDICINE

## 2020-01-01 PROCEDURE — 3609020000 HC BRONCHOSCOPY W/EBUS FNA: Performed by: INTERNAL MEDICINE

## 2020-01-01 PROCEDURE — 80076 HEPATIC FUNCTION PANEL: CPT

## 2020-01-01 PROCEDURE — 99283 EMERGENCY DEPT VISIT LOW MDM: CPT

## 2020-01-01 PROCEDURE — 1090F PRES/ABSN URINE INCON ASSESS: CPT | Performed by: SPECIALIST

## 2020-01-01 PROCEDURE — G8417 CALC BMI ABV UP PARAM F/U: HCPCS | Performed by: FAMILY MEDICINE

## 2020-01-01 PROCEDURE — 94729 DIFFUSING CAPACITY: CPT

## 2020-01-01 PROCEDURE — 99223 1ST HOSP IP/OBS HIGH 75: CPT | Performed by: FAMILY MEDICINE

## 2020-01-01 PROCEDURE — 83690 ASSAY OF LIPASE: CPT

## 2020-01-01 PROCEDURE — 77001 FLUOROGUIDE FOR VEIN DEVICE: CPT | Performed by: RADIOLOGY

## 2020-01-01 PROCEDURE — 96375 TX/PRO/DX INJ NEW DRUG ADDON: CPT | Performed by: NURSE PRACTITIONER

## 2020-01-01 PROCEDURE — 97110 THERAPEUTIC EXERCISES: CPT

## 2020-01-01 PROCEDURE — 1210000000 HC MED SURG R&B

## 2020-01-01 PROCEDURE — 99285 EMERGENCY DEPT VISIT HI MDM: CPT

## 2020-01-01 PROCEDURE — 76770 US EXAM ABDO BACK WALL COMP: CPT

## 2020-01-01 PROCEDURE — 99152 MOD SED SAME PHYS/QHP 5/>YRS: CPT | Performed by: RADIOLOGY

## 2020-01-01 PROCEDURE — 7100000000 HC PACU RECOVERY - FIRST 15 MIN: Performed by: INTERNAL MEDICINE

## 2020-01-01 PROCEDURE — 7100000010 HC PHASE II RECOVERY - FIRST 15 MIN

## 2020-01-01 PROCEDURE — 36591 DRAW BLOOD OFF VENOUS DEVICE: CPT

## 2020-01-01 PROCEDURE — 2580000003 HC RX 258: Performed by: EMERGENCY MEDICINE

## 2020-01-01 PROCEDURE — 3700000000 HC ANESTHESIA ATTENDED CARE: Performed by: INTERNAL MEDICINE

## 2020-01-01 PROCEDURE — 93005 ELECTROCARDIOGRAM TRACING: CPT | Performed by: ANESTHESIOLOGY

## 2020-01-01 PROCEDURE — 84300 ASSAY OF URINE SODIUM: CPT

## 2020-01-01 PROCEDURE — 6360000004 HC RX CONTRAST MEDICATION: Performed by: INTERNAL MEDICINE

## 2020-01-01 PROCEDURE — 02PYX3Z REMOVAL OF INFUSION DEVICE FROM GREAT VESSEL, EXTERNAL APPROACH: ICD-10-PCS | Performed by: FAMILY MEDICINE

## 2020-01-01 PROCEDURE — 99214 OFFICE O/P EST MOD 30 MIN: CPT | Performed by: FAMILY MEDICINE

## 2020-01-01 PROCEDURE — 84156 ASSAY OF PROTEIN URINE: CPT

## 2020-01-01 PROCEDURE — 7100000001 HC PACU RECOVERY - ADDTL 15 MIN: Performed by: INTERNAL MEDICINE

## 2020-01-01 PROCEDURE — 82140 ASSAY OF AMMONIA: CPT

## 2020-01-01 PROCEDURE — 88172 CYTP DX EVAL FNA 1ST EA SITE: CPT

## 2020-01-01 PROCEDURE — 2500000003 HC RX 250 WO HCPCS: Performed by: NURSE ANESTHETIST, CERTIFIED REGISTERED

## 2020-01-01 PROCEDURE — 1111F DSCHRG MED/CURRENT MED MERGE: CPT | Performed by: SPECIALIST

## 2020-01-01 PROCEDURE — 36561 INSERT TUNNELED CV CATH: CPT | Performed by: RADIOLOGY

## 2020-01-01 PROCEDURE — 6370000000 HC RX 637 (ALT 250 FOR IP): Performed by: EMERGENCY MEDICINE

## 2020-01-01 PROCEDURE — C1894 INTRO/SHEATH, NON-LASER: HCPCS

## 2020-01-01 PROCEDURE — 96374 THER/PROPH/DIAG INJ IV PUSH: CPT

## 2020-01-01 PROCEDURE — 31652 BRONCH EBUS SAMPLNG 1/2 NODE: CPT | Performed by: INTERNAL MEDICINE

## 2020-01-01 PROCEDURE — 97162 PT EVAL MOD COMPLEX 30 MIN: CPT

## 2020-01-01 PROCEDURE — 77293 RESPIRATOR MOTION MGMT SIMUL: CPT | Performed by: RADIOLOGY

## 2020-01-01 PROCEDURE — 2709999900 HC NON-CHARGEABLE SUPPLY: Performed by: INTERNAL MEDICINE

## 2020-01-01 PROCEDURE — 0JPT0WZ REMOVAL OF TOTALLY IMPLANTABLE VASCULAR ACCESS DEVICE FROM TRUNK SUBCUTANEOUS TISSUE AND FASCIA, OPEN APPROACH: ICD-10-PCS | Performed by: FAMILY MEDICINE

## 2020-01-01 PROCEDURE — 99153 MOD SED SAME PHYS/QHP EA: CPT | Performed by: RADIOLOGY

## 2020-01-01 PROCEDURE — 76937 US GUIDE VASCULAR ACCESS: CPT | Performed by: RADIOLOGY

## 2020-01-01 PROCEDURE — 83935 ASSAY OF URINE OSMOLALITY: CPT

## 2020-01-01 PROCEDURE — 85027 COMPLETE CBC AUTOMATED: CPT

## 2020-01-01 PROCEDURE — G8926 SPIRO NO PERF OR DOC: HCPCS | Performed by: FAMILY MEDICINE

## 2020-01-01 PROCEDURE — 88173 CYTOPATH EVAL FNA REPORT: CPT

## 2020-01-01 PROCEDURE — 94060 EVALUATION OF WHEEZING: CPT

## 2020-01-01 RX ORDER — HEPARIN SODIUM (PORCINE) LOCK FLUSH IV SOLN 100 UNIT/ML 100 UNIT/ML
500 SOLUTION INTRAVENOUS PRN
Status: DISCONTINUED | OUTPATIENT
Start: 2020-01-01 | End: 2020-01-01 | Stop reason: HOSPADM

## 2020-01-01 RX ORDER — HYDRALAZINE HYDROCHLORIDE 20 MG/ML
10 INJECTION INTRAMUSCULAR; INTRAVENOUS EVERY 6 HOURS PRN
Status: DISCONTINUED | OUTPATIENT
Start: 2020-01-01 | End: 2020-01-01 | Stop reason: HOSPADM

## 2020-01-01 RX ORDER — ZINC OXIDE 216 MG/ML
2 LOTION TOPICAL 2 TIMES DAILY
Status: DISCONTINUED | OUTPATIENT
Start: 2020-01-01 | End: 2020-01-01

## 2020-01-01 RX ORDER — SODIUM CHLORIDE 0.9 % (FLUSH) 0.9 %
5 SYRINGE (ML) INJECTION PRN
Status: CANCELLED | OUTPATIENT
Start: 2020-01-01

## 2020-01-01 RX ORDER — DEXAMETHASONE SODIUM PHOSPHATE 10 MG/ML
10 INJECTION, SOLUTION INTRAMUSCULAR; INTRAVENOUS ONCE
Status: COMPLETED | OUTPATIENT
Start: 2020-01-01 | End: 2020-01-01

## 2020-01-01 RX ORDER — ONDANSETRON 2 MG/ML
4 INJECTION INTRAMUSCULAR; INTRAVENOUS EVERY 6 HOURS PRN
Status: DISCONTINUED | OUTPATIENT
Start: 2020-01-01 | End: 2020-01-01 | Stop reason: HOSPADM

## 2020-01-01 RX ORDER — LOSARTAN POTASSIUM 50 MG/1
50 TABLET ORAL DAILY
Status: DISCONTINUED | OUTPATIENT
Start: 2020-01-01 | End: 2020-01-01 | Stop reason: HOSPADM

## 2020-01-01 RX ORDER — METHYLPREDNISOLONE SODIUM SUCCINATE 125 MG/2ML
125 INJECTION, POWDER, LYOPHILIZED, FOR SOLUTION INTRAMUSCULAR; INTRAVENOUS ONCE
Status: CANCELLED | OUTPATIENT
Start: 2020-01-01

## 2020-01-01 RX ORDER — ZINC OXIDE 216 MG/ML
2 LOTION TOPICAL 2 TIMES DAILY
Qty: 90 CAN | Refills: 0 | Status: ON HOLD
Start: 2020-01-01 | End: 2020-01-01 | Stop reason: HOSPADM

## 2020-01-01 RX ORDER — PREDNISONE 10 MG/1
40 TABLET ORAL DAILY
Qty: 20 TABLET | Refills: 0 | Status: SHIPPED | OUTPATIENT
Start: 2020-01-01 | End: 2020-01-01 | Stop reason: SDUPTHER

## 2020-01-01 RX ORDER — DIPHENHYDRAMINE HYDROCHLORIDE 50 MG/ML
50 INJECTION INTRAMUSCULAR; INTRAVENOUS ONCE
Status: CANCELLED | OUTPATIENT
Start: 2020-01-01

## 2020-01-01 RX ORDER — SODIUM CHLORIDE 9 MG/ML
INJECTION, SOLUTION INTRAVENOUS CONTINUOUS
Status: DISCONTINUED | OUTPATIENT
Start: 2020-01-01 | End: 2020-01-01

## 2020-01-01 RX ORDER — SODIUM CHLORIDE 0.9 % (FLUSH) 0.9 %
10 SYRINGE (ML) INJECTION PRN
Status: DISCONTINUED | OUTPATIENT
Start: 2020-01-01 | End: 2020-01-01 | Stop reason: HOSPADM

## 2020-01-01 RX ORDER — CLOPIDOGREL BISULFATE 75 MG/1
75 TABLET ORAL DAILY
Qty: 90 TABLET | Refills: 1 | Status: SHIPPED | OUTPATIENT
Start: 2020-01-01

## 2020-01-01 RX ORDER — 0.9 % SODIUM CHLORIDE 0.9 %
1000 INTRAVENOUS SOLUTION INTRAVENOUS ONCE
Status: COMPLETED | OUTPATIENT
Start: 2020-01-01 | End: 2020-01-01

## 2020-01-01 RX ORDER — MEPERIDINE HYDROCHLORIDE 50 MG/ML
12.5 INJECTION INTRAMUSCULAR; INTRAVENOUS; SUBCUTANEOUS ONCE
Status: CANCELLED | OUTPATIENT
Start: 2020-01-01

## 2020-01-01 RX ORDER — DIPHENHYDRAMINE HYDROCHLORIDE 50 MG/ML
50 INJECTION INTRAMUSCULAR; INTRAVENOUS ONCE
Status: COMPLETED | OUTPATIENT
Start: 2020-01-01 | End: 2020-01-01

## 2020-01-01 RX ORDER — ATORVASTATIN CALCIUM 80 MG/1
80 TABLET, FILM COATED ORAL DAILY
Status: DISCONTINUED | OUTPATIENT
Start: 2020-01-01 | End: 2020-01-01 | Stop reason: HOSPADM

## 2020-01-01 RX ORDER — TAMSULOSIN HYDROCHLORIDE 0.4 MG/1
0.4 CAPSULE ORAL DAILY
Status: DISCONTINUED | OUTPATIENT
Start: 2020-01-01 | End: 2020-01-01 | Stop reason: HOSPADM

## 2020-01-01 RX ORDER — TRAZODONE HYDROCHLORIDE 50 MG/1
50 TABLET ORAL NIGHTLY
Status: DISCONTINUED | OUTPATIENT
Start: 2020-01-01 | End: 2020-01-01 | Stop reason: HOSPADM

## 2020-01-01 RX ORDER — 0.9 % SODIUM CHLORIDE 0.9 %
80 INTRAVENOUS SOLUTION INTRAVENOUS ONCE
Status: COMPLETED | OUTPATIENT
Start: 2020-01-01 | End: 2020-01-01

## 2020-01-01 RX ORDER — DIPHENHYDRAMINE HYDROCHLORIDE 50 MG/ML
50 INJECTION INTRAMUSCULAR; INTRAVENOUS ONCE
Status: DISCONTINUED | OUTPATIENT
Start: 2020-01-01 | End: 2020-01-01 | Stop reason: HOSPADM

## 2020-01-01 RX ORDER — PALONOSETRON 0.05 MG/ML
0.25 INJECTION, SOLUTION INTRAVENOUS ONCE
Status: COMPLETED | OUTPATIENT
Start: 2020-01-01 | End: 2020-01-01

## 2020-01-01 RX ORDER — PROMETHAZINE HYDROCHLORIDE 25 MG/ML
12.5 INJECTION, SOLUTION INTRAMUSCULAR; INTRAVENOUS ONCE
Status: COMPLETED | OUTPATIENT
Start: 2020-01-01 | End: 2020-01-01

## 2020-01-01 RX ORDER — SODIUM CHLORIDE 9 MG/ML
20 INJECTION, SOLUTION INTRAVENOUS ONCE
Status: CANCELLED | OUTPATIENT
Start: 2020-01-01

## 2020-01-01 RX ORDER — SODIUM CHLORIDE 0.9 % (FLUSH) 0.9 %
10 SYRINGE (ML) INJECTION EVERY 12 HOURS SCHEDULED
Status: DISCONTINUED | OUTPATIENT
Start: 2020-01-01 | End: 2020-01-01 | Stop reason: HOSPADM

## 2020-01-01 RX ORDER — OMEPRAZOLE 20 MG/1
20 CAPSULE, DELAYED RELEASE ORAL DAILY
Qty: 30 CAPSULE | Refills: 3 | Status: SHIPPED | OUTPATIENT
Start: 2020-01-01 | End: 2020-01-01

## 2020-01-01 RX ORDER — ACETAMINOPHEN 325 MG/1
650 TABLET ORAL EVERY 6 HOURS PRN
Status: DISCONTINUED | OUTPATIENT
Start: 2020-01-01 | End: 2020-01-01 | Stop reason: HOSPADM

## 2020-01-01 RX ORDER — ATORVASTATIN CALCIUM 40 MG/1
80 TABLET, FILM COATED ORAL NIGHTLY
Status: DISCONTINUED | OUTPATIENT
Start: 2020-01-01 | End: 2020-01-01 | Stop reason: HOSPADM

## 2020-01-01 RX ORDER — PREDNISONE 10 MG/1
30 TABLET ORAL DAILY
Qty: 18 TABLET | Refills: 0 | Status: SHIPPED | OUTPATIENT
Start: 2020-01-01 | End: 2020-01-01

## 2020-01-01 RX ORDER — LORAZEPAM 2 MG/ML
1 INJECTION INTRAMUSCULAR ONCE
Status: COMPLETED | OUTPATIENT
Start: 2020-01-01 | End: 2020-01-01

## 2020-01-01 RX ORDER — PALONOSETRON 0.05 MG/ML
0.25 INJECTION, SOLUTION INTRAVENOUS ONCE
Status: CANCELLED | OUTPATIENT
Start: 2020-01-01

## 2020-01-01 RX ORDER — SODIUM CHLORIDE 9 MG/ML
20 INJECTION, SOLUTION INTRAVENOUS ONCE
Status: COMPLETED | OUTPATIENT
Start: 2020-01-01 | End: 2020-01-01

## 2020-01-01 RX ORDER — DEXTROMETHORPHAN POLISTIREX 30 MG/5ML
60 SUSPENSION ORAL EVERY 12 HOURS SCHEDULED
Status: DISCONTINUED | OUTPATIENT
Start: 2020-01-01 | End: 2020-01-01 | Stop reason: HOSPADM

## 2020-01-01 RX ORDER — BENZONATATE 100 MG/1
100 CAPSULE ORAL 3 TIMES DAILY PRN
Status: DISCONTINUED | OUTPATIENT
Start: 2020-01-01 | End: 2020-01-01 | Stop reason: HOSPADM

## 2020-01-01 RX ORDER — LIDOCAINE HYDROCHLORIDE 10 MG/ML
INJECTION, SOLUTION EPIDURAL; INFILTRATION; INTRACAUDAL; PERINEURAL PRN
Status: DISCONTINUED | OUTPATIENT
Start: 2020-01-01 | End: 2020-01-01 | Stop reason: SDUPTHER

## 2020-01-01 RX ORDER — EPINEPHRINE 1 MG/ML
0.3 INJECTION, SOLUTION, CONCENTRATE INTRAVENOUS PRN
Status: CANCELLED | OUTPATIENT
Start: 2020-01-01

## 2020-01-01 RX ORDER — LIDOCAINE HYDROCHLORIDE 10 MG/ML
5 INJECTION, SOLUTION EPIDURAL; INFILTRATION; INTRACAUDAL; PERINEURAL ONCE
Status: DISCONTINUED | OUTPATIENT
Start: 2020-01-01 | End: 2020-01-01 | Stop reason: HOSPADM

## 2020-01-01 RX ORDER — PALONOSETRON 0.05 MG/ML
0.25 INJECTION, SOLUTION INTRAVENOUS ONCE
Status: DISCONTINUED | OUTPATIENT
Start: 2020-01-01 | End: 2020-01-01 | Stop reason: HOSPADM

## 2020-01-01 RX ORDER — POLYETHYLENE GLYCOL 3350 17 G/17G
17 POWDER, FOR SOLUTION ORAL DAILY PRN
Status: DISCONTINUED | OUTPATIENT
Start: 2020-01-01 | End: 2020-01-01 | Stop reason: HOSPADM

## 2020-01-01 RX ORDER — ALBUTEROL SULFATE 90 UG/1
2 AEROSOL, METERED RESPIRATORY (INHALATION) 4 TIMES DAILY
Status: DISCONTINUED | OUTPATIENT
Start: 2020-01-01 | End: 2020-01-01

## 2020-01-01 RX ORDER — TAMSULOSIN HYDROCHLORIDE 0.4 MG/1
0.4 CAPSULE ORAL DAILY
Qty: 30 CAPSULE | Refills: 3 | Status: SHIPPED | OUTPATIENT
Start: 2020-01-01

## 2020-01-01 RX ORDER — MAGNESIUM SULFATE 1 G/100ML
1 INJECTION INTRAVENOUS PRN
Status: DISCONTINUED | OUTPATIENT
Start: 2020-01-01 | End: 2020-01-01 | Stop reason: HOSPADM

## 2020-01-01 RX ORDER — SODIUM CHLORIDE 9 MG/ML
INJECTION, SOLUTION INTRAVENOUS CONTINUOUS
Status: CANCELLED | OUTPATIENT
Start: 2020-01-01

## 2020-01-01 RX ORDER — HEPARIN SODIUM (PORCINE) LOCK FLUSH IV SOLN 100 UNIT/ML 100 UNIT/ML
SOLUTION INTRAVENOUS
Status: COMPLETED | OUTPATIENT
Start: 2020-01-01 | End: 2020-01-01

## 2020-01-01 RX ORDER — HEPARIN SODIUM (PORCINE) LOCK FLUSH IV SOLN 100 UNIT/ML 100 UNIT/ML
500 SOLUTION INTRAVENOUS PRN
Status: CANCELLED | OUTPATIENT
Start: 2020-01-01

## 2020-01-01 RX ORDER — HYDROCODONE BITARTRATE AND ACETAMINOPHEN 5; 325 MG/1; MG/1
1 TABLET ORAL EVERY 6 HOURS PRN
Status: DISCONTINUED | OUTPATIENT
Start: 2020-01-01 | End: 2020-01-01 | Stop reason: HOSPADM

## 2020-01-01 RX ORDER — CLOPIDOGREL BISULFATE 75 MG/1
75 TABLET ORAL DAILY
Status: DISCONTINUED | OUTPATIENT
Start: 2020-01-01 | End: 2020-01-01 | Stop reason: HOSPADM

## 2020-01-01 RX ORDER — ALBUTEROL SULFATE 2.5 MG/3ML
2.5 SOLUTION RESPIRATORY (INHALATION)
Status: DISCONTINUED | OUTPATIENT
Start: 2020-01-01 | End: 2020-01-01 | Stop reason: HOSPADM

## 2020-01-01 RX ORDER — PROPOFOL 10 MG/ML
INJECTION, EMULSION INTRAVENOUS CONTINUOUS PRN
Status: DISCONTINUED | OUTPATIENT
Start: 2020-01-01 | End: 2020-01-01 | Stop reason: SDUPTHER

## 2020-01-01 RX ORDER — AMLODIPINE BESYLATE 5 MG/1
10 TABLET ORAL DAILY
Status: DISCONTINUED | OUTPATIENT
Start: 2020-01-01 | End: 2020-01-01 | Stop reason: HOSPADM

## 2020-01-01 RX ORDER — FENTANYL CITRATE 50 UG/ML
INJECTION, SOLUTION INTRAMUSCULAR; INTRAVENOUS
Status: COMPLETED | OUTPATIENT
Start: 2020-01-01 | End: 2020-01-01

## 2020-01-01 RX ORDER — DEXAMETHASONE SODIUM PHOSPHATE 10 MG/ML
10 INJECTION INTRAMUSCULAR; INTRAVENOUS ONCE
Status: DISCONTINUED | OUTPATIENT
Start: 2020-01-01 | End: 2020-01-01 | Stop reason: HOSPADM

## 2020-01-01 RX ORDER — VANCOMYCIN HYDROCHLORIDE 1 G/20ML
INJECTION, POWDER, LYOPHILIZED, FOR SOLUTION INTRAVENOUS
Status: COMPLETED
Start: 2020-01-01 | End: 2020-01-01

## 2020-01-01 RX ORDER — PHENYLEPHRINE HYDROCHLORIDE 10 MG/ML
INJECTION INTRAVENOUS PRN
Status: DISCONTINUED | OUTPATIENT
Start: 2020-01-01 | End: 2020-01-01 | Stop reason: SDUPTHER

## 2020-01-01 RX ORDER — BUPROPION HYDROCHLORIDE 150 MG/1
150 TABLET, EXTENDED RELEASE ORAL DAILY
Status: DISCONTINUED | OUTPATIENT
Start: 2020-01-01 | End: 2020-01-01 | Stop reason: HOSPADM

## 2020-01-01 RX ORDER — SODIUM CHLORIDE 0.9 % (FLUSH) 0.9 %
10 SYRINGE (ML) INJECTION PRN
Status: CANCELLED | OUTPATIENT
Start: 2020-01-01

## 2020-01-01 RX ORDER — GLYCOPYRROLATE 1 MG/5 ML
SYRINGE (ML) INTRAVENOUS PRN
Status: DISCONTINUED | OUTPATIENT
Start: 2020-01-01 | End: 2020-01-01 | Stop reason: SDUPTHER

## 2020-01-01 RX ORDER — METOPROLOL TARTRATE 5 MG/5ML
5 INJECTION INTRAVENOUS EVERY 4 HOURS PRN
Status: DISCONTINUED | OUTPATIENT
Start: 2020-01-01 | End: 2020-01-01

## 2020-01-01 RX ORDER — SODIUM CHLORIDE, SODIUM LACTATE, POTASSIUM CHLORIDE, CALCIUM CHLORIDE 600; 310; 30; 20 MG/100ML; MG/100ML; MG/100ML; MG/100ML
INJECTION, SOLUTION INTRAVENOUS CONTINUOUS
Status: DISCONTINUED | OUTPATIENT
Start: 2020-01-01 | End: 2020-01-01 | Stop reason: HOSPADM

## 2020-01-01 RX ORDER — ALBUTEROL SULFATE 2.5 MG/3ML
2.5 SOLUTION RESPIRATORY (INHALATION) EVERY 4 HOURS PRN
Status: DISCONTINUED | OUTPATIENT
Start: 2020-01-01 | End: 2020-01-01 | Stop reason: HOSPADM

## 2020-01-01 RX ORDER — HYDROCODONE BITARTRATE AND ACETAMINOPHEN 5; 325 MG/1; MG/1
1 TABLET ORAL EVERY 6 HOURS PRN
Qty: 60 TABLET | Refills: 0 | Status: SHIPPED | OUTPATIENT
Start: 2020-01-01 | End: 2020-01-01 | Stop reason: SDUPTHER

## 2020-01-01 RX ORDER — HYDROCODONE BITARTRATE AND ACETAMINOPHEN 5; 325 MG/1; MG/1
1 TABLET ORAL EVERY 6 HOURS PRN
Qty: 120 TABLET | Refills: 0 | Status: ON HOLD | OUTPATIENT
Start: 2020-01-01 | End: 2020-01-01 | Stop reason: HOSPADM

## 2020-01-01 RX ORDER — MEPERIDINE HYDROCHLORIDE 50 MG/ML
12.5 INJECTION INTRAMUSCULAR; INTRAVENOUS; SUBCUTANEOUS EVERY 5 MIN PRN
Status: DISCONTINUED | OUTPATIENT
Start: 2020-01-01 | End: 2020-01-01 | Stop reason: HOSPADM

## 2020-01-01 RX ORDER — ALBUTEROL SULFATE 90 UG/1
2 AEROSOL, METERED RESPIRATORY (INHALATION) EVERY 6 HOURS PRN
Status: DISCONTINUED | OUTPATIENT
Start: 2020-01-01 | End: 2020-01-01

## 2020-01-01 RX ORDER — POTASSIUM CHLORIDE 7.45 MG/ML
10 INJECTION INTRAVENOUS PRN
Status: DISCONTINUED | OUTPATIENT
Start: 2020-01-01 | End: 2020-01-01 | Stop reason: HOSPADM

## 2020-01-01 RX ORDER — LOSARTAN POTASSIUM AND HYDROCHLOROTHIAZIDE 12.5; 1 MG/1; MG/1
1 TABLET ORAL DAILY
Qty: 30 TABLET | Refills: 5 | Status: SHIPPED | OUTPATIENT
Start: 2020-01-01

## 2020-01-01 RX ORDER — VANCOMYCIN HYDROCHLORIDE 1 G/200ML
1000 INJECTION, SOLUTION INTRAVENOUS EVERY 12 HOURS
Status: DISCONTINUED | OUTPATIENT
Start: 2020-01-01 | End: 2020-01-01

## 2020-01-01 RX ORDER — OMEPRAZOLE 20 MG/1
CAPSULE, DELAYED RELEASE ORAL
Qty: 90 CAPSULE | Refills: 0 | Status: SHIPPED | OUTPATIENT
Start: 2020-01-01

## 2020-01-01 RX ORDER — CARVEDILOL 25 MG/1
TABLET ORAL
Qty: 60 TABLET | Refills: 3 | Status: SHIPPED | OUTPATIENT
Start: 2020-01-01

## 2020-01-01 RX ORDER — CARVEDILOL 25 MG/1
25 TABLET ORAL 2 TIMES DAILY WITH MEALS
Status: DISCONTINUED | OUTPATIENT
Start: 2020-01-01 | End: 2020-01-01 | Stop reason: HOSPADM

## 2020-01-01 RX ORDER — BUDESONIDE AND FORMOTEROL FUMARATE DIHYDRATE 160; 4.5 UG/1; UG/1
1 AEROSOL RESPIRATORY (INHALATION) 2 TIMES DAILY
Status: DISCONTINUED | OUTPATIENT
Start: 2020-01-01 | End: 2020-01-01 | Stop reason: HOSPADM

## 2020-01-01 RX ORDER — CALORIC SUPPLEMENT
1 LIQUID (ML) ORAL 3 TIMES DAILY PRN
Qty: 60 BOTTLE | Refills: 5 | Status: SHIPPED | OUTPATIENT
Start: 2020-01-01

## 2020-01-01 RX ORDER — POTASSIUM CHLORIDE 20 MEQ/1
40 TABLET, EXTENDED RELEASE ORAL PRN
Status: DISCONTINUED | OUTPATIENT
Start: 2020-01-01 | End: 2020-01-01 | Stop reason: HOSPADM

## 2020-01-01 RX ORDER — SODIUM CHLORIDE 9 MG/ML
INJECTION, SOLUTION INTRAVENOUS CONTINUOUS
Status: DISCONTINUED | OUTPATIENT
Start: 2020-01-01 | End: 2020-01-01 | Stop reason: HOSPADM

## 2020-01-01 RX ORDER — ALBUTEROL SULFATE 90 UG/1
2 AEROSOL, METERED RESPIRATORY (INHALATION) EVERY 4 HOURS PRN
Status: DISCONTINUED | OUTPATIENT
Start: 2020-01-01 | End: 2020-01-01

## 2020-01-01 RX ORDER — WATER 10 ML/10ML
2.2 INJECTION INTRAMUSCULAR; INTRAVENOUS; SUBCUTANEOUS ONCE
Status: CANCELLED | OUTPATIENT
Start: 2020-01-01

## 2020-01-01 RX ORDER — FENTANYL CITRATE 50 UG/ML
50 INJECTION, SOLUTION INTRAMUSCULAR; INTRAVENOUS EVERY 5 MIN PRN
Status: DISCONTINUED | OUTPATIENT
Start: 2020-01-01 | End: 2020-01-01 | Stop reason: HOSPADM

## 2020-01-01 RX ORDER — ACETAMINOPHEN 650 MG/1
650 SUPPOSITORY RECTAL EVERY 6 HOURS PRN
Status: DISCONTINUED | OUTPATIENT
Start: 2020-01-01 | End: 2020-01-01 | Stop reason: HOSPADM

## 2020-01-01 RX ORDER — ASPIRIN 81 MG/1
81 TABLET ORAL DAILY
Status: DISCONTINUED | OUTPATIENT
Start: 2020-01-01 | End: 2020-01-01

## 2020-01-01 RX ORDER — POTASSIUM CHLORIDE 20 MEQ/1
20 TABLET, EXTENDED RELEASE ORAL DAILY
Qty: 30 TABLET | Refills: 1 | Status: SHIPPED | OUTPATIENT
Start: 2020-01-01

## 2020-01-01 RX ORDER — AMLODIPINE BESYLATE 10 MG/1
10 TABLET ORAL DAILY
Status: DISCONTINUED | OUTPATIENT
Start: 2020-01-01 | End: 2020-01-01 | Stop reason: HOSPADM

## 2020-01-01 RX ORDER — HEPARIN SODIUM 5000 [USP'U]/ML
5000 INJECTION, SOLUTION INTRAVENOUS; SUBCUTANEOUS EVERY 8 HOURS SCHEDULED
Status: DISCONTINUED | OUTPATIENT
Start: 2020-01-01 | End: 2020-01-01 | Stop reason: HOSPADM

## 2020-01-01 RX ORDER — ALBUTEROL SULFATE 2.5 MG/3ML
2.5 SOLUTION RESPIRATORY (INHALATION) 4 TIMES DAILY
Status: DISCONTINUED | OUTPATIENT
Start: 2020-01-01 | End: 2020-01-01 | Stop reason: HOSPADM

## 2020-01-01 RX ORDER — DEXAMETHASONE 4 MG/1
4 TABLET ORAL SEE ADMIN INSTRUCTIONS
Qty: 30 TABLET | Refills: 2 | Status: SHIPPED | OUTPATIENT
Start: 2020-01-01 | End: 2020-01-01

## 2020-01-01 RX ORDER — MIDAZOLAM HYDROCHLORIDE 1 MG/ML
INJECTION INTRAMUSCULAR; INTRAVENOUS PRN
Status: DISCONTINUED | OUTPATIENT
Start: 2020-01-01 | End: 2020-01-01 | Stop reason: SDUPTHER

## 2020-01-01 RX ORDER — FENTANYL CITRATE 50 UG/ML
25 INJECTION, SOLUTION INTRAMUSCULAR; INTRAVENOUS EVERY 5 MIN PRN
Status: DISCONTINUED | OUTPATIENT
Start: 2020-01-01 | End: 2020-01-01 | Stop reason: HOSPADM

## 2020-01-01 RX ORDER — EPHEDRINE SULFATE/0.9% NACL/PF 50 MG/5 ML
SYRINGE (ML) INTRAVENOUS PRN
Status: DISCONTINUED | OUTPATIENT
Start: 2020-01-01 | End: 2020-01-01 | Stop reason: SDUPTHER

## 2020-01-01 RX ORDER — LOSARTAN POTASSIUM AND HYDROCHLOROTHIAZIDE 12.5; 1 MG/1; MG/1
1 TABLET ORAL DAILY
Qty: 30 TABLET | Refills: 5 | Status: SHIPPED | OUTPATIENT
Start: 2020-01-01 | End: 2020-01-01 | Stop reason: SDUPTHER

## 2020-01-01 RX ORDER — PROMETHAZINE HYDROCHLORIDE 25 MG/1
12.5 TABLET ORAL EVERY 6 HOURS PRN
Status: DISCONTINUED | OUTPATIENT
Start: 2020-01-01 | End: 2020-01-01 | Stop reason: HOSPADM

## 2020-01-01 RX ORDER — LIDOCAINE HYDROCHLORIDE 20 MG/ML
SOLUTION OROPHARYNGEAL
Qty: 120 ML | Refills: 0 | Status: SHIPPED | OUTPATIENT
Start: 2020-01-01

## 2020-01-01 RX ORDER — ONDANSETRON 8 MG/1
8 TABLET, ORALLY DISINTEGRATING ORAL 3 TIMES DAILY PRN
Qty: 90 TABLET | Refills: 2 | Status: SHIPPED | OUTPATIENT
Start: 2020-01-01

## 2020-01-01 RX ORDER — MIDAZOLAM HYDROCHLORIDE 1 MG/ML
INJECTION INTRAMUSCULAR; INTRAVENOUS
Status: COMPLETED | OUTPATIENT
Start: 2020-01-01 | End: 2020-01-01

## 2020-01-01 RX ORDER — MIDODRINE HYDROCHLORIDE 10 MG/1
10 TABLET ORAL
Qty: 90 TABLET | Refills: 3 | OUTPATIENT
Start: 2020-01-01

## 2020-01-01 RX ORDER — MIDODRINE HYDROCHLORIDE 5 MG/1
5 TABLET ORAL
Status: DISCONTINUED | OUTPATIENT
Start: 2020-01-01 | End: 2020-01-01

## 2020-01-01 RX ORDER — LIDOCAINE 4 G/G
1 PATCH TOPICAL DAILY
Qty: 30 PATCH | Refills: 0 | Status: SHIPPED | OUTPATIENT
Start: 2020-01-01 | End: 2020-01-01

## 2020-01-01 RX ORDER — AZITHROMYCIN 250 MG/1
TABLET, FILM COATED ORAL
Qty: 6 TABLET | Refills: 0 | Status: SHIPPED | OUTPATIENT
Start: 2020-01-01 | End: 2020-01-01 | Stop reason: ALTCHOICE

## 2020-01-01 RX ORDER — ONDANSETRON 2 MG/ML
4 INJECTION INTRAMUSCULAR; INTRAVENOUS
Status: DISCONTINUED | OUTPATIENT
Start: 2020-01-01 | End: 2020-01-01 | Stop reason: HOSPADM

## 2020-01-01 RX ORDER — NEOSTIGMINE METHYLSULFATE 5 MG/5 ML
SYRINGE (ML) INTRAVENOUS PRN
Status: DISCONTINUED | OUTPATIENT
Start: 2020-01-01 | End: 2020-01-01 | Stop reason: SDUPTHER

## 2020-01-01 RX ORDER — ROCURONIUM BROMIDE 10 MG/ML
INJECTION, SOLUTION INTRAVENOUS PRN
Status: DISCONTINUED | OUTPATIENT
Start: 2020-01-01 | End: 2020-01-01 | Stop reason: SDUPTHER

## 2020-01-01 RX ORDER — LIDOCAINE AND PRILOCAINE 25; 25 MG/G; MG/G
CREAM TOPICAL
Qty: 1 TUBE | Refills: 2 | Status: SHIPPED | OUTPATIENT
Start: 2020-01-01

## 2020-01-01 RX ORDER — CARVEDILOL 12.5 MG/1
25 TABLET ORAL 2 TIMES DAILY WITH MEALS
Status: DISCONTINUED | OUTPATIENT
Start: 2020-01-01 | End: 2020-01-01 | Stop reason: HOSPADM

## 2020-01-01 RX ORDER — ALBUTEROL SULFATE 90 UG/1
2 AEROSOL, METERED RESPIRATORY (INHALATION) EVERY 6 HOURS PRN
Qty: 1 INHALER | Refills: 6 | Status: SHIPPED | OUTPATIENT
Start: 2020-01-01 | End: 2020-01-01

## 2020-01-01 RX ORDER — PANTOPRAZOLE SODIUM 40 MG/1
40 TABLET, DELAYED RELEASE ORAL
Status: DISCONTINUED | OUTPATIENT
Start: 2020-01-01 | End: 2020-01-01 | Stop reason: HOSPADM

## 2020-01-01 RX ORDER — POTASSIUM CHLORIDE 20 MEQ/1
40 TABLET, EXTENDED RELEASE ORAL ONCE
Status: COMPLETED | OUTPATIENT
Start: 2020-01-01 | End: 2020-01-01

## 2020-01-01 RX ORDER — CALORIC SUPPLEMENT
1 LIQUID (ML) ORAL 3 TIMES DAILY PRN
Status: DISCONTINUED | OUTPATIENT
Start: 2020-01-01 | End: 2020-01-01

## 2020-01-01 RX ORDER — LOSARTAN POTASSIUM 50 MG/1
50 TABLET ORAL DAILY
Qty: 30 TABLET | Refills: 5 | Status: SHIPPED | OUTPATIENT
Start: 2020-01-01 | End: 2020-01-01

## 2020-01-01 RX ORDER — HYDROCODONE BITARTRATE AND ACETAMINOPHEN 5; 325 MG/1; MG/1
1 TABLET ORAL EVERY 6 HOURS PRN
Qty: 60 TABLET | Refills: 0 | Status: SHIPPED | OUTPATIENT
Start: 2020-01-01 | End: 2020-01-01

## 2020-01-01 RX ORDER — ASPIRIN 81 MG/1
81 TABLET, CHEWABLE ORAL DAILY
Status: DISCONTINUED | OUTPATIENT
Start: 2020-01-01 | End: 2020-01-01 | Stop reason: HOSPADM

## 2020-01-01 RX ORDER — ASPIRIN 81 MG/1
81 TABLET ORAL DAILY
Status: DISCONTINUED | OUTPATIENT
Start: 2020-01-01 | End: 2020-01-01 | Stop reason: HOSPADM

## 2020-01-01 RX ORDER — SODIUM CHLORIDE 9 MG/ML
20 INJECTION, SOLUTION INTRAVENOUS ONCE
Status: DISCONTINUED | OUTPATIENT
Start: 2020-01-01 | End: 2020-01-01 | Stop reason: HOSPADM

## 2020-01-01 RX ORDER — PROPOFOL 10 MG/ML
INJECTION, EMULSION INTRAVENOUS PRN
Status: DISCONTINUED | OUTPATIENT
Start: 2020-01-01 | End: 2020-01-01 | Stop reason: SDUPTHER

## 2020-01-01 RX ORDER — ATORVASTATIN CALCIUM 80 MG/1
80 TABLET, FILM COATED ORAL DAILY
COMMUNITY
Start: 2019-01-01

## 2020-01-01 RX ORDER — FENTANYL CITRATE 50 UG/ML
INJECTION, SOLUTION INTRAMUSCULAR; INTRAVENOUS PRN
Status: DISCONTINUED | OUTPATIENT
Start: 2020-01-01 | End: 2020-01-01 | Stop reason: SDUPTHER

## 2020-01-01 RX ORDER — SERTRALINE HYDROCHLORIDE 100 MG/1
200 TABLET, FILM COATED ORAL DAILY
Status: DISCONTINUED | OUTPATIENT
Start: 2020-01-01 | End: 2020-01-01 | Stop reason: HOSPADM

## 2020-01-01 RX ORDER — LEVOFLOXACIN 750 MG/1
750 TABLET ORAL EVERY OTHER DAY
Qty: 4 TABLET | Refills: 0 | Status: SHIPPED | OUTPATIENT
Start: 2020-01-01

## 2020-01-01 RX ORDER — CLINDAMYCIN PHOSPHATE 900 MG/50ML
900 INJECTION INTRAVENOUS ONCE
Status: COMPLETED | OUTPATIENT
Start: 2020-01-01 | End: 2020-01-01

## 2020-01-01 RX ORDER — LIDOCAINE HYDROCHLORIDE 20 MG/ML
10 SOLUTION OROPHARYNGEAL
Status: DISCONTINUED | OUTPATIENT
Start: 2020-01-01 | End: 2020-01-01 | Stop reason: HOSPADM

## 2020-01-01 RX ORDER — MIDODRINE HYDROCHLORIDE 5 MG/1
10 TABLET ORAL
Status: DISCONTINUED | OUTPATIENT
Start: 2020-01-01 | End: 2020-01-01 | Stop reason: HOSPADM

## 2020-01-01 RX ORDER — AMLODIPINE BESYLATE 10 MG/1
TABLET ORAL
Qty: 90 TABLET | Refills: 1 | Status: SHIPPED | OUTPATIENT
Start: 2020-01-01

## 2020-01-01 RX ORDER — LIDOCAINE 4 G/G
1 PATCH TOPICAL DAILY
Status: DISCONTINUED | OUTPATIENT
Start: 2020-01-01 | End: 2020-01-01 | Stop reason: HOSPADM

## 2020-01-01 RX ORDER — ONDANSETRON 2 MG/ML
INJECTION INTRAMUSCULAR; INTRAVENOUS PRN
Status: DISCONTINUED | OUTPATIENT
Start: 2020-01-01 | End: 2020-01-01 | Stop reason: SDUPTHER

## 2020-01-01 RX ADMIN — HEPARIN SODIUM 5000 UNITS: 5000 INJECTION INTRAVENOUS; SUBCUTANEOUS at 20:24

## 2020-01-01 RX ADMIN — SODIUM CHLORIDE, PRESERVATIVE FREE 10 ML: 5 INJECTION INTRAVENOUS at 10:13

## 2020-01-01 RX ADMIN — FAMOTIDINE 20 MG: 10 INJECTION, SOLUTION INTRAVENOUS at 09:38

## 2020-01-01 RX ADMIN — POTASSIUM CHLORIDE 40 MEQ: 1500 TABLET, EXTENDED RELEASE ORAL at 01:11

## 2020-01-01 RX ADMIN — ACETAMINOPHEN 650 MG: 325 TABLET ORAL at 22:38

## 2020-01-01 RX ADMIN — PROMETHAZINE HYDROCHLORIDE 12.5 MG: 25 TABLET ORAL at 08:19

## 2020-01-01 RX ADMIN — CLOPIDOGREL 75 MG: 75 TABLET, FILM COATED ORAL at 08:55

## 2020-01-01 RX ADMIN — Medication 60 MG: at 07:33

## 2020-01-01 RX ADMIN — VANCOMYCIN HYDROCHLORIDE 1500 MG: 1 INJECTION, POWDER, LYOPHILIZED, FOR SOLUTION INTRAVENOUS at 21:30

## 2020-01-01 RX ADMIN — FAMOTIDINE 20 MG: 10 INJECTION INTRAVENOUS at 09:31

## 2020-01-01 RX ADMIN — SODIUM CHLORIDE 80 ML: 9 INJECTION, SOLUTION INTRAVENOUS at 09:46

## 2020-01-01 RX ADMIN — TAMSULOSIN HYDROCHLORIDE 0.4 MG: 0.4 CAPSULE ORAL at 07:56

## 2020-01-01 RX ADMIN — ATORVASTATIN CALCIUM 80 MG: 80 TABLET, FILM COATED ORAL at 07:56

## 2020-01-01 RX ADMIN — BUPROPION HYDROCHLORIDE 150 MG: 150 TABLET, FILM COATED, EXTENDED RELEASE ORAL at 07:34

## 2020-01-01 RX ADMIN — CLOPIDOGREL BISULFATE 75 MG: 75 TABLET ORAL at 09:00

## 2020-01-01 RX ADMIN — DIPHENHYDRAMINE HYDROCHLORIDE 50 MG: 50 INJECTION, SOLUTION INTRAMUSCULAR; INTRAVENOUS at 09:25

## 2020-01-01 RX ADMIN — SODIUM CHLORIDE 1000 ML: 9 INJECTION, SOLUTION INTRAVENOUS at 12:39

## 2020-01-01 RX ADMIN — ALBUTEROL SULFATE 2 PUFF: 90 AEROSOL, METERED RESPIRATORY (INHALATION) at 16:56

## 2020-01-01 RX ADMIN — Medication 20 MG: at 08:36

## 2020-01-01 RX ADMIN — ENOXAPARIN SODIUM 40 MG: 40 INJECTION SUBCUTANEOUS at 08:57

## 2020-01-01 RX ADMIN — SERTRALINE 200 MG: 100 TABLET, FILM COATED ORAL at 09:00

## 2020-01-01 RX ADMIN — ASPIRIN 81 MG: 81 TABLET, COATED ORAL at 09:56

## 2020-01-01 RX ADMIN — HEPARIN SODIUM 5000 UNITS: 5000 INJECTION INTRAVENOUS; SUBCUTANEOUS at 13:07

## 2020-01-01 RX ADMIN — TRAZODONE HYDROCHLORIDE 50 MG: 50 TABLET ORAL at 20:20

## 2020-01-01 RX ADMIN — PACLITAXEL 90 MG: 6 INJECTION, SOLUTION INTRAVENOUS at 09:55

## 2020-01-01 RX ADMIN — ALBUTEROL SULFATE 2 PUFF: 90 AEROSOL, METERED RESPIRATORY (INHALATION) at 07:37

## 2020-01-01 RX ADMIN — SERTRALINE 200 MG: 100 TABLET, FILM COATED ORAL at 07:34

## 2020-01-01 RX ADMIN — CARVEDILOL 25 MG: 25 TABLET, FILM COATED ORAL at 17:30

## 2020-01-01 RX ADMIN — ENOXAPARIN SODIUM 40 MG: 40 INJECTION SUBCUTANEOUS at 08:55

## 2020-01-01 RX ADMIN — BUDESONIDE AND FORMOTEROL FUMARATE DIHYDRATE 1 PUFF: 160; 4.5 AEROSOL RESPIRATORY (INHALATION) at 20:32

## 2020-01-01 RX ADMIN — Medication 10 ML: at 12:13

## 2020-01-01 RX ADMIN — ALBUTEROL SULFATE 2 PUFF: 90 AEROSOL, METERED RESPIRATORY (INHALATION) at 20:22

## 2020-01-01 RX ADMIN — HEPARIN SODIUM 5000 UNITS: 5000 INJECTION INTRAVENOUS; SUBCUTANEOUS at 05:38

## 2020-01-01 RX ADMIN — MAGNESIUM HYDROXIDE 30 ML: 400 SUSPENSION ORAL at 12:37

## 2020-01-01 RX ADMIN — DIPHENHYDRAMINE HYDROCHLORIDE 50 MG: 50 INJECTION, SOLUTION INTRAMUSCULAR; INTRAVENOUS at 09:24

## 2020-01-01 RX ADMIN — ALBUTEROL SULFATE 2 PUFF: 90 AEROSOL, METERED RESPIRATORY (INHALATION) at 08:11

## 2020-01-01 RX ADMIN — POTASSIUM CHLORIDE 40 MEQ: 1500 TABLET, EXTENDED RELEASE ORAL at 21:30

## 2020-01-01 RX ADMIN — DEXAMETHASONE SODIUM PHOSPHATE 10 MG: 10 INJECTION INTRAMUSCULAR; INTRAVENOUS at 09:42

## 2020-01-01 RX ADMIN — MIDAZOLAM HYDROCHLORIDE 1 MG: 1 INJECTION, SOLUTION INTRAMUSCULAR; INTRAVENOUS at 08:06

## 2020-01-01 RX ADMIN — HYDROCODONE BITARTRATE AND ACETAMINOPHEN 1 TABLET: 5; 325 TABLET ORAL at 21:11

## 2020-01-01 RX ADMIN — PIPERACILLIN AND TAZOBACTAM 3.38 G: 3; .375 INJECTION, POWDER, FOR SOLUTION INTRAVENOUS at 06:18

## 2020-01-01 RX ADMIN — HYDROCODONE BITARTRATE AND ACETAMINOPHEN 1 TABLET: 5; 325 TABLET ORAL at 13:28

## 2020-01-01 RX ADMIN — CLINDAMYCIN IN 5 PERCENT DEXTROSE 900 MG: 18 INJECTION, SOLUTION INTRAVENOUS at 08:18

## 2020-01-01 RX ADMIN — BUPROPION HYDROCHLORIDE 150 MG: 150 TABLET, FILM COATED, EXTENDED RELEASE ORAL at 09:00

## 2020-01-01 RX ADMIN — TRAZODONE HYDROCHLORIDE 50 MG: 50 TABLET ORAL at 19:57

## 2020-01-01 RX ADMIN — ROCURONIUM BROMIDE 30 MG: 10 INJECTION INTRAVENOUS at 08:11

## 2020-01-01 RX ADMIN — VANCOMYCIN HYDROCHLORIDE 1000 MG: 1 INJECTION, POWDER, LYOPHILIZED, FOR SOLUTION INTRAVENOUS at 02:32

## 2020-01-01 RX ADMIN — Medication 60 MG: at 09:00

## 2020-01-01 RX ADMIN — CLOPIDOGREL 75 MG: 75 TABLET, FILM COATED ORAL at 15:23

## 2020-01-01 RX ADMIN — POTASSIUM CHLORIDE 40 MEQ: 1500 TABLET, EXTENDED RELEASE ORAL at 16:30

## 2020-01-01 RX ADMIN — ATORVASTATIN CALCIUM 80 MG: 40 TABLET, FILM COATED ORAL at 22:09

## 2020-01-01 RX ADMIN — ASPIRIN 81 MG 81 MG: 81 TABLET ORAL at 15:23

## 2020-01-01 RX ADMIN — CEFAZOLIN 1 G: 1 INJECTION, POWDER, FOR SOLUTION INTRAMUSCULAR; INTRAVENOUS at 11:44

## 2020-01-01 RX ADMIN — Medication 10 ML: at 09:22

## 2020-01-01 RX ADMIN — ATORVASTATIN CALCIUM 80 MG: 80 TABLET, FILM COATED ORAL at 09:00

## 2020-01-01 RX ADMIN — TAMSULOSIN HYDROCHLORIDE 0.4 MG: 0.4 CAPSULE ORAL at 07:34

## 2020-01-01 RX ADMIN — SODIUM CHLORIDE, PRESERVATIVE FREE 10 ML: 5 INJECTION INTRAVENOUS at 20:49

## 2020-01-01 RX ADMIN — HEPARIN SODIUM 5000 UNITS: 5000 INJECTION INTRAVENOUS; SUBCUTANEOUS at 14:00

## 2020-01-01 RX ADMIN — PALONOSETRON HYDROCHLORIDE 0.25 MG: 0.25 INJECTION, SOLUTION INTRAVENOUS at 09:40

## 2020-01-01 RX ADMIN — PROPOFOL 150 MG: 10 INJECTION, EMULSION INTRAVENOUS at 08:11

## 2020-01-01 RX ADMIN — CARVEDILOL 25 MG: 25 TABLET, FILM COATED ORAL at 07:34

## 2020-01-01 RX ADMIN — HYDROCODONE BITARTRATE AND ACETAMINOPHEN 1 TABLET: 5; 325 TABLET ORAL at 21:02

## 2020-01-01 RX ADMIN — Medication 10 ML: at 08:25

## 2020-01-01 RX ADMIN — Medication 10 ML: at 08:26

## 2020-01-01 RX ADMIN — PROMETHAZINE HYDROCHLORIDE 12.5 MG: 25 TABLET ORAL at 17:01

## 2020-01-01 RX ADMIN — BUDESONIDE AND FORMOTEROL FUMARATE DIHYDRATE 1 PUFF: 160; 4.5 AEROSOL RESPIRATORY (INHALATION) at 08:11

## 2020-01-01 RX ADMIN — Medication 60 MG: at 19:57

## 2020-01-01 RX ADMIN — TRAZODONE HYDROCHLORIDE 50 MG: 50 TABLET ORAL at 20:22

## 2020-01-01 RX ADMIN — BUDESONIDE AND FORMOTEROL FUMARATE DIHYDRATE 1 PUFF: 160; 4.5 AEROSOL RESPIRATORY (INHALATION) at 07:36

## 2020-01-01 RX ADMIN — DIPHENHYDRAMINE HYDROCHLORIDE 50 MG: 50 INJECTION, SOLUTION INTRAMUSCULAR; INTRAVENOUS at 09:09

## 2020-01-01 RX ADMIN — Medication 60 MG: at 20:22

## 2020-01-01 RX ADMIN — HEPARIN 500 UNITS: 100 SYRINGE at 11:39

## 2020-01-01 RX ADMIN — Medication 500 UNITS: at 12:05

## 2020-01-01 RX ADMIN — VANCOMYCIN HYDROCHLORIDE 1250 MG: 1 INJECTION, POWDER, LYOPHILIZED, FOR SOLUTION INTRAVENOUS at 01:14

## 2020-01-01 RX ADMIN — MIDAZOLAM HYDROCHLORIDE 1 MG: 1 INJECTION, SOLUTION INTRAMUSCULAR; INTRAVENOUS at 08:09

## 2020-01-01 RX ADMIN — HYDROCODONE BITARTRATE AND ACETAMINOPHEN 1 TABLET: 5; 325 TABLET ORAL at 06:08

## 2020-01-01 RX ADMIN — SODIUM CHLORIDE 20 ML/HR: 9 INJECTION, SOLUTION INTRAVENOUS at 09:30

## 2020-01-01 RX ADMIN — Medication 10 ML: at 11:52

## 2020-01-01 RX ADMIN — CARVEDILOL 25 MG: 25 TABLET, FILM COATED ORAL at 16:49

## 2020-01-01 RX ADMIN — PROMETHAZINE HYDROCHLORIDE 12.5 MG: 25 INJECTION INTRAMUSCULAR; INTRAVENOUS at 17:00

## 2020-01-01 RX ADMIN — BUPROPION HYDROCHLORIDE 150 MG: 150 TABLET, FILM COATED, EXTENDED RELEASE ORAL at 08:09

## 2020-01-01 RX ADMIN — POTASSIUM CHLORIDE 40 MEQ: 1500 TABLET, EXTENDED RELEASE ORAL at 17:04

## 2020-01-01 RX ADMIN — Medication 10 ML: at 20:21

## 2020-01-01 RX ADMIN — DEXAMETHASONE SODIUM PHOSPHATE 10 MG: 10 INJECTION INTRAMUSCULAR; INTRAVENOUS at 09:55

## 2020-01-01 RX ADMIN — HYDROCORTISONE SODIUM SUCCINATE 100 MG: 100 INJECTION, POWDER, FOR SOLUTION INTRAMUSCULAR; INTRAVENOUS at 08:18

## 2020-01-01 RX ADMIN — Medication 10 ML: at 08:47

## 2020-01-01 RX ADMIN — IOVERSOL 100 ML: 741 INJECTION INTRA-ARTERIAL; INTRAVENOUS at 17:21

## 2020-01-01 RX ADMIN — PIPERACILLIN AND TAZOBACTAM 3.38 G: 3; .375 INJECTION, POWDER, FOR SOLUTION INTRAVENOUS at 05:24

## 2020-01-01 RX ADMIN — ATORVASTATIN CALCIUM 80 MG: 80 TABLET, FILM COATED ORAL at 08:12

## 2020-01-01 RX ADMIN — HYDROCODONE BITARTRATE AND ACETAMINOPHEN 1 TABLET: 5; 325 TABLET ORAL at 16:38

## 2020-01-01 RX ADMIN — TAMSULOSIN HYDROCHLORIDE 0.4 MG: 0.4 CAPSULE ORAL at 08:12

## 2020-01-01 RX ADMIN — AMLODIPINE BESYLATE 10 MG: 10 TABLET ORAL at 09:00

## 2020-01-01 RX ADMIN — Medication 10 ML: at 08:01

## 2020-01-01 RX ADMIN — PROMETHAZINE HYDROCHLORIDE 12.5 MG: 25 TABLET ORAL at 20:22

## 2020-01-01 RX ADMIN — CEFEPIME 1 G: 1 INJECTION, POWDER, FOR SOLUTION INTRAMUSCULAR; INTRAVENOUS at 17:30

## 2020-01-01 RX ADMIN — HEPARIN SODIUM 5000 UNITS: 5000 INJECTION INTRAVENOUS; SUBCUTANEOUS at 20:21

## 2020-01-01 RX ADMIN — ALBUTEROL SULFATE 2 PUFF: 90 AEROSOL, METERED RESPIRATORY (INHALATION) at 12:09

## 2020-01-01 RX ADMIN — CARBOPLATIN 230 MG: 10 INJECTION INTRAVENOUS at 11:00

## 2020-01-01 RX ADMIN — CARVEDILOL 25 MG: 25 TABLET, FILM COATED ORAL at 17:02

## 2020-01-01 RX ADMIN — HYDROCODONE BITARTRATE AND ACETAMINOPHEN 1 TABLET: 5; 325 TABLET ORAL at 09:56

## 2020-01-01 RX ADMIN — ATORVASTATIN CALCIUM 80 MG: 80 TABLET, FILM COATED ORAL at 07:34

## 2020-01-01 RX ADMIN — SODIUM PHOSPHATE, MONOBASIC, MONOHYDRATE 28.17 MMOL: 276; 142 INJECTION, SOLUTION INTRAVENOUS at 06:27

## 2020-01-01 RX ADMIN — PANTOPRAZOLE SODIUM 40 MG: 40 TABLET, DELAYED RELEASE ORAL at 06:08

## 2020-01-01 RX ADMIN — CARVEDILOL 25 MG: 25 TABLET, FILM COATED ORAL at 08:12

## 2020-01-01 RX ADMIN — Medication 10 ML: at 11:20

## 2020-01-01 RX ADMIN — Medication 10 ML: at 19:57

## 2020-01-01 RX ADMIN — TAMSULOSIN HYDROCHLORIDE 0.4 MG: 0.4 CAPSULE ORAL at 08:09

## 2020-01-01 RX ADMIN — ATORVASTATIN CALCIUM 80 MG: 80 TABLET, FILM COATED ORAL at 08:09

## 2020-01-01 RX ADMIN — PIPERACILLIN AND TAZOBACTAM 3.38 G: 3; .375 INJECTION, POWDER, FOR SOLUTION INTRAVENOUS at 20:24

## 2020-01-01 RX ADMIN — Medication 10 ML: at 08:20

## 2020-01-01 RX ADMIN — CLOPIDOGREL BISULFATE 75 MG: 75 TABLET ORAL at 09:56

## 2020-01-01 RX ADMIN — ALBUTEROL SULFATE 2 PUFF: 90 AEROSOL, METERED RESPIRATORY (INHALATION) at 13:06

## 2020-01-01 RX ADMIN — Medication 10 ML: at 08:30

## 2020-01-01 RX ADMIN — HEPARIN 500 UNITS: 100 SYRINGE at 12:15

## 2020-01-01 RX ADMIN — MAGNESIUM SULFATE HEPTAHYDRATE 1 G: 1 INJECTION, SOLUTION INTRAVENOUS at 20:54

## 2020-01-01 RX ADMIN — HYDROCODONE BITARTRATE AND ACETAMINOPHEN 1 TABLET: 5; 325 TABLET ORAL at 18:37

## 2020-01-01 RX ADMIN — PANTOPRAZOLE SODIUM 40 MG: 40 TABLET, DELAYED RELEASE ORAL at 06:17

## 2020-01-01 RX ADMIN — BUDESONIDE AND FORMOTEROL FUMARATE DIHYDRATE 1 PUFF: 160; 4.5 AEROSOL RESPIRATORY (INHALATION) at 19:57

## 2020-01-01 RX ADMIN — ASPIRIN 81 MG: 81 TABLET, COATED ORAL at 08:09

## 2020-01-01 RX ADMIN — VANCOMYCIN HYDROCHLORIDE 1250 MG: 1.25 INJECTION, POWDER, LYOPHILIZED, FOR SOLUTION INTRAVENOUS at 01:05

## 2020-01-01 RX ADMIN — CLOPIDOGREL BISULFATE 75 MG: 75 TABLET ORAL at 08:12

## 2020-01-01 RX ADMIN — BUDESONIDE AND FORMOTEROL FUMARATE DIHYDRATE 1 PUFF: 160; 4.5 AEROSOL RESPIRATORY (INHALATION) at 08:30

## 2020-01-01 RX ADMIN — PALONOSETRON HYDROCHLORIDE 0.25 MG: 0.25 INJECTION, SOLUTION INTRAVENOUS at 09:23

## 2020-01-01 RX ADMIN — TRAZODONE HYDROCHLORIDE 50 MG: 50 TABLET ORAL at 22:09

## 2020-01-01 RX ADMIN — BUDESONIDE AND FORMOTEROL FUMARATE DIHYDRATE 1 PUFF: 160; 4.5 AEROSOL RESPIRATORY (INHALATION) at 20:19

## 2020-01-01 RX ADMIN — BUDESONIDE AND FORMOTEROL FUMARATE DIHYDRATE 1 PUFF: 160; 4.5 AEROSOL RESPIRATORY (INHALATION) at 21:08

## 2020-01-01 RX ADMIN — ALBUTEROL SULFATE 2 PUFF: 90 AEROSOL, METERED RESPIRATORY (INHALATION) at 19:57

## 2020-01-01 RX ADMIN — CARVEDILOL 25 MG: 25 TABLET, FILM COATED ORAL at 08:09

## 2020-01-01 RX ADMIN — FAMOTIDINE 20 MG: 10 INJECTION INTRAVENOUS at 09:20

## 2020-01-01 RX ADMIN — TAMSULOSIN HYDROCHLORIDE 0.4 MG: 0.4 CAPSULE ORAL at 08:55

## 2020-01-01 RX ADMIN — Medication 10 ML: at 17:22

## 2020-01-01 RX ADMIN — Medication 60 MG: at 20:21

## 2020-01-01 RX ADMIN — Medication 15 MG: at 08:33

## 2020-01-01 RX ADMIN — LORAZEPAM 1 MG: 2 INJECTION INTRAMUSCULAR; INTRAVENOUS at 20:28

## 2020-01-01 RX ADMIN — SODIUM CHLORIDE, PRESERVATIVE FREE 10 ML: 5 INJECTION INTRAVENOUS at 07:50

## 2020-01-01 RX ADMIN — SODIUM CHLORIDE 20 ML/HR: 9 INJECTION, SOLUTION INTRAVENOUS at 09:40

## 2020-01-01 RX ADMIN — HYDROCODONE BITARTRATE AND ACETAMINOPHEN 1 TABLET: 5; 325 TABLET ORAL at 12:37

## 2020-01-01 RX ADMIN — SODIUM CHLORIDE 20 ML/HR: 9 INJECTION, SOLUTION INTRAVENOUS at 09:05

## 2020-01-01 RX ADMIN — BUDESONIDE AND FORMOTEROL FUMARATE DIHYDRATE 1 PUFF: 160; 4.5 AEROSOL RESPIRATORY (INHALATION) at 07:53

## 2020-01-01 RX ADMIN — BUPROPION HYDROCHLORIDE 150 MG: 150 TABLET, FILM COATED, EXTENDED RELEASE ORAL at 08:12

## 2020-01-01 RX ADMIN — DIPHENHYDRAMINE HYDROCHLORIDE 50 MG: 50 INJECTION, SOLUTION INTRAMUSCULAR; INTRAVENOUS at 09:48

## 2020-01-01 RX ADMIN — HEPARIN 500 UNITS: 100 SYRINGE at 11:57

## 2020-01-01 RX ADMIN — ENOXAPARIN SODIUM 40 MG: 40 INJECTION SUBCUTANEOUS at 07:48

## 2020-01-01 RX ADMIN — MIDAZOLAM 1 MG: 1 INJECTION INTRAMUSCULAR; INTRAVENOUS at 11:53

## 2020-01-01 RX ADMIN — MAGNESIUM SULFATE HEPTAHYDRATE 1 G: 1 INJECTION, SOLUTION INTRAVENOUS at 18:07

## 2020-01-01 RX ADMIN — PIPERACILLIN AND TAZOBACTAM 3.38 G: 3; .375 INJECTION, POWDER, LYOPHILIZED, FOR SOLUTION INTRAVENOUS at 03:56

## 2020-01-01 RX ADMIN — DEXAMETHASONE SODIUM PHOSPHATE 10 MG: 10 INJECTION, SOLUTION INTRAMUSCULAR; INTRAVENOUS at 09:22

## 2020-01-01 RX ADMIN — ATORVASTATIN CALCIUM 80 MG: 80 TABLET, FILM COATED ORAL at 08:55

## 2020-01-01 RX ADMIN — Medication 1250 MG: at 22:08

## 2020-01-01 RX ADMIN — SERTRALINE HYDROCHLORIDE 200 MG: 50 TABLET ORAL at 09:56

## 2020-01-01 RX ADMIN — AMLODIPINE BESYLATE 10 MG: 10 TABLET ORAL at 08:12

## 2020-01-01 RX ADMIN — Medication 10 ML: at 11:57

## 2020-01-01 RX ADMIN — ENOXAPARIN SODIUM 40 MG: 40 INJECTION SUBCUTANEOUS at 07:56

## 2020-01-01 RX ADMIN — AMLODIPINE BESYLATE 10 MG: 10 TABLET ORAL at 08:09

## 2020-01-01 RX ADMIN — PACLITAXEL 90 MG: 6 INJECTION, SOLUTION INTRAVENOUS at 09:46

## 2020-01-01 RX ADMIN — CARBOPLATIN 230 MG: 10 INJECTION, SOLUTION INTRAVENOUS at 11:09

## 2020-01-01 RX ADMIN — DIPHENHYDRAMINE HYDROCHLORIDE 50 MG: 50 INJECTION, SOLUTION INTRAMUSCULAR; INTRAVENOUS at 09:34

## 2020-01-01 RX ADMIN — ALBUTEROL SULFATE 2.5 MG: 2.5 SOLUTION RESPIRATORY (INHALATION) at 19:37

## 2020-01-01 RX ADMIN — METOPROLOL TARTRATE 5 MG: 5 INJECTION, SOLUTION INTRAVENOUS at 20:28

## 2020-01-01 RX ADMIN — FAMOTIDINE 20 MG: 10 INJECTION, SOLUTION INTRAVENOUS at 08:38

## 2020-01-01 RX ADMIN — PIPERACILLIN AND TAZOBACTAM 4.5 G: 4; .5 INJECTION, POWDER, LYOPHILIZED, FOR SOLUTION INTRAVENOUS; PARENTERAL at 18:50

## 2020-01-01 RX ADMIN — Medication 10 ML: at 09:46

## 2020-01-01 RX ADMIN — ONDANSETRON 4 MG: 2 INJECTION, SOLUTION INTRAMUSCULAR; INTRAVENOUS at 08:58

## 2020-01-01 RX ADMIN — Medication 60 MG: at 08:12

## 2020-01-01 RX ADMIN — PHENYLEPHRINE HYDROCHLORIDE 50 MCG: 10 INJECTION INTRAVENOUS at 08:44

## 2020-01-01 RX ADMIN — PACLITAXEL 90 MG: 6 INJECTION, SOLUTION INTRAVENOUS at 10:09

## 2020-01-01 RX ADMIN — SODIUM CHLORIDE, POTASSIUM CHLORIDE, SODIUM LACTATE AND CALCIUM CHLORIDE: 600; 310; 30; 20 INJECTION, SOLUTION INTRAVENOUS at 08:06

## 2020-01-01 RX ADMIN — VANCOMYCIN HYDROCHLORIDE 2000 MG: 1 INJECTION, POWDER, LYOPHILIZED, FOR SOLUTION INTRAVENOUS at 03:57

## 2020-01-01 RX ADMIN — BUDESONIDE AND FORMOTEROL FUMARATE DIHYDRATE 1 PUFF: 160; 4.5 AEROSOL RESPIRATORY (INHALATION) at 08:44

## 2020-01-01 RX ADMIN — VANCOMYCIN HYDROCHLORIDE 1250 MG: 1 INJECTION, POWDER, LYOPHILIZED, FOR SOLUTION INTRAVENOUS at 15:59

## 2020-01-01 RX ADMIN — BUDESONIDE AND FORMOTEROL FUMARATE DIHYDRATE 1 PUFF: 160; 4.5 AEROSOL RESPIRATORY (INHALATION) at 19:54

## 2020-01-01 RX ADMIN — PIPERACILLIN AND TAZOBACTAM 3.38 G: 3; .375 INJECTION, POWDER, FOR SOLUTION INTRAVENOUS at 13:04

## 2020-01-01 RX ADMIN — FAMOTIDINE 20 MG: 10 INJECTION, SOLUTION INTRAVENOUS at 09:45

## 2020-01-01 RX ADMIN — TAMSULOSIN HYDROCHLORIDE 0.4 MG: 0.4 CAPSULE ORAL at 09:00

## 2020-01-01 RX ADMIN — SODIUM CHLORIDE: 9 INJECTION, SOLUTION INTRAVENOUS at 11:02

## 2020-01-01 RX ADMIN — ASPIRIN 81 MG 81 MG: 81 TABLET ORAL at 08:55

## 2020-01-01 RX ADMIN — Medication 10 ML: at 11:39

## 2020-01-01 RX ADMIN — DEXAMETHASONE SODIUM PHOSPHATE 10 MG: 10 INJECTION INTRAMUSCULAR; INTRAVENOUS at 09:35

## 2020-01-01 RX ADMIN — Medication 10 ML: at 08:00

## 2020-01-01 RX ADMIN — ASPIRIN 81 MG 81 MG: 81 TABLET ORAL at 07:56

## 2020-01-01 RX ADMIN — ALBUTEROL SULFATE 2.5 MG: 2.5 SOLUTION RESPIRATORY (INHALATION) at 12:05

## 2020-01-01 RX ADMIN — PIPERACILLIN AND TAZOBACTAM 3.38 G: 3; .375 INJECTION, POWDER, FOR SOLUTION INTRAVENOUS at 13:06

## 2020-01-01 RX ADMIN — ATORVASTATIN CALCIUM 80 MG: 80 TABLET, FILM COATED ORAL at 13:29

## 2020-01-01 RX ADMIN — MIDODRINE HYDROCHLORIDE 10 MG: 5 TABLET ORAL at 15:47

## 2020-01-01 RX ADMIN — MIDODRINE HYDROCHLORIDE 5 MG: 5 TABLET ORAL at 12:16

## 2020-01-01 RX ADMIN — ATORVASTATIN CALCIUM 80 MG: 80 TABLET, FILM COATED ORAL at 07:50

## 2020-01-01 RX ADMIN — POTASSIUM CHLORIDE 40 MEQ: 1500 TABLET, EXTENDED RELEASE ORAL at 18:06

## 2020-01-01 RX ADMIN — Medication 10 ML: at 08:19

## 2020-01-01 RX ADMIN — PANTOPRAZOLE SODIUM 40 MG: 40 TABLET, DELAYED RELEASE ORAL at 05:28

## 2020-01-01 RX ADMIN — BUDESONIDE AND FORMOTEROL FUMARATE DIHYDRATE 1 PUFF: 160; 4.5 AEROSOL RESPIRATORY (INHALATION) at 08:13

## 2020-01-01 RX ADMIN — ALBUTEROL SULFATE 2.5 MG: 2.5 SOLUTION RESPIRATORY (INHALATION) at 15:54

## 2020-01-01 RX ADMIN — PALONOSETRON HYDROCHLORIDE 0.25 MG: 0.25 INJECTION, SOLUTION INTRAVENOUS at 09:19

## 2020-01-01 RX ADMIN — PIPERACILLIN AND TAZOBACTAM 3.38 G: 3; .375 INJECTION, POWDER, LYOPHILIZED, FOR SOLUTION INTRAVENOUS at 20:08

## 2020-01-01 RX ADMIN — CEFEPIME 1 G: 1 INJECTION, POWDER, FOR SOLUTION INTRAMUSCULAR; INTRAVENOUS at 05:28

## 2020-01-01 RX ADMIN — AMLODIPINE BESYLATE 10 MG: 10 TABLET ORAL at 07:34

## 2020-01-01 RX ADMIN — PALONOSETRON HYDROCHLORIDE 0.25 MG: 0.25 INJECTION, SOLUTION INTRAVENOUS at 09:33

## 2020-01-01 RX ADMIN — BUDESONIDE AND FORMOTEROL FUMARATE DIHYDRATE 1 PUFF: 160; 4.5 AEROSOL RESPIRATORY (INHALATION) at 20:23

## 2020-01-01 RX ADMIN — PALONOSETRON HYDROCHLORIDE 0.25 MG: 0.25 INJECTION, SOLUTION INTRAVENOUS at 09:08

## 2020-01-01 RX ADMIN — CLOPIDOGREL BISULFATE 75 MG: 75 TABLET ORAL at 07:34

## 2020-01-01 RX ADMIN — Medication 3.5 MG: at 08:56

## 2020-01-01 RX ADMIN — CARBOPLATIN 230 MG: 10 INJECTION INTRAVENOUS at 10:45

## 2020-01-01 RX ADMIN — ALBUTEROL SULFATE 2 PUFF: 90 AEROSOL, METERED RESPIRATORY (INHALATION) at 16:48

## 2020-01-01 RX ADMIN — Medication 10 ML: at 08:10

## 2020-01-01 RX ADMIN — Medication 0.6 MG: at 08:56

## 2020-01-01 RX ADMIN — PROPOFOL 150 MCG/KG/MIN: 10 INJECTION, EMULSION INTRAVENOUS at 08:15

## 2020-01-01 RX ADMIN — Medication 10 ML: at 07:36

## 2020-01-01 RX ADMIN — ACETAMINOPHEN 650 MG: 325 TABLET ORAL at 03:41

## 2020-01-01 RX ADMIN — SODIUM CHLORIDE 1000 ML: 9 INJECTION, SOLUTION INTRAVENOUS at 16:55

## 2020-01-01 RX ADMIN — SODIUM CHLORIDE, PRESERVATIVE FREE 10 ML: 5 INJECTION INTRAVENOUS at 09:07

## 2020-01-01 RX ADMIN — Medication 10 ML: at 22:49

## 2020-01-01 RX ADMIN — Medication 15 MG: at 08:38

## 2020-01-01 RX ADMIN — HYDROCODONE BITARTRATE AND ACETAMINOPHEN 1 TABLET: 5; 325 TABLET ORAL at 02:00

## 2020-01-01 RX ADMIN — PACLITAXEL 90 MG: 6 INJECTION, SOLUTION INTRAVENOUS at 10:06

## 2020-01-01 RX ADMIN — CEFEPIME 1 G: 1 INJECTION, POWDER, FOR SOLUTION INTRAMUSCULAR; INTRAVENOUS at 04:56

## 2020-01-01 RX ADMIN — HEPARIN SODIUM 5000 UNITS: 5000 INJECTION INTRAVENOUS; SUBCUTANEOUS at 05:30

## 2020-01-01 RX ADMIN — ACETAMINOPHEN 650 MG: 325 TABLET ORAL at 20:03

## 2020-01-01 RX ADMIN — SODIUM CHLORIDE, PRESERVATIVE FREE 10 ML: 5 INJECTION INTRAVENOUS at 07:57

## 2020-01-01 RX ADMIN — PHENYLEPHRINE HYDROCHLORIDE 50 MCG: 10 INJECTION INTRAVENOUS at 08:41

## 2020-01-01 RX ADMIN — HEPARIN SODIUM 5000 UNITS: 5000 INJECTION INTRAVENOUS; SUBCUTANEOUS at 21:00

## 2020-01-01 RX ADMIN — PHENYLEPHRINE HYDROCHLORIDE 50 MCG: 10 INJECTION INTRAVENOUS at 08:50

## 2020-01-01 RX ADMIN — DEXAMETHASONE SODIUM PHOSPHATE 10 MG: 10 INJECTION INTRAMUSCULAR; INTRAVENOUS at 09:11

## 2020-01-01 RX ADMIN — SODIUM CHLORIDE 1000 ML: 9 INJECTION, SOLUTION INTRAVENOUS at 18:14

## 2020-01-01 RX ADMIN — BUDESONIDE AND FORMOTEROL FUMARATE DIHYDRATE 1 PUFF: 160; 4.5 AEROSOL RESPIRATORY (INHALATION) at 11:12

## 2020-01-01 RX ADMIN — PACLITAXEL 90 MG: 6 INJECTION, SOLUTION INTRAVENOUS at 10:38

## 2020-01-01 RX ADMIN — SODIUM CHLORIDE, PRESERVATIVE FREE 10 ML: 5 INJECTION INTRAVENOUS at 19:54

## 2020-01-01 RX ADMIN — SODIUM CHLORIDE: 9 INJECTION, SOLUTION INTRAVENOUS at 20:08

## 2020-01-01 RX ADMIN — SODIUM CHLORIDE 20 ML/HR: 9 INJECTION, SOLUTION INTRAVENOUS at 09:11

## 2020-01-01 RX ADMIN — TAMSULOSIN HYDROCHLORIDE 0.4 MG: 0.4 CAPSULE ORAL at 15:23

## 2020-01-01 RX ADMIN — SODIUM CHLORIDE, PRESERVATIVE FREE 10 ML: 5 INJECTION INTRAVENOUS at 08:55

## 2020-01-01 RX ADMIN — Medication 500 UNITS: at 11:52

## 2020-01-01 RX ADMIN — HEPARIN SODIUM 5000 UNITS: 5000 INJECTION INTRAVENOUS; SUBCUTANEOUS at 14:41

## 2020-01-01 RX ADMIN — SERTRALINE 200 MG: 100 TABLET, FILM COATED ORAL at 08:11

## 2020-01-01 RX ADMIN — Medication 60 MG: at 08:14

## 2020-01-01 RX ADMIN — IOVERSOL 100 ML: 741 INJECTION INTRA-ARTERIAL; INTRAVENOUS at 09:45

## 2020-01-01 RX ADMIN — FENTANYL CITRATE 50 MCG: 50 INJECTION INTRAMUSCULAR; INTRAVENOUS at 08:11

## 2020-01-01 RX ADMIN — FAMOTIDINE 20 MG: 10 INJECTION, SOLUTION INTRAVENOUS at 22:49

## 2020-01-01 RX ADMIN — HEPARIN SODIUM 5000 UNITS: 5000 INJECTION INTRAVENOUS; SUBCUTANEOUS at 05:55

## 2020-01-01 RX ADMIN — SERTRALINE 200 MG: 100 TABLET, FILM COATED ORAL at 08:09

## 2020-01-01 RX ADMIN — SODIUM CHLORIDE 20 ML/HR: 9 INJECTION, SOLUTION INTRAVENOUS at 09:23

## 2020-01-01 RX ADMIN — BUDESONIDE AND FORMOTEROL FUMARATE DIHYDRATE 1 PUFF: 160; 4.5 AEROSOL RESPIRATORY (INHALATION) at 21:17

## 2020-01-01 RX ADMIN — HYDROCODONE BITARTRATE AND ACETAMINOPHEN 1 TABLET: 5; 325 TABLET ORAL at 06:56

## 2020-01-01 RX ADMIN — CLOPIDOGREL 75 MG: 75 TABLET, FILM COATED ORAL at 07:56

## 2020-01-01 RX ADMIN — SODIUM CHLORIDE 80 ML: 9 INJECTION, SOLUTION INTRAVENOUS at 17:21

## 2020-01-01 RX ADMIN — AMLODIPINE BESYLATE 10 MG: 5 TABLET ORAL at 09:56

## 2020-01-01 RX ADMIN — HYDROCODONE BITARTRATE AND ACETAMINOPHEN 1 TABLET: 5; 325 TABLET ORAL at 08:37

## 2020-01-01 RX ADMIN — CARBOPLATIN 230 MG: 10 INJECTION INTRAVENOUS at 11:15

## 2020-01-01 RX ADMIN — PANTOPRAZOLE SODIUM 40 MG: 40 TABLET, DELAYED RELEASE ORAL at 05:24

## 2020-01-01 RX ADMIN — CEFEPIME 1 G: 1 INJECTION, POWDER, FOR SOLUTION INTRAMUSCULAR; INTRAVENOUS at 16:49

## 2020-01-01 RX ADMIN — VANCOMYCIN HYDROCHLORIDE 1250 MG: 1 INJECTION, POWDER, LYOPHILIZED, FOR SOLUTION INTRAVENOUS at 10:24

## 2020-01-01 RX ADMIN — CARVEDILOL 25 MG: 12.5 TABLET, FILM COATED ORAL at 09:57

## 2020-01-01 RX ADMIN — Medication 50 MCG: at 11:54

## 2020-01-01 RX ADMIN — LOSARTAN POTASSIUM 50 MG: 50 TABLET, FILM COATED ORAL at 09:56

## 2020-01-01 RX ADMIN — FAMOTIDINE 20 MG: 10 INJECTION, SOLUTION INTRAVENOUS at 09:06

## 2020-01-01 RX ADMIN — HYDROCODONE BITARTRATE AND ACETAMINOPHEN 1 TABLET: 5; 325 TABLET ORAL at 13:29

## 2020-01-01 RX ADMIN — LIDOCAINE HYDROCHLORIDE 50 MG: 10 INJECTION, SOLUTION EPIDURAL; INFILTRATION; INTRACAUDAL; PERINEURAL at 08:11

## 2020-01-01 RX ADMIN — CLOPIDOGREL BISULFATE 75 MG: 75 TABLET ORAL at 08:08

## 2020-01-01 RX ADMIN — CARBOPLATIN 230 MG: 10 INJECTION, SOLUTION INTRAVENOUS at 11:37

## 2020-01-01 RX ADMIN — Medication 10 ML: at 08:27

## 2020-01-01 RX ADMIN — ALBUTEROL SULFATE 2.5 MG: 2.5 SOLUTION RESPIRATORY (INHALATION) at 08:58

## 2020-01-01 RX ADMIN — VANCOMYCIN HYDROCHLORIDE 1250 MG: 1 INJECTION, POWDER, LYOPHILIZED, FOR SOLUTION INTRAVENOUS at 22:32

## 2020-01-01 RX ADMIN — HEPARIN 500 UNITS: 100 SYRINGE at 11:20

## 2020-01-01 RX ADMIN — SODIUM CHLORIDE, PRESERVATIVE FREE 10 ML: 5 INJECTION INTRAVENOUS at 08:58

## 2020-01-01 RX ADMIN — Medication 10 ML: at 14:26

## 2020-01-01 RX ADMIN — BUPROPION HYDROCHLORIDE 150 MG: 150 TABLET, FILM COATED, EXTENDED RELEASE ORAL at 09:56

## 2020-01-01 RX ADMIN — POTASSIUM CHLORIDE 40 MEQ: 1500 TABLET, EXTENDED RELEASE ORAL at 05:08

## 2020-01-01 ASSESSMENT — PULMONARY FUNCTION TESTS
PIF_VALUE: 27
PIF_VALUE: 27
PIF_VALUE: 1
PIF_VALUE: 32
PIF_VALUE: 25
PIF_VALUE: 27
PIF_VALUE: 17
PIF_VALUE: 34
PIF_VALUE: 21
PIF_VALUE: 28
PIF_VALUE: 22
PIF_VALUE: 26
PIF_VALUE: 24
PIF_VALUE: 29
PIF_VALUE: 19
PIF_VALUE: 1
PIF_VALUE: 8
PIF_VALUE: 14
PIF_VALUE: 22
PIF_VALUE: 22
PIF_VALUE: 27
PIF_VALUE: 32
PIF_VALUE: 22
PIF_VALUE: 9
PIF_VALUE: 38
PIF_VALUE: 26
PIF_VALUE: 23
PIF_VALUE: 8
PIF_VALUE: 25
PIF_VALUE: 2
PIF_VALUE: 20
PIF_VALUE: 20
PIF_VALUE: 21
PIF_VALUE: 23
PIF_VALUE: 30
PIF_VALUE: 17
PIF_VALUE: 26
PIF_VALUE: 20
PIF_VALUE: 27
PIF_VALUE: 24
PIF_VALUE: 24
PIF_VALUE: 16
PIF_VALUE: 33
PIF_VALUE: 32
PIF_VALUE: 0
PIF_VALUE: 7
PIF_VALUE: 23
PIF_VALUE: 29
PIF_VALUE: 1
PIF_VALUE: 1
PIF_VALUE: 31
PIF_VALUE: 19
PIF_VALUE: 15
PIF_VALUE: 34
PIF_VALUE: 28
PIF_VALUE: 9
PIF_VALUE: 23
PIF_VALUE: 27
PIF_VALUE: 30

## 2020-01-01 ASSESSMENT — ENCOUNTER SYMPTOMS
NAUSEA: 1
CHEST TIGHTNESS: 0
NAUSEA: 0
SORE THROAT: 0
COUGH: 1
BACK PAIN: 1
VOMITING: 1
ABDOMINAL PAIN: 0
ABDOMINAL PAIN: 1
VOMITING: 0
COUGH: 1
DIARRHEA: 0
COUGH: 0
SHORTNESS OF BREATH: 1
NAUSEA: 1
ABDOMINAL DISTENTION: 0
VOMITING: 0
SHORTNESS OF BREATH: 1
SHORTNESS OF BREATH: 0
SHORTNESS OF BREATH: 1
CONSTIPATION: 0
EYES NEGATIVE: 1
DIARRHEA: 0
NAUSEA: 0
CONSTIPATION: 1
WHEEZING: 0
SHORTNESS OF BREATH: 1
VOMITING: 0

## 2020-01-01 ASSESSMENT — PAIN DESCRIPTION - LOCATION
LOCATION: CHEST
LOCATION: CHEST
LOCATION: BREAST;ABDOMEN
LOCATION: BACK;LEG
LOCATION: BACK
LOCATION: CHEST
LOCATION: BACK
LOCATION: ABDOMEN
LOCATION: BACK
LOCATION: CHEST
LOCATION: BACK;LEG

## 2020-01-01 ASSESSMENT — PAIN DESCRIPTION - DIRECTION
RADIATING_TOWARDS: B FEET
RADIATING_TOWARDS: B FEET

## 2020-01-01 ASSESSMENT — PAIN DESCRIPTION - PAIN TYPE
TYPE: ACUTE PAIN
TYPE: CHRONIC PAIN
TYPE: ACUTE PAIN
TYPE: CHRONIC PAIN

## 2020-01-01 ASSESSMENT — PAIN DESCRIPTION - ORIENTATION
ORIENTATION: RIGHT;LEFT
ORIENTATION: RIGHT;LEFT
ORIENTATION: RIGHT;LOWER
ORIENTATION: RIGHT;LEFT

## 2020-01-01 ASSESSMENT — PAIN DESCRIPTION - FREQUENCY
FREQUENCY: CONTINUOUS

## 2020-01-01 ASSESSMENT — PAIN SCALES - GENERAL
PAINLEVEL_OUTOF10: 4
PAINLEVEL_OUTOF10: 9
PAINLEVEL_OUTOF10: 5
PAINLEVEL_OUTOF10: 5
PAINLEVEL_OUTOF10: 6
PAINLEVEL_OUTOF10: 6
PAINLEVEL_OUTOF10: 8
PAINLEVEL_OUTOF10: 0
PAINLEVEL_OUTOF10: 4
PAINLEVEL_OUTOF10: 0
PAINLEVEL_OUTOF10: 0
PAINLEVEL_OUTOF10: 5
PAINLEVEL_OUTOF10: 7
PAINLEVEL_OUTOF10: 5
PAINLEVEL_OUTOF10: 0
PAINLEVEL_OUTOF10: 7
PAINLEVEL_OUTOF10: 0
PAINLEVEL_OUTOF10: 9
PAINLEVEL_OUTOF10: 0
PAINLEVEL_OUTOF10: 0
PAINLEVEL_OUTOF10: 5
PAINLEVEL_OUTOF10: 4
PAINLEVEL_OUTOF10: 0
PAINLEVEL_OUTOF10: 5
PAINLEVEL_OUTOF10: 0
PAINLEVEL_OUTOF10: 2
PAINLEVEL_OUTOF10: 6
PAINLEVEL_OUTOF10: 6
PAINLEVEL_OUTOF10: 4
PAINLEVEL_OUTOF10: 8
PAINLEVEL_OUTOF10: 6
PAINLEVEL_OUTOF10: 5
PAINLEVEL_OUTOF10: 0
PAINLEVEL_OUTOF10: 0
PAINLEVEL_OUTOF10: 9
PAINLEVEL_OUTOF10: 6
PAINLEVEL_OUTOF10: 8
PAINLEVEL_OUTOF10: 0
PAINLEVEL_OUTOF10: 0
PAINLEVEL_OUTOF10: 5
PAINLEVEL_OUTOF10: 0
PAINLEVEL_OUTOF10: 6
PAINLEVEL_OUTOF10: 5
PAINLEVEL_OUTOF10: 7
PAINLEVEL_OUTOF10: 9
PAINLEVEL_OUTOF10: 6
PAINLEVEL_OUTOF10: 0
PAINLEVEL_OUTOF10: 9
PAINLEVEL_OUTOF10: 0
PAINLEVEL_OUTOF10: 0
PAINLEVEL_OUTOF10: 6
PAINLEVEL_OUTOF10: 0
PAINLEVEL_OUTOF10: 4
PAINLEVEL_OUTOF10: 0
PAINLEVEL_OUTOF10: 7

## 2020-01-01 ASSESSMENT — PAIN - FUNCTIONAL ASSESSMENT
PAIN_FUNCTIONAL_ASSESSMENT: ACTIVITIES ARE NOT PREVENTED
PAIN_FUNCTIONAL_ASSESSMENT: PREVENTS OR INTERFERES SOME ACTIVE ACTIVITIES AND ADLS
PAIN_FUNCTIONAL_ASSESSMENT: 0-10
PAIN_FUNCTIONAL_ASSESSMENT: 0-10

## 2020-01-01 ASSESSMENT — PAIN DESCRIPTION - DESCRIPTORS
DESCRIPTORS: ACHING
DESCRIPTORS: ACHING;DISCOMFORT
DESCRIPTORS: ACHING
DESCRIPTORS: ACHING;BURNING
DESCRIPTORS: TIGHTNESS
DESCRIPTORS: ACHING

## 2020-01-01 ASSESSMENT — PAIN DESCRIPTION - PROGRESSION
CLINICAL_PROGRESSION: NOT CHANGED
CLINICAL_PROGRESSION: GRADUALLY IMPROVING
CLINICAL_PROGRESSION: GRADUALLY WORSENING
CLINICAL_PROGRESSION: NOT CHANGED

## 2020-01-01 ASSESSMENT — PAIN DESCRIPTION - ONSET
ONSET: SUDDEN
ONSET: ON-GOING
ONSET: ON-GOING

## 2020-01-06 NOTE — TELEPHONE ENCOUNTER
Name: Estefany Elena  : 1955  MRN: [id number]    Oncology Navigation Follow-Up Note    Contact Type:  Telephone  Notes: Pt called in stating new lump noted \"sticking out from my collar bone\". Pt stated lump on right side & inquired if \"it needs to be biopsied when they do the other biopsy\". Instructed pt Dr. Josette Steele will complete bx by EBUS & will not bx collar bone. Instructed pt important to completed EBUS w/bx asap & will update Dr. Lenka Hernandez on new lump. Pt verbalized understanding & thanked writer. Spoke with Dr. Lenka Hernandez via telephone, updated on conversation with pt. Will continue to follow.     Electronically signed by Zulema Smalls RN on 2020 at 2:42 PM

## 2020-01-06 NOTE — PROGRESS NOTES
[facility]  [facility] [address]  [address] [address]  Dept: [phone]  Dept Fax: [phone]    Ana Harper is a 75 y.o. female who presents today for her medical conditions/complaints as noted below. Ana Harper is c/o of  Chief Complaint   Patient presents with    3 Month Follow-Up         HPI:     HPI       Pt is a 76 y/o here for 3 month follow up. Pt had lump below R ear which was biopsied by ENT and back as b-cell follicular lymphoma. Has also seen oncology as well and had PET scan and found to have lung lesions as well. Will be seeing pulmonology for biopsy. Did note recently a new bump on her R collar bone. Not painful. Depression stable- bought a  light to help as well. Sees psychiatrist regularly. no suicidal/ homicidal ideation. Copd- stable, no SOB, chest pain.      Hemoglobin A1C (%)   Date Value   01/06/2020 5.4             ( goal A1C is < 7)   No results found for: LABMICR  LDL Cholesterol (mg/dL)   Date Value   09/17/2019 139 (H)     LDL Calculated (mg/dL)   Date Value   05/16/2018 136       (goal LDL is <100)   AST (U/L)   Date Value   12/13/2019 27     ALT (U/L)   Date Value   12/13/2019 32     BUN (mg/dL)   Date Value   12/13/2019 11     BP Readings from Last 3 Encounters:   01/06/20 132/88   12/20/19 137/78   12/20/19 137/78          (goal 120/80)    Past Medical History:   Diagnosis Date    CAD (coronary artery disease)     fatty pockets in the atrium    Cancer ([facility] [address] 43/1327    follicular lymphoma    Chronic back pain     \"pinched columns\", smokes marijuana prn for back pain    COPD (chronic obstructive pulmonary disease) (HCC)     Depression     Hx of blood clots     Hyperlipidemia     Hypertension     Pelvic adhesions     very bad, had hyst with BSO    Pelvic pain in female     Hx, had hyst      Past Surgical History:   Procedure Laterality Date    ANKLE SURGERY Right     broken ankle with hardware inserted and later removed    APPENDECTOMY      BONE MARROW BIOPSY Left 2019    pelvic, benign    CARDIAC SURGERY      cardiac cath, no stents    FINE NEEDLE ASPIRATION Right 2019    lymph nodes, cancer found, follicular    HYSTERECTOMY, TOTAL ABDOMINAL  1982    with BSO    OTHER SURGICAL HISTORY      blood clot extraction from under bladder, angiogram to remove bloot clot and inserted \"something to keep the vein open\" ?stent?  SHOULDER SURGERY Right     rotator cuff    TONSILLECTOMY         Family History   Problem Relation Age of Onset    Heart Disease Mother     High Blood Pressure Mother     Cancer Mother         cervix    Heart Disease Father     High Blood Pressure Father     Stroke Father     Cancer Sister         cervix    Stroke Sister     Cancer Paternal Grandfather         lung       Social History     Tobacco Use    Smoking status: Former Smoker     Packs/day: 0.25     Years: 55.00     Pack years: 13.75     Types: Cigarettes     Start date: 1963     Last attempt to quit: 2019     Years since quittin.0    Smokeless tobacco: Never Used   Substance Use Topics    Alcohol use: No      Current Outpatient Medications   Medication Sig Dispense Refill    atorvastatin (LIPITOR) 80 MG tablet       HYDROcodone-acetaminophen (NORCO) 5-325 MG per tablet Take 1 tablet by mouth every 8 hours as needed for Pain for up to 30 days.  60 tablet 0    carvedilol (COREG) 25 MG tablet TAKE ONE TABLET BY MOUTH TWICE A DAY WITH MEALS 60 tablet 3    SYMBICORT 160-4.5 MCG/ACT AERO INHALE 1 PUFF BY MOUTH AS DIRECTED TWO TIMES A DAY 10.2 g 2    traZODone (DESYREL) 50 MG tablet TAKE ONE TABLET BY MOUTH DAILY AT BEDTIME 30 tablet 5    sertraline (ZOLOFT) 100 MG tablet Take 1.5 tablets by mouth daily 30 tablet 5    clopidogrel (PLAVIX) 75 MG tablet Take 75 mg by mouth daily      aspirin 81 MG tablet Take 81 mg by mouth daily      amLODIPine (NORVASC) 10 MG tablet TAKE ONE TABLET BY MOUTH EVERY DAY 90 tablet 1    buPROPion (WELLBUTRIN SR) 150 MG extended release tablet Take 1 tablet by mouth daily 60 tablet 3     No current facility-administered medications for this visit. No Known Allergies    Health Maintenance   Topic Date Due    DTaP/Tdap/Td vaccine (1 - Tdap) 01/25/1964    Diabetes screen  01/25/1993    Shingles Vaccine (1 of 2) 01/25/2003    Colon Cancer Screen FIT/FOBT  07/17/2020    Annual Wellness Visit (AWV)  09/03/2020    Lipid screen  09/17/2020    Potassium monitoring  12/13/2020    Creatinine monitoring  12/13/2020    Breast cancer screen  02/26/2021    DEXA (modify frequency per FRAX score)  Completed    Flu vaccine  Completed    Pneumococcal 65+ years Vaccine  Completed    Hepatitis C screen  Completed       Subjective:      Review of Systems   Constitutional: Negative for chills and fever. Respiratory: Negative for chest tightness, shortness of breath and wheezing. Cardiovascular: Negative for chest pain. Gastrointestinal: Negative for nausea and vomiting. Musculoskeletal:        Bump on R collar bone   Skin: Negative for rash. Psychiatric/Behavioral: Negative for dysphoric mood and suicidal ideas. Objective:     Physical Exam  Constitutional:       Appearance: She is well-developed. HENT:      Head: Normocephalic and atraumatic. Mouth/Throat:      Mouth: Mucous membranes are moist.      Pharynx: Oropharynx is clear. No oropharyngeal exudate. Eyes:      Conjunctiva/sclera: Conjunctivae normal.      Pupils: Pupils are equal, round, and reactive to light. Neck:      Musculoskeletal: Normal range of motion and neck supple. Cardiovascular:      Rate and Rhythm: Normal rate and regular rhythm. Heart sounds: Normal heart sounds. Pulmonary:      Effort: Pulmonary effort is normal.      Breath sounds: Normal breath sounds.    Musculoskeletal:      Comments: Soft movable lump on R clavicle    Skin:     General: Skin is warm and dry.   Neurological:      Mental Status: She is alert and oriented to person, place, and time. Psychiatric:         Behavior: Behavior normal.         Thought Content: Thought content normal.       /88 (Site: Left Upper Arm, Position: Sitting, Cuff Size: Large Adult)   Pulse 72   Resp 18   Ht 5' 4.5\" (1.638 m)   Wt 180 lb 9.6 oz (81.9 kg)   SpO2 93%   Breastfeeding? No   BMI 30.52 kg/m²     Assessment:      1. Diffuse follicle center lymphoma of lymph nodes of neck ([facility] [address]  - following with oncology, will be getting biopsy of lung lesions . Has new bump on R clavicle, discussed could possibly be lipoma, but to let her oncologist know as well. 2. Depression, unspecified depression type  - controlled, continue to follow with psychiatry and current medication. 3. Impaired fasting glucose  - a1c normal at 5.4  - POCT glycosylated hemoglobin (Hb A1C)    4. Chronic obstructive pulmonary disease, unspecified COPD type ([facility] [address]  - stable, continue current medications. Plan:      Return in about 3 months (around 4/6/2020) for follow up. Orders Placed This Encounter   Procedures    POCT glycosylated hemoglobin (Hb A1C)     No orders of the defined types were placed in this encounter.            Electronically signed by Odalis Everett DO on 1/6/2020 at 8:41 PM

## 2020-01-09 NOTE — PATIENT INSTRUCTIONS
Please stop aspirin and plavix on 1/10/2020 for bronchoscopy and ebus on 1/17/2020. Electronically signed by Mary Hernandes MD on 1/9/2020 at 1:30 PM     EBUS and Bronch, in OR under General without flouro, with Pathology.  On 1/17/2020, at 8:00am at [facility] at 6:00am. Simon Bradley.

## 2020-01-09 NOTE — PROGRESS NOTES
 COPD (chronic obstructive pulmonary disease) (HCC)     Depression     Hx of blood clots     Hyperlipidemia     Hypertension     Pelvic adhesions     very bad, had hyst with BSO    Pelvic pain in female     Hx, had hyst         Family History:       Problem Relation Age of Onset    Heart Disease Mother     High Blood Pressure Mother     Cancer Mother         cervix    Heart Disease Father     High Blood Pressure Father     Stroke Father     Cancer Sister         cervix    Stroke Sister     Cancer Paternal Grandfather         lung       SURGICAL HISTORY:   Past Surgical History:   Procedure Laterality Date    ANKLE SURGERY Right     broken ankle with hardware inserted and later removed    APPENDECTOMY      BONE MARROW BIOPSY Left 12/05/2019    pelvic, benign    CARDIAC SURGERY      cardiac cath, no stents    FINE NEEDLE ASPIRATION Right 11/2019    lymph nodes, cancer found, follicular    HYSTERECTOMY, TOTAL ABDOMINAL  1982    with BSO    OTHER SURGICAL HISTORY      blood clot extraction from under bladder, angiogram to remove bloot clot and inserted \"something to keep the vein open\" ?stent?  SHOULDER SURGERY Right     rotator cuff    TONSILLECTOMY             SOCIAL AND OCCUPATIONAL HEALTH:      There  no history of TB or TB exposure. There  no asbestos or silica dust exposure. The patient reports  no coal, foundry, quarry or [employer] exposure. Travel history reveals no. There is prn marijuana presently   There  no hot tube exposure. Pets  Cats  No birds     Occupational history -  or cook     TOBACCO:   reports that she quit smoking about a year ago. Her smoking use included cigarettes. She started smoking about 56 years ago. She has a 13.75 pack-year smoking history. She has never used smokeless tobacco.  ETOH:   reports no history of alcohol use.       ALLERGIES:      No Known Allergies      Home Meds:   Prior to Admission medications    Medication Sig Start and hesitancy  INTEGUMENT  negative for rash, skin lesion(s), dryness, skin color change, changes in lesion, pruritus and changes in hair  HEMATOLOGIC/LYMPHATIC:  negative for easy bruising, bleeding, lymphadenopathy, petechiae and swelling/edema  ALLERGIC/IMMUNOLOGIC:  negative for recurrent infections, urticaria and drug reactions  ENDOCRINE:  negative for heat intolerance, cold intolerance, tremor, weight changes and change in bowel habits  MUSCULOSKELETAL:  negative for  myalgias, arthralgias, pain, joint swelling, stiff joints and decreased range of motion  NEUROLOGICAL:  negative for headaches, dizziness, seizures, memory problems, speech problems, visual disturbance and coordination problems  BEHAVIOR/PSYCH:  negative for poor appetite, increased appetite, decreased sleep, increased sleep, decreased energy level, increased energy level and poor concentration  Skin no rash no dermatitis  Vitals:  BP (!) 145/78   Pulse 70   Temp 98 °F (36.7 °C) (Oral)   Resp 14   Ht 5' 4.5\" (1.638 m)   Wt 173 lb 6.4 oz (78.7 kg)   SpO2 93% Comment: room air  BMI 29.30 kg/m²     PHYSICAL EXAM:  General Appearance:    Alert, cooperative, no distress, appears stated age   Head:    Normocephalic, without obvious abnormality, atraumatic      Eyes:    PERRL, conjunctiva/corneas clear, EOM's intact   Ears:    Normal  external ear canals, both ears   Nose:   Nares normal, septum midline, mucosa normal, no drainage        or sinus tenderness   Throat:   Lips, mucosa, and tongue normal; teeth and gums normal   Neck:   Supple, symmetrical, trachea midline, no adenopathy;     thyroid:  no enlargement/tenderness/nodules; no carotid    bruit , noJVD   Back:     Symmetric, no curvature, ROM normal, no CVA tenderness   Lungs:     AP diameter of chest increased. Thoracic expansion and diaphragmatic excursion diminished. BS diminished and expiratory phase prolonged. No dullness to percussion or tenderness to palpation.  No bronchial breath sounds . Chest Wall:    No tenderness or deformity      Heart:    Regular rate and rhythm, S1 and S2 normal, no murmur, rub        or gallop no rvh                           Abdomen:                                                 Pulses:                              Skin:                  Lymph nodes:                    Neurologic:                  Soft, non-tender, bowel sounds active all four quadrants,     no masses, no organomegaly         2+ and symmetric all extremities     Skin color, texture, turgor normal, no rashes or lesions       Cervical, supraclavicular not enlarged or matted or tender      CNII-XII intact     Clubbing No  Lower ext edema no  Upper ext edema no                    IMPRESSION:     Diagnosis Orders   1. Mediastinal lymphadenopathy     2. Adenocarcinoma of left lung ([facility] [address]     3. [facility], [facility] to be determined ([facility]  albuterol sulfate  (90 Base) MCG/ACT inhaler    Bronchoscopy    Full PFT Study With Bronchodilator   4. Chronic respiratory failure with hypoxia (HCC)          :                PLAN:      We will proceed with endobronchial ultrasound guided transbronchial needle aspiration of mediastinal lymph nodes for staging purposes. Scheduled for 1/17/2020 at [place] I have asked her to stop taking aspirin and Plavix 7 days prior to procedure. PFT which has been completed and I reviewed the results showed stage IV COPD with FEV1 23% predicted  While doing PFT patient underwent oxygen evaluation because her saturations were below 88%. It was noted that patient saturation at rest was 86% and she was started on 2 L oxygen via nasal cannula. Patient Tutu Thompson needs portable oxygen concentrator to avoid adverse medical outcome .   Follow-up after biopsy  Requested Prescriptions     Signed Prescriptions Disp Refills    albuterol sulfate  (90 Base) MCG/ACT inhaler 1 Inhaler 6     Sig: Inhale 2 puffs into the lungs every 6 hours as needed for Wheezing

## 2020-01-10 NOTE — PROGRESS NOTES
Home Oxygen Evaluation    Home Oxygen Evaluation completed. Patient is on 2 liters per minute via nasal cannula.   Resting SpO2 = 86%  Resting SpO2 on room air = 94%    Serina Aceves  2:10 PM2

## 2020-01-17 NOTE — TELEPHONE ENCOUNTER
WHILE DOING CHART PREP FOR 1/22/2020 NOTICED THAT PATIENT'S EBUS WAS CANCELLED FOR 1/17/2020 DUE TO PATIENT HAVING A COUGH. I REVIEWED TELEPHONE ENCOUNTER FROM PULMONOLOGIST OFFICE WITH Gildardo Hernandez RN NAVIGATOR AND DR. ARELLANO.  DR. Erna Beasley WANTS TO POSTPONE FOLLOW UP UNTIL AFTER EBUS IS RESCHEDULED. I CALLED AND NOTIFIED PATIENT AND SHE WILL CALL US TO RESCHEDULE ONCE SHE HEARS FROM PULMONOLOGY. JUDI AND DR. ARELLANO UPDATED.

## 2020-01-20 NOTE — TELEPHONE ENCOUNTER
I am sending a prescription of prednisone and antibiotic for her please schedule an appointment for her this week in [place] for follow-up

## 2020-01-20 NOTE — TELEPHONE ENCOUNTER
The pharmacy called for an ABX I didn't see a prescription or note I called the patient to see if she needs an ABX the patient was coughing hard and sound very trouble, SOB she stated that she is coughing up yellow thick mucus.  Please advise

## 2020-01-23 NOTE — TELEPHONE ENCOUNTER
Name: Francia Correa  : 1955  MRN: [id number]    Oncology Navigation Follow-Up Note  Notes: Met pt @ P specialty office for Dr. Phil Mckoy f/u. Dr. Phil Mckoy spoke with pt @ length r/t recent PFT's showing stage 4 COPD & high risk for EBUS w/bx. Dr. Phil Mckoy explained in detail possible complications r/t high risk. Pt stated would like to proceed with bx. Dr. Jean Schmidt instructed pt to complete zithromax & current prednisone prescription. Dr. Phil Mckoy stated starting 2020 pt to start new prednisone prescription & not to take day of procedure. Dr. Phil Mckoy stated will schedule EBUS w/bx 2020. Pt verbalized understanding. Instructed pt writer will update Dr. Zambrano Service Dr. Louisa Swain. Pt requested writer speak with Dr. Remi Marie r/t refill on norco.  Dr. Louisa Swain updated on pt & notified Fan Mosher will speak with Dr. Remi Marie upon arrival to [facility] tomorrow. Will continue to follow.     Electronically signed by Renetta Mars RN on 2020 at 5:21 PM

## 2020-01-23 NOTE — PROGRESS NOTES
(Fluad 65 yrs and older) 10/02/2019    Pneumococcal Conjugate 13-valent ([id number]) 07/13/2017    Pneumococcal Polysaccharide ([id number]) 12/03/2018        Pneumococcal Vaccine     [x] Up to date    [] Indicated   [] Refused  [] Contraindicated       Influenza Vaccine   [x] Up to date    [] Indicated   [] Refused  [] Contraindicated        PAST MEDICAL HISTORY:       Diagnosis Date    CAD (coronary artery disease)     fatty pockets in the atrium    Cancer ([facility] [address] 40/6411    follicular lymphoma    Chest discomfort     Chronic back pain     \"pinched columns\", smokes marijuana prn for back pain    COPD (chronic obstructive pulmonary disease) ([facility] [address]     Cough     Depression     Hx of blood clots     Hyperlipidemia     Hypertension     Mediastinal lymphadenopathy     On home O2     prn    SOB (shortness of breath)     Wears dentures     upper,does not wear lower    Wears glasses          Family History:       Problem Relation Age of Onset    Heart Disease Mother     High Blood Pressure Mother     Cancer Mother         cervix    Heart Disease Father     High Blood Pressure Father     Stroke Father     Cancer Sister         cervix    Stroke Sister     Cancer Paternal Grandfather         lung       SURGICAL HISTORY:   Past Surgical History:   Procedure Laterality Date    ANKLE SURGERY Right     broken ankle with hardware inserted and later removed    APPENDECTOMY      BONE MARROW BIOPSY Left 12/05/2019    pelvic, benign    CARDIAC SURGERY      cardiac cath, no stents    FINE NEEDLE ASPIRATION Right 11/2019    lymph nodes, cancer found, follicular    HYSTERECTOMY, TOTAL ABDOMINAL  1982    with BSO    OTHER SURGICAL HISTORY      blood clot extraction from under bladder, angiogram to remove bloot clot and inserted \"something to keep the vein open\" ?stent?     SHOULDER SURGERY Right     rotator cuff    TONSILLECTOMY             SOCIAL AND OCCUPATIONAL HEALTH:      There  no history of TB or TB DO Adriana   amLODIPine (NORVASC) 10 MG tablet TAKE ONE TABLET BY MOUTH EVERY DAY 1/4/19  Yes Nolan Watler MD   buPROPion (WELLBUTRIN SR) 150 MG extended release tablet Take 1 tablet by mouth daily 7/13/17  Yes Nolan Walter MD   clopidogrel (PLAVIX) 75 MG tablet Take 75 mg by mouth daily    Historical Provider, MD   aspirin 81 MG tablet Take 81 mg by mouth daily    Historical Provider, MD          Review of Systems -  General ROS: negative for - chills, fatigue, fever or weight loss  ENT ROS: negative for - headaches, oral lesions or sore throat  Cardiovascular ROS: no chest pain , orthopnea or pnd   Gastrointestinal ROS: no abdominal pain, change in bowel habits, or black or bloody stools  Skin - no rash   Neuro - no blurry vision , no loc . No focal weakness   msk - no jt tenderness or swelling    Vascular - no claudication , rest completed and negative   Lymphatic - complete and negative   Hematology - oncology - complete and negative   Allergy immunology - complete and negative    no burning or hematuria    Vitals:  BP (!) 150/98 (Site: Right Upper Arm, Position: Sitting, Cuff Size: Large Adult)   Pulse 73   Resp 12   Ht 5' 4\" (1.626 m)   Wt 177 lb 12.8 oz (80.6 kg)   SpO2 93% Comment: room air  BMI 30.52 kg/m²     PHYSICAL EXAM:  Head and neck atraumatic, normocephalic    Lymph nodes-no cervical, supraclavicular lymphadenopathy    Neck-no JVP elevation    Lungs -AP diameter of chest increased. Thoracic expansion and diaphragmatic excursion diminished. BS diminished and expiratory phase prolonged. No dullness to percussion or tenderness to palpation. No bronchial breath sounds . CVS- S1, S2 regular. No S3 no S4, no murmurs    Abdomen-nontender, nondistended. Bowel sounds are present. No organomegaly    Lower extremity-no edema    Upper extremity-no edema    Neurological-grossly normal cranial nerves. No overt motor deficit     IMPRESSION:     Diagnosis Orders   1.  Stage 4 very severe

## 2020-01-25 NOTE — TELEPHONE ENCOUNTER
Received page from also service from the patient regarding prednisone prescription. Patient stated that Dr. Rafat Young recommended her to take prednisone prior to scheduled bronchoscopy on Tuesday, however her pharmacy is closed today and patient does not want to delay the procedure for that reason. She requested me to send the prescription to [facility] in [facility]. Will prescribe prednisone 30 mg daily for 6 days as originally planned.

## 2020-01-28 NOTE — H&P
History and Physical    Pt Name: Melba Suh  MRN: [id number]  YOB: 1955  Date of evaluation: 1/28/2020  Primary Care Physician: Charmain Baumgarten, DO    SUBJECTIVE:   History of Chief Complaint:    Melba Suh is a 77 y.o. female who is scheduled today for EBUS. She is s/p CT lung biopsy on 12/17/19 that revealed CATARINA adenocarcinoma, she has also had a +PET scan. She reports presenting today for further biopsy in 2 more areas. She is on home oxygen. Quit smoking tobacco in 2019. Allergies  has No Known Allergies. Medications  Prior to Admission medications    Medication Sig Start Date End Date Taking? Authorizing Provider   HYDROcodone-acetaminophen (NORCO) 5-325 MG per tablet Take 1 tablet by mouth every 6 hours as needed for Pain for up to 30 days. Intended supply: 30 days 1/24/20 2/23/20 Yes Kaleb Sadler MD   OXYGEN Inhale 2 L into the lungs daily as needed   Yes Historical Provider, MD   HYDROcodone-acetaminophen (NORCO) 5-325 MG per tablet Take 1 tablet by mouth every 6 hours as needed for Pain.    Yes Historical Provider, MD   albuterol sulfate  (90 Base) MCG/ACT inhaler Inhale 2 puffs into the lungs every 6 hours as needed for Wheezing 1/9/20 2/8/20 Yes Lois Duque MD   atorvastatin (LIPITOR) 80 MG tablet daily  12/6/19  Yes Historical Provider, MD   carvedilol (COREG) 25 MG tablet TAKE ONE TABLET BY MOUTH TWICE A DAY WITH MEALS 11/8/19  Yes Zhane Medhkour, DO   SYMBICORT 160-4.5 MCG/ACT AERO INHALE 1 PUFF BY MOUTH AS DIRECTED TWO TIMES A DAY 11/1/19  Yes Zhane Medhkour, DO   traZODone (DESYREL) 50 MG tablet TAKE ONE TABLET BY MOUTH DAILY AT BEDTIME 10/2/19  Yes Zhane Medhkour, DO   sertraline (ZOLOFT) 100 MG tablet Take 1.5 tablets by mouth daily  Patient taking differently: Take 200 mg by mouth daily  9/6/19  Yes Zhane Medhkour, DO   clopidogrel (PLAVIX) 75 MG tablet Take 75 mg by mouth daily   Yes Historical Provider, MD   aspirin 81 MG tablet Take 81 mg by mouth daily   Yes 5' 4\" (1.626 m) and weight is 180 lb (81.6 kg). Her temporal temperature is 96.4 °F (35.8 °C). Her blood pressure is 159/83 (abnormal) and her pulse is 64. Her respiration is 20 and oxygen saturation is 99%. CONSTITUTIONAL:Alert and orientated to person, place and time. No acute distress. Friendly, very pleasant. SKIN:  Warm & dry, no rashes on exposed skin  HEENT: HEAD: Normocephalic, atraumatic        EYES:  PERRL, EOMs intact, conjunctiva clear, wearing glasses       EARS:  Equal bilaterally, no edema or thickening, skin is intact without lumps or lesions. No discharge. NOSE:  Nares patent, septum midline, no rhinorrhea      MOUTH/THROAT:  Mucous membranes moist, tongue is pink, uvula midline,  dentures  NECK:  Supple, no lymphadenopathy, full ROM  LUNGS: Respirations even and non-labored. Decreased breath sounds throughout, supplemental oxygen in use  CARDIOVASCULAR: regular rate and rhythm, no murmurs/rubs/gallops   ABDOMEN: soft, non-tender, non-distended, bowel sounds active x 4   MUSCULOSKELETAL: Full ROM bilateral upper extremities, Full ROM bilateral lower extremities. Strength of 5/5 bilateral upper extremities. Strength 5/5 bilateral lower extremities. VASCULAR:  Brisk cap refill bilateral fingers. Radial pulses are intact, 2+ bilaterally. Dorsalis pedis pulse 2+ bilaterally. No edema or varicosities bilateral lower extremities  NEUROLOGIC: CN II-XII are grossly intact. Gait not assessed. IMPRESSIONS:   1.  Mediastinal lymphadenopathy       Diagnosis Date    Arthritis     CAD (coronary artery disease)     fatty pockets in the atrium, states \"30% beating\"    Cancer ([facility] [address]     follicular lymphoma- 40/7657, lung     Chest discomfort     CHF (congestive heart failure) (HCC)     Chronic back pain     \"pinched columns\", smokes marijuana prn for back pain    COPD (chronic obstructive pulmonary disease) (HCC)     Cough     Depression     History of hepatitis age 21    Hx of blood clots

## 2020-01-28 NOTE — ANESTHESIA PRE PROCEDURE
Take 1 tablet by mouth daily 7/13/17  Yes Arianne Mejía MD   predniSONE (DELTASONE) 10 MG tablet Take 3 tablets by mouth daily for 6 doses 1/25/20 1/31/20  Steve Jerez MD       Current medications:    Current Facility-Administered Medications   Medication Dose Route Frequency Provider Last Rate Last Dose    lactated ringers infusion   Intravenous Continuous Brian Lion MD           Allergies:  No Known Allergies    Problem List:    Patient Active Problem List   Diagnosis Code    Essential hypertension I10    Chronic systolic congestive heart failure (HCC) I50.22    Arthritis M19.90    Simple chronic bronchitis (HCC) J41.0    Closed fracture of right distal fibula S82.831A    Injury at C3 level of cervical spinal cord ([facility] [address] S14.103A    Depression F32.9       Past Medical History:        Diagnosis Date    CAD (coronary artery disease)     fatty pockets in the atrium, states \"30% beating\"    Cancer ([facility] [address]     follicular lymphoma- 56/2564, lung     Chest discomfort     Chronic back pain     \"pinched columns\", smokes marijuana prn for back pain    COPD (chronic obstructive pulmonary disease) ([facility] [address]     Cough     Depression     Hx of blood clots     Hyperlipidemia     Hypertension     Dr. Arthuro Captain Mediastinal lymphadenopathy     On home O2     all the time    SOB (shortness of breath)     Wears dentures     upper,does not wear lower    Wears glasses        Past Surgical History:        Procedure Laterality Date    ANKLE SURGERY Right     broken ankle with hardware inserted and later removed    APPENDECTOMY      BONE MARROW BIOPSY Left 12/05/2019    pelvic, benign    CARDIAC SURGERY      cardiac cath, no stents    FINE NEEDLE ASPIRATION Right 11/2019    lymph nodes, cancer found, follicular    HYSTERECTOMY, TOTAL ABDOMINAL  1982    with BSO    OTHER SURGICAL HISTORY      blood clot extraction from under bladder, angiogram to remove bloot clot and inserted \"something to keep the vein open\" ?stent?  SHOULDER SURGERY Right     rotator cuff    TONSILLECTOMY         Social History:    Social History     Tobacco Use    Smoking status: Former Smoker     Packs/day: 0.25     Years: 55.00     Pack years: 13.75     Types: Cigarettes     Start date: 1963     Last attempt to quit: 2019     Years since quittin.0    Smokeless tobacco: Never Used   Substance Use Topics    Alcohol use: No                                Counseling given: Not Answered      Vital Signs (Current):   Vitals:    20 1204 20 0625 20 0639   BP:   (!) 159/83   Pulse:   64   Resp:   20   Temp:   96.4 °F (35.8 °C)   TempSrc:   Temporal   SpO2:   99%   Weight: 177 lb (80.3 kg) 180 lb (81.6 kg)    Height: 5' 4\" (1.626 m) 5' 4\" (1.626 m)                                               BP Readings from Last 3 Encounters:   20 (!) 159/83   20 (!) 150/98   20 (!) 145/78       NPO Status: Time of last liquid consumption:                         Time of last solid consumption:                         Date of last liquid consumption: 20                        Date of last solid food consumption: 20    BMI:   Wt Readings from Last 3 Encounters:   20 180 lb (81.6 kg)   20 177 lb 12.8 oz (80.6 kg)   20 173 lb 6.4 oz (78.7 kg)     Body mass index is 30.9 kg/m².     CBC:   Lab Results   Component Value Date    WBC 7.5 2019    RBC 5.82 2019    HGB 15.8 2019    HCT 49.2 2019    MCV 84.5 2019    RDW 15.3 2019     2019       CMP:   Lab Results   Component Value Date     2019    K 3.7 2019     2019    CO2 31 2019    BUN 11 2019    CREATININE 0.63 2019    GFRAA >60 2019    LABGLOM >60 2019    GLUCOSE 122 2019    PROT 7.1 2019    CALCIUM 9.5 2019    BILITOT 0.26 2019    ALKPHOS 92 2019    AST 27 2019    ALT 32 2019 POC Tests: No results for input(s): POCGLU, POCNA, POCK, POCCL, POCBUN, POCHEMO, POCHCT in the last 72 hours. Coags:   Lab Results   Component Value Date    PROTIME 13.1 12/17/2019    INR 1.0 12/17/2019    APTT 28.9 12/17/2019       HCG (If Applicable): No results found for: PREGTESTUR, PREGSERUM, HCG, HCGQUANT     ABGs: No results found for: PHART, PO2ART, OIY3RAK, LVF4UMI, BEART, H0HBOTKP     Type & Screen (If Applicable):  No results found for: [facility]    Anesthesia Evaluation  Patient summary reviewed and Nursing notes reviewed no history of anesthetic complications:   Airway: Mallampati: II  TM distance: >3 FB   Neck ROM: limited  Mouth opening: > = 3 FB Dental:    (+) upper dentures, lower dentures and edentulous      Pulmonary:normal exam    (+) COPD:  shortness of breath:                             Cardiovascular:    (+) hypertension:, CAD:, CHF:,                ROS comment: History of cardiomyopathy     Neuro/Psych:   (+) psychiatric history:depression/anxiety             GI/Hepatic/Renal: Neg GI/Hepatic/Renal ROS            Endo/Other: Negative Endo/Other ROS   (+) malignancy/cancer. ROS comment: History of lymphoma Abdominal:           Vascular: negative vascular ROS. Anesthesia Plan      general     ASA 3     (ASA 3 for COPD with multiple co-morbidities including cardiomyopathy,  HPN, etc.  On home O2)  Induction: intravenous. MIPS: Postoperative opioids intended. Anesthetic plan and risks discussed with patient. Plan discussed with CRNA.               Siva Gan MD   1/28/2020

## 2020-01-31 PROBLEM — C34.91 PRIMARY ADENOCARCINOMA OF RIGHT LUNG (HCC): Status: ACTIVE | Noted: 2020-01-01

## 2020-01-31 NOTE — TELEPHONE ENCOUNTER
Name: Ramona Reddy  : 1955  MRN: [id number]    Oncology Navigation Follow-Up Note    Contact Type:  Telephone  Notes: Spoke with Georgina Stoner, [address] @ [phone] to inquired on referral.  Georgina Stoner stated referral faxed to [facility]/Breast oncology & will fax to pulmonary oncology @ [phone]. Georgina Stoner stated will transfer writer to [facility]/pulmonary oncology @ [phone], call transferred. Spoke with Maria De Jesus Servin, [facility]/Pulmonary oncology, updated on pt & referral.  Leda Baeza would like pt seen next week if possible. Maria De Jesus Servin stated once receives fax will give to [facility]/Pulmonary oncology team to review. Leda Bernabe may contact writer @ [phone] prn. Will continue to follow.     Electronically signed by Robles Calderon RN on 2020 at 1:39 PM

## 2020-01-31 NOTE — TELEPHONE ENCOUNTER
Pt having CT chest at [facility] on 2/5/2020, arrive at 0845-pt being referred to [address] [place] [address] oncology (p #[phone], U#[phone]), pt info faxed, confirmation rec'd-[facility] to contact pt for appt. Gael Riojas RN to inform writer when pt is to be scheduled for a f/u appt w/Dr Jacinto.

## 2020-01-31 NOTE — PROGRESS NOTES
Date  1/31/2020     1. Mental Ability: confusion/cognitively impaired 0     2. Elimination Issues: incontinence, frequency 0       3. Ambulatory: use of assistive devices (walker, cane, off-loading devices),        attached to equipment (IV pole, oxygen) 0     4. Sensory Limitations: dizziness, vertigo, impaired vision 0     5. Age less than 65        0     6. Age 72 or greater 1     7. Medication: diuretics, strong analgesics, hypnotics, sedatives,        antihypertensive agents 0   8. Falls:  recent history of falls within the last 3 months (not to include slipping or        tripping) 0   TOTAL 1    If score of 4 or greater was education given? No           TABLE 2   Risk Score Risk Level Plan of Care   0-3 Little or  No Risk 1. Provide assistance as indicated for ambulation activities  2. Reorient confused/cognitively impaired patient  3. Chair/bed in low position, stretcher/bed with siderails up except when performing patient care activities  5. Educate patient/family/caregiver on falls prevention  6.  Reassess in 12 weeks or with any noted change in patient condition which places them at a risk for a fall   4-6 Moderate Risk 1. Provide assistance as indicated for ambulation activities  2. Reorient confused/cognitively impaired patient  3. Chair/bed in low position, stretcher/bed with siderails up except when performing patient care activities  4. Educate patient/family/caregiver on falls prevention     7 or   Higher High Risk 1. Place patient in easily observable treatment room  2. Patient attended at all times by family member or staff  3. Provide assistance as indicated for ambulation activities  4. Reorient confused/cognitively impaired patient  5. Chair/bed in low position, stretcher/bed with siderails up except when performing patient care activities  6. Educate patient/family/caregiver on falls prevention         PLAN: Patient is seen today in follow up post [place].  Dr Wendi Tay notified

## 2020-01-31 NOTE — TELEPHONE ENCOUNTER
Name: Lise Nelson  : 1955  MRN: [id number]    Oncology Navigation Follow-Up Note    Contact Type:  Medical Oncology  Notes: Pt @ [facility] for dual MO/RO f/u. Accompanied Dr. Karlie Celis in exam room. Dr. Karlie Celis spoke with pt @ length r/t recent EBUS w/bx, tx options, referral to tertiary center, & repeat CT imaging. Spoke with Alena Dunaway, [facility] thoracic oncology nurse navigator, updated on pt, notified pt's insurance, & inquired on urgent consultation. Roxie requested pt's demographics & stated will have [facility] pre cert look into asap, demographics sent to Alena Dunaway. Roxie stated will contact writer asap to inform if pt able to be scheduled. Pt, Dr. Lesa Coe, [facility] , updated. Will continue to follow. Roxie called back stating [facility] out of network. Dr. Karlie Celis updated, new referral for [facility] received. Tammy Chand, [facility] , updated. Will continue to follow.     Electronically signed by Quoc Rausch RN on 2020 at 9:32 AM

## 2020-02-04 NOTE — PROGRESS NOTES
Katia Bar                                                                                                                  1/31/2020  MRN:   [id number]  YOB: 1955  PCP:                           [facility],   Referring Physician: No ref. provider found  Treating Physician Name: Lauren Miller MD      Reason for visit:  Chief Complaint   Patient presents with    Follow-up     review status of disease    Results     go over the ebus    Other     Go over the next steps in trearment     Current problems/ Active and recent treatments:  Low-grade follicular lymphoma, XO10 positive, early stage II  COPD  Lipomatous hypertrophy of inter-atrial septum    Interval history:     Patient presents to the clinic for a follow-up visit and to discuss results of biopsy from the mediastinal lymph node which is negative for malignancy. Patient complains of cough. Shortness of breath is improved since she received oxygen. Patient is anxious to start treatment. Denies nausea vomiting. Denies fever chills. Denies hospitalization. Pain is controlled. During this visit patient's allergy, social, medical, surgical history and medications were reviewed and updated. Summary of Case/History:    Katia Bar a 77 y. o.female is a presents to the clinic to establish care and for further evaluation treatment recommendation. Patient initially noted a lump below her right ear back in April 2019. As the lump grew patient seek medical attention and was referred to ENT for further evaluation. Patient underwent FNA on 10/30/2019 which came back consistent with mature B-cell neoplasm expressing CD10, consistent with low-grade follicular lymphoma. Patient feels that the lump has become much smaller but is now starting to become painful. Patient does complain of fluctuation in her weight. She initially lost about 10 pounds but was able to regain it. Patient does complain of night sweats.   Denies any excessive fatigue fevers. Denies any other swollen glands. Her blood work-up done back in September did not show any cytopenias. Patient denies any difficulty swallowing. Denies any hearing loss. She is up-to-date on mammograms. Past Medical History:   Past Medical History:   Diagnosis Date    Arthritis     CAD (coronary artery disease)     fatty pockets in the atrium, states \"30% beating\"    Cancer ([address]     follicular lymphoma- 10/2026, lung     Chest discomfort     CHF (congestive heart failure) (HCC)     Chronic back pain     \"pinched columns\", smokes marijuana prn for back pain    COPD (chronic obstructive pulmonary disease) (HCC)     Cough     Depression     History of hepatitis age 21    Hx of blood clots     Hyperlipidemia     Hypertension     Dr. Vaughn Briones Mediastinal lymphadenopathy     On home O2     all the time    SOB (shortness of breath)     Wears dentures     upper,does not wear lower    Wears glasses        Past Surgical History:     Past Surgical History:   Procedure Laterality Date    ANKLE SURGERY Right     broken ankle with hardware inserted and later removed    APPENDECTOMY      BONE MARROW BIOPSY Left 12/05/2019    pelvic, benign    BRONCHOSCOPY N/A 1/28/2020    EBUS WITH STATION 4L LYMPH NODE FNA performed by Ana Paz MD at [facility]      cardiac cath, no stents    FINE NEEDLE ASPIRATION Right 11/2019    lymph nodes, cancer found, follicular    HYSTERECTOMY, TOTAL ABDOMINAL  1982    with BSO    OTHER SURGICAL HISTORY      blood clot extraction from under bladder, angiogram to remove bloot clot and inserted \"something to keep the vein open\" ?stent?  OTHER SURGICAL HISTORY  01/28/2020    ENDOBRONCHIAL ULTRASOUND WITH STATION 4L LYMPH NODE FNA    SHOULDER SURGERY Right     rotator cuff    TONSILLECTOMY         Patient Family Social History:     Social History     Socioeconomic History    Marital status:       Spouse name: for Pain.  albuterol sulfate  (90 Base) MCG/ACT inhaler Inhale 2 puffs into the lungs every 6 hours as needed for Wheezing 1 Inhaler 6    atorvastatin (LIPITOR) 80 MG tablet daily       carvedilol (COREG) 25 MG tablet TAKE ONE TABLET BY MOUTH TWICE A DAY WITH MEALS 60 tablet 3    SYMBICORT 160-4.5 MCG/ACT AERO INHALE 1 PUFF BY MOUTH AS DIRECTED TWO TIMES A DAY 10.2 g 2    traZODone (DESYREL) 50 MG tablet TAKE ONE TABLET BY MOUTH DAILY AT BEDTIME 30 tablet 5    sertraline (ZOLOFT) 100 MG tablet Take 1.5 tablets by mouth daily (Patient taking differently: Take 200 mg by mouth daily ) 30 tablet 5    clopidogrel (PLAVIX) 75 MG tablet Take 75 mg by mouth daily      aspirin 81 MG tablet Take 81 mg by mouth daily      amLODIPine (NORVASC) 10 MG tablet TAKE ONE TABLET BY MOUTH EVERY DAY 90 tablet 1    buPROPion (WELLBUTRIN SR) 150 MG extended release tablet Take 1 tablet by mouth daily 60 tablet 3     No current facility-administered medications for this visit. Allergies:   Patient has no known allergies. Review of Systems:    Constitutional: No fever or chills. Positive night sweats,   Eyes: No eye discharge, double vision, or eye pain   HEENT: Mass below her right ear decrease in size. Positive cough. Respiratory: negative for cough , sputum, dyspnea, wheezing, hemoptysis, chest pain   Cardiovascular: negative for chest pain, dyspnea, palpitations, orthopnea, PND   Gastrointestinal: negative for nausea, vomiting, diarrhea, constipation, abdominal pain, Dysphagia, hematemesis and hematochezia   Genitourinary: negative for frequency, dysuria, nocturia, urinary incontinence, and hematuria   Integument: negative for rash, skin lesions, bruises.    Hematologic/Lymphatic: negative for easy bruising, bleeding, lymphadenopathy, or petechiae   Endocrine: negative for heat or cold intolerance,weight changes, change in bowel habits and hair loss   Musculoskeletal: negative for myalgias, arthralgias, pain, joint swelling,and bone pain   Neurological: negative for headaches, dizziness, seizures, weakness, numbness        Physical Exam:    Vitals: BP (!) 159/99   Pulse 54   Temp 98.5 °F (36.9 °C) (Oral)   Wt 193 lb 14.4 oz (88 kg)   SpO2 98%   BMI 33.28 kg/m²   General appearance - well appearing, no in pain or distress  Mental status - AAO X3  Eyes - pupils equal and reactive, extraocular eye movements intact  Mouth - mucous membranes moist, pharynx normal without lesions  Neck -positive right cervical lymphadenopathy, decrease in size  Lymphatics - no palpable lymphadenopathy, no hepatosplenomegaly  Chest -decreased breathing sounds bilaterally  Heart - normal rate, regular rhythm, normal S1, S2, no murmurs  Abdomen - soft, nontender, nondistended, no masses or organomegaly  Neurological - alert, oriented, normal speech, no focal findings or movement disorder noted  Extremities - peripheral pulses normal, no pedal edema, no clubbing or cyanosis  Skin - normal coloration and turgor, no rashes, no suspicious skin lesions noted       DATA:    Results for orders placed or performed during the hospital encounter of 12/17/19   APTT   Result Value Ref Range    PTT 28.9 24.0 - 36.0 sec   Platelet count   Result Value Ref Range    Platelets 009 712 - 740 k/uL   Protime-INR   Result Value Ref Range    Protime 13.1 11.8 - 14.6 sec    INR 1.0    Surgical Pathology   Result Value Ref Range    Surgical Pathology Report       [address]  [phone]  Fax: [phone]  SURGICAL PATHOLOGY REPORT    Patient Name: Reanna Mahmood  MR#: [id number]  Specimen #[id number]       Final Diagnosis      LEFT LUNG, CT-GUIDED BIOPSY:         POSITIVE FOR MALIGNANCY         POORLY DIFFERENTIATED ADENOCARCINOMA CONSISTENT WITH LUNG PRIMARY  ORIGIN      Sussy Ann D.O.  **Electronically Signed Out**         lv/12/18/2019  Frozen Section Diagnosis  Rapid Cytologic Interpretation:  Satisfactory for evaluation. Positive for malignancy. Non-small cell carcinoma. Findings conveyed to Dr. Home Mccullough at 12:25. Sveta Walker D.O. Clinical Information  Diffuse follicle center lymphoma of lymph nodes of neck, lung mass,  parotid mass, back pain, unspecified back location, unspecified back  pain laterally, unspecified chronicity    Source:  A: Lt lung aspirate core biopsy, Touch Preps    Gross Description  R eceived fresh are multiple cylindrical portions of gray-tan soft  tissue. Touch preps are obtained from these cores. The specimen is  entirely submitted in a single cassette. Microscopic Description  Touch preps demonstrate aggregates of markedly atypical cells with  enlarged irregularly-shaped hyperchromatic nuclei. Core biopsies  demonstrate fibrous connective tissue with an invasive malignant  neoplasm. The neoplasm is similar to that seen in the touch preps. Immunostains performed show the lesional cells to be positive for CK7,  TTF-1, and Pankeratin. Focal positive staining is noted with  Mucicarmine. Negative staining is noted with P40 and CK20 (all  controls adequate). CTA ABD/PEL LOWER EXTREM W IVCON4/9/2019  [facility]  Result Impression   IMPRESSION:  1.  MODERATE INFRARENAL ABDOMINAL AORTIC ATHEROSCLEROTIC DISEASE WITH   GRADUALLY PROGRESSIVE MILD LUMINAL NARROWING DISTALLY TO BIFURCATION.    MODERATE LEFT RENAL ARTERY OSTIAL STENOSIS. 2.  RIGHT LOWER EXTREMITY: BULKY CALCIFIED PLAQUES INVOLVING EXTERNAL   ILIAC ARTERY CAUSING MILD TO MODERATE LUMINAL NARROWING IN 1.3 CM THE   PROXIMAL SEGMENT.  NO OTHER INFLAMMATION.  NO SIGNIFICANT INFRAINGUINAL   LESION.  2 VESSELS RUNOFF.  ABSENT VERSUS DIMINUTIVE POSTERIOR TIBIAL   ARTERY.  PERONEAL ARTERY OF GOOD CALIBER AND CONTINUES AS POSTERIOR   TIBIAL ARTERY INTO FOOT.     3.  LEFT LOWER EXTREMITY: BULKY CALCIFIED PLAQUES INVOLVING THE EXTERNAL   ILIAC ARTERY CAUSING MODERATE TO HIGH-GRADE LUMINAL NARROWING IN THE 1.5   CM PROXIMAL SEGMENT.  NO OTHER INFLAMMATION.  NO SIGNIFICANT   INFRAINGUINAL LESION.  2 VESSELS RUNOFF.  ABSENT VERSUS DIMINUTIVE   POSTERIOR TIBIAL ARTERY.  PERONEAL ARTERY OF CALIBER AND CONTINUES AS   POSTERIOR TIBIAL ARTERY INTO FOOT. 4. STABLE INCIDENTAL FINDINGS INCLUDING EXTENSIVE LIPOMATOUS HYPERTROPHY   OF THE INTERATRIAL SEPTUM, CHOLELITHIASIS AND SIGMOID DIVERTICULOSIS. Results for orders placed or performed during the hospital encounter of 01/28/20   Surgical Pathology   Result Value Ref Range    Surgical Pathology Report       [id number]  [address], [address]. [place], [address]  [phone]  Fax: [phone]    [address]     Patient Name: Luke Good: [id number]  Path Number: [id number]  Collected: 1/28/2020  Received: 1/28/2020  Reported: 1/30/2020 08:29    -- Diagnosis --  FINE NEEDLE ASPIRATION EBUS STATION 4L LYMPH NODE:          NEGATIVE FOR MALIGNANCY. Aguilar Krishnamurthy M.D  **Electronically Signed Out**         lucia/1/30/2020       Clinical Information  Mediastinal lymphadenopathy. Source of Specimen  1: FINE NEEDLE ASPIRATION EBUS STATION 4L LYMPH NODE    Gross Description  \"STATION 4L\" Specimen received in CytoLyt solution, light red fluid  with small red clots, 1 DQ slide prepared. IMMEDIATE EVALUATION:    Evaluation episode:  1. lymphoid cells, benign epithelial cells,  blood. No malignancy. Ash Thompson MD.      MICROSCOPIC DESCRIPTION     Micro scopic examination performed. The cell block slide shows small pieces of lymphoid tissue. The CD3  immunostain highlights the T cells. CD5 and BCL-2 immunostains have  the same staining pattern as the CD3, indicating T cells. CD20  highlights background B cells. CD10 highlights rare positive cells. Cyclin D1 is negative.   All questions to the best of my ability. Patient expressed understanding and was in agreement. Natalee iGllis      I spent more than 40 minutes examining, evaluating, reviewing data, counseling the patient and coordinating care. Greater than 50% of time was spent face-to-face with the patient this note is created with the assistance of a speech recognition program.  While intending to generate a document that actually reflects the content of the visit, the document can still have some errors including those of syntax and sound a like substitutions which may escape proof reading. It such instances, actual meaning can be extrapolated by contextual diversion.

## 2020-02-04 NOTE — TELEPHONE ENCOUNTER
Pt info faxed to [facility] @ #[phone], confirmation rec'd-[facility] to contact pt for appt.   Called [facility] at [phone], message left for [facility] to call [facility] for questions

## 2020-02-04 NOTE — TELEPHONE ENCOUNTER
Name: Francesco Hein  : 1955  MRN: [id number]    Oncology Navigation Follow-Up Note    Contact Type:  Telephone  Notes: Spoke with Javon Ramirez, [facility] pulmonary oncology, ([phone]) inquired on referral.  Javon Ramirez stated financial team needs to speak with pt for financial clearance. Muna De Leon pt needs seen asap. Haley stated will place stat request for financial review. Haley stated once financial clearance completed will contact pt to schedule. Requested update writer @ [phone] once pt scheduled. Will continue to follow.     Electronically signed by Noble Easley RN on 2020 at 9:44 AM

## 2020-02-06 NOTE — TELEPHONE ENCOUNTER
Name: Stefan Hernandez  : 1955  MRN: [id number]    Oncology Navigation Follow-Up Note    Contact Type:  Telephone  Notes: Attempted to contact [facility] @ [phone] to inquire on referral, no answer, VM left requesting contact writer @ [phone] asap. Spoke with Barbara Post, Stacey Crowley , requested pt's imaging electronically sent to [facility]. Will continue to follow.     Electronically signed by David Anderson RN on 2020 at 8:48 AM

## 2020-02-06 NOTE — TELEPHONE ENCOUNTER
Name: Ramona Reddy  : 1955  MRN: [id number]    Oncology Navigation Follow-Up Note    Contact Type:  Telephone  Notes: Dr. Carlos Baeza called in stating spoke with Dr. Luly Ramon who recommends mediastinoscopy w/bx. Dr. Carlos Baeza requested writer inquire if pt's insurance in network for Dr. Anamaria Lemos, Hanane Lerma cardio-thoracic surgeon. Spoke with Crow Grayson, Dr. Veronique Cook office, [phone], updated on pt & Dr. Zhao Kwok request.  Crow Grayson requested writer fax copy of pt's insurance card to [phone]  Attn: Crow Grayson & will contact writer once reviews. Spoke with Ramana Ortiz, [facility], requested copy of insurance card faxed stat to Crow Grayson. Crow Grayson called writer stating reviewed pt's insurance & Dr. Anamaria Lemos is out network. Dr. Carlos Baeza updated. Await [facility] consultation. Will continue to follow.        Electronically signed by Robles Calderon RN on 2020 at 2:58 PM

## 2020-02-07 NOTE — TELEPHONE ENCOUNTER
Name: Stefan Hernandez  : 1955  MRN: [id number]    Oncology Navigation Follow-Up Note    Contact Type:  Telephone  Notes: Pt left VM stating scheduled for [facility] consultation 2020 @ 1000. Dr. David Mcginnis updated. Will continue to follow.     Electronically signed by David Anderson RN on 2020 at 6:53 AM

## 2020-02-07 NOTE — PROGRESS NOTES
REASON FOR THE CONSULTATION:  Mediastinal lymphadenopathy  HISTORY OF PRESENT ILLNESS:    Zulay Donald is a 77y.o. year old female   Pt is here post ebus tbna of 4 l ln   Results do not shows metastatic disease   Case d/w Dr Lanre Serrato - he is concerned about metastasis to mediastrinum and I could only get one pass of LN . Pt is over all doing better since she started home o2   Using symbicort bid   Has albuterol for rescue   No cough hemoptysis    Last visit   is accompanied by her friend. Patient was recently diagnosed with left upper lobe adenocarcinoma of the lung after CT-guided biopsy 12/17/2019. Patient had a PET CT scan which showed 4L lymph node which was PET positive she has been referred here for evaluation for endobronchial ultrasound and transbronchial needle aspiration. Patient also has shortness of breath with exertion grade 3. She does cough but does not complain of active wheeze occasionally wheezes with exertion denies chest pain no hemoptysis she is on Symbicort. But does not have a rescue inhaler                 LUNG CANCER SCREENING     1. CRITERIA MET    []     CT ORDERED  []      2. CRITERIA NOT MET   [x]      3. REFUSED                    []        REASON CRITERIA NOT MET     1. SMOKING LESS THAN 30 PY  []      2. AGE LESS THAN 55 or GREATER 77 YEARS  []      3. QUIT SMOKING 15 YEARS OR GREATER   []      4. RECENT CT WITH IN 11 MONTHS    []      5. LIFE EXPECTANCY < 5 YEARS   []      6.  SIGNS  AND SYMPTOMS OF LUNG CANCER   [x]         Immunization   Immunization History   Administered Date(s) Administered    Influenza A ([id number]) Vaccine PF IM 02/02/2010    Influenza Whole 11/12/2008    Influenza, Triv, inactivated, subunit, adjuvanted, IM (Fluad 65 yrs and older) 10/02/2019    Pneumococcal Conjugate 13-valent ([id number]) 07/13/2017    Pneumococcal Polysaccharide ([id number]) 12/03/2018        Pneumococcal Vaccine     [x] Up to date    [] Indicated   [] Refused  [] Contraindicated Umeclidinium Bromide (INCRUSE ELLIPTA) 62.5 MCG/INH AEPB   2. Adenocarcinoma of left lung ([address]     3. Chronic respiratory failure with hypoxia (HCC)          :                PLAN:      D/w Dr Lois Fragoso - 4L LN ebus tbna is negative for malignancy but concern that this may be inadequate sample since I could only get one pass ( 7 mm LN close to major blood vessel ) . I will recommend mediastinoscopy so as to   rule out mediastinal  metastasis of adenocarcinoma of lung since this will aid in treatment planning . D/w patient   Start incruse , continue Symbicort and albuterol   Continue home oxygen   Follow up 1 month      Requested Prescriptions     Signed Prescriptions Disp Refills    Umeclidinium Bromide (INCRUSE ELLIPTA) 62.5 MCG/INH AEPB 1 each 5     Sig: Inhale 1 puff into the lungs daily       There are no discontinued medications. Yulissa Redmond received counseling on the following healthy behaviors: nutrition, exercise and medication adherence    Patient given educational materials : see patient instruction       Discussed use, benefit, and side effects of prescribed medications. Barriers to medication compliance addressed. All patient questions answered. Pt voiced understanding. I hope this updates you on my evaluation and clinical thinking. Thank you for allowing me to participate in his care. Sincerely,      Electronically signed by German Mao MD on 2/6/2020 at 9:56 PM       Please note that this chart was generated using voice recognition Dragon dictation software. Although every effort was made to ensure the accuracy of this automated transcription, some errors in transcription may have occurred.

## 2020-02-10 NOTE — TELEPHONE ENCOUNTER
Chemo orders received, ht 64\", wt 186lbs, and bsa 1.95 verified. Dose of chemotherapy verified;   Taxol 45mg/m2= 88mg  Carbo AUC 2 to be calculated at tx

## 2020-02-12 NOTE — TELEPHONE ENCOUNTER
Name: Benny Poster  : 1955  MRN: [id number]    Oncology Navigation Follow-Up Note    Contact Type:  Telephone  Notes: Pt called in stating completed [facility] consultation & stated \"they said go with the aggressive treatment\". Pt stated MD does not recommend mediastinoscopy w/bx. Instructed pt writer will update Dr. Kavita Loomis. Upon review of [facility] care everywhere noted Dr. Harry Calvo ([facility] [place]) consult note not available. Spoke with Dr. Kavita Loomis @ [facility], updated on conversation with pt & await [facility] consult note. Both MD's requested [facility] contacted in am if note unavailable. Dr. Mcghee Host stated once confirm [facility] recommendations may place order for port placement with  IR. Spoke with pt updated on conversation with MD's & instructed will contact after [facility] recommendations confirmed. Pt verbalized understanding & thanked writer. Will continue to follow.     Electronically signed by Genaro Cooper RN on 2020 at 4:32 PM

## 2020-02-13 NOTE — TELEPHONE ENCOUNTER
Name: Buck Calles  : 1955  MRN: [id number]    Oncology Navigation Follow-Up Note    Contact Type:  Telephone  Notes: Spoke with Dr. Juliana Young office ([facility]), notified await yesterday's consult note. Yulia Hamilton if unable to complete today Dr. Ida Young may contact Dr. Anner Habermann directly & contact # given. Dr. Anner Habermann updated. Will continue to follow.     Electronically signed by Eliazar Powell RN on 2020 at 9:55 AM

## 2020-02-14 NOTE — TELEPHONE ENCOUNTER
Name: Jv Rhodes  : 1955  MRN: [id number]    Oncology Navigation Follow-Up Note    Contact Type:  Radiation Oncology  Notes: Dr. Mullins January alerted Natty Xavier spoke with Dr. Eric Cordova yesterday after Dr. Eric Cordova spoke with Dr. Rajesh Karimi, [address] [place] [address]. Dr. Mullins January stated Dr. Eric Cordova requested pt keep scheduled f/u 2020 to discuss [facility] recommendations & tx plan. Dr. Mullins January stated will be present for f/u. Pt currently scheduled for Dr. Eric Cordova f/u 2020 @ 21 [phone]. Spoke with pt via telephone, updated on conversation with Dr. Mullins January, notified 2020 is f/u only & no tx to start. Pt verbalized understanding. Spoke with [facility] , updated on pt. Will continue to follow.     Electronically signed by Gavin Jean Baptiste RN on 2020 at 11:12 AM

## 2020-02-19 NOTE — PROGRESS NOTES
Francia Correa                                                                                                                  2/19/2020  MRN:   [id number]  YOB: 1955  PCP:                           Lisa Frausto DO  Referring Physician: No ref. provider found  Treating Physician Name: Scarlett Knight MD      Reason for visit:  Chief Complaint   Patient presents with    Follow-up     review status of disease    Fatigue     Current problems/ Active and recent treatments:  Low-grade follicular lymphoma, EM65 positive, early stage I/II  Adenocarcinoma of left lung  COPD  Lipomatous hypertrophy of inter-atrial septum    Interval history:     Patient presents to the clinic for a follow-up visit and to discuss further recommendations and treatment plan. Since last office visit patient was seen by medical oncology in [facility] clinic. Patient denies any new complaint or interval event Repeat CT scan show slight increase in size of the lingular mass which now measures 4.4 cm. Cluster of aortopulmonary lymph nodes have also slightly increased in size. Patient neck mass is not palpable anymore. Weight is stable. Denies any night sweats. During this visit patient's allergy, social, medical, surgical history and medications were reviewed and updated. Summary of Case/History:    Francia Correa a 77 y. o.female is a presents to the clinic to establish care and for further evaluation treatment recommendation. Patient initially noted a lump below her right ear back in April 2019. As the lump grew patient seek medical attention and was referred to ENT for further evaluation. Patient underwent FNA on 10/30/2019 which came back consistent with mature B-cell neoplasm expressing CD10, consistent with low-grade follicular lymphoma. Patient feels that the lump has become much smaller but is now starting to become painful. Patient does complain of fluctuation in her weight.   She initially lost Date/Time     12/13/2019 1100    K 3.7 12/13/2019 1100     12/13/2019 1100    CO2 31 12/13/2019 1100    BUN 11 12/13/2019 1100    CREATININE 0.54 02/05/2020 0918        Component Value Date/Time    CALCIUM 9.5 12/13/2019 1100    ALKPHOS 92 12/13/2019 1100    AST 27 12/13/2019 1100    ALT 32 12/13/2019 1100    BILITOT 0.26 (L) 12/13/2019 1100          Results for orders placed or performed during the hospital encounter of 12/17/19   APTT   Result Value Ref Range    PTT 28.9 24.0 - 36.0 sec   Platelet count   Result Value Ref Range    Platelets 514 298 - 997 k/uL   Protime-INR   Result Value Ref Range    Protime 13.1 11.8 - 14.6 sec    INR 1.0                         Results for orders placed or performed during the hospital encounter of 02/05/20   Creatinine, Serum   Result Value Ref Range    CREATININE 0.54 0.50 - 0.90 mg/dL    GFR Non-African American >60 >60 mL/min    GFR African American >60 >60 mL/min    GFR Comment          GFR Staging NOT REPORTED       Ct Chest W Contrast    Result Date: 2/5/2020  EXAMINATION: CT OF THE CHEST WITH CONTRAST 2/5/2020 9:18 am TECHNIQUE: CT of the chest was performed with the administration of intravenous contrast. Multiplanar reformatted images are provided for review. Dose modulation, iterative reconstruction, and/or weight based adjustment of the mA/kV was utilized to reduce the radiation dose to as low as reasonably achievable. COMPARISON: PET-CT 11/29/2019 HISTORY: ORDERING SYSTEM PROVIDED HISTORY: Head and neck cancer [facility]) TECHNOLOGIST PROVIDED HISTORY: Kindly compare to previous scan brenton mediastinal LAD lung cancer , follicular lymphoma Reason for Exam: follow up to lung ca, follicular lymphoma Acuity: Chronic Type of Exam: Subsequent/Follow-up FINDINGS: Mediastinum: Cardiac size normal.  Increased mediastinal fat particularly around the right atrium. Normal caliber aorta. Atherosclerotic disease evident.   Esophagus and thyroid gland grossly normal. There is a cluster of 4 aortopulmonary window lymph nodes. Posterior to lymph nodes which measure 11 mm and 8 mm in short axis where FDG avid on prior PET-CT. Lungs/pleura: There is previously noted FDG avid masslike consolidation in the lingula along the major fissure measuring 1.9 x 4.4 cm on series 4, image 63 which measures 1.6 x 4.0 cm on corresponding image of prior PET-CT. Surrounding hazy airspace densities and reticular thickening appears slightly more prominent. There is accompanying subsegmental atelectasis inferiorly in the lingula. No pleural effusion or pneumothorax. Upper Abdomen: Partially imaged left renal cyst.  There is 8 mm anterior subcapsular segment hypodensity in the left lobe of liver segment 4 A likely cyst. Soft Tissues/Bones: No acute abnormality of the bones. The superficial soft tissues show no significant abnormalities. 1. Known FDG avid masslike consolidation in the lingula along the major fissure with some surrounding patchy airspace disease measures approximately 1.9 x 4.4 cm, previously 1.6 x 4.0 cm. 2. Cluster of aortopulmonary window lymph nodes some of which were FDG avid again noted. Largest 11 mm in short axis. This measured 9 mm on the PET-CT. 3. Partially imaged left renal cyst and 8 mm benign-appearing probable cyst in the anterior subcapsular left lobe of liver. Impression:  Low-grade follicular lymphoma, LG30 positive, early stageI/II  Poorly differentiated adenocarcinoma of left lung, probably stage III  Mediastinal lymphadenopathy, PET positive, negative on biopsy  Peripheral vascular disease  History of cocaine abuse  COPD  Lipomatous hypertrophy of inter-atrial septum  History of tobacco dependence, quit in January 2019    Plan:  I had a detailed discussion with the patient personally went over results of her lab work-up imaging studies and other relevant clinical data.   Also reviewed results of patient repeat CT scan which show increase in size of the lymph

## 2020-02-19 NOTE — TELEPHONE ENCOUNTER
Name: Patricio Bach  : 1955  MRN: [id number]    Oncology Navigation Follow-Up Note    Contact Type:  Telephone    Subjective:     Objective:     Assistance Needed:     Receptive to Advanced Care Planning / Palliative Care:      Referrals:     Education:     Notes: navigator spoke with Dr. Taurus Mccullough staff and MD not accepting pts. With Lung/chest malignancies only cardiac/vascular pts. New partner thought to be starting sometime in March or April. Navigator and MD informed.     Electronically signed by Susannah Pallas, RN on 2020 at 10:17 AM

## 2020-02-19 NOTE — TELEPHONE ENCOUNTER
Name: Stacia Nguyen  : 1955  MRN: [id number]    Oncology Navigation Follow-Up Note    Contact Type:  Medical Oncology  Notes: Pt @ [facility] for Dr. Aniya Oliver f/u. Dr. Julianne Toth updated & accompanied Dr. Aniya Oliver in exam room. Dr. Aniya Oliver alerted Aida Pantoja spoke with Scooby Clark, [facility] thoracic navigator, to [facility] for mediastinoscopy. Dr. Aniya Oliver stated new order for port placement placed & will contact pt [facility]. Accompanied pt to check out & instructed may contact writer prnCammy Valdez called writer & notified [place] [address] CT surgeon not accepting pt's with lung/chest malignancies r/t currently one surgeon available. Dr. Destiny Toth updated. Dr. Aniya Oliver stated will contact pt & plan to proceed with concurrent chemotherapy/XRT. Dr. Aniya Oliver stated pt notified. Dr. Julianne Toth stated pt will need re-simulation. Rashmi Beck, [facility] RO , updated on pt & Dr. Tono Rivers order for re-simulation. Pt scheduled 2020 @ 1330. Spoke with Isaias Jimenez, [facility] [place] , updated on pt & requested chemotherapy teaching scheduled. Isaias Jimenez stated chemotherapy teaching to be completed after simulation 2020. Scott Dunaway, [facility] , alerted writer port placement scheduled 2020. Spoke with pt via telephone, updated on appt details, pt verbalized understanding. Will continue to follow.      Electronically signed by Edwin Moreno RN on 2020 at 1:54 PM

## 2020-02-19 NOTE — TELEPHONE ENCOUNTER
Chemo being held at this time per MD/per Gregory Rios RN-pt having port placed at [facility] on 2/24/2020-arrive at 0930-pt will return to see Dr Anner Habermann w/tx, will coordinate w/Nhi BORDEN RN

## 2020-02-24 NOTE — H&P
History and Physical Update    Pt Name: Tutu Thompson  MRN: [id number]  YOB: 1955  Date of evaluation: 2/24/2020      [x] I have reviewed the OFFICE CONSULT NOTES OF 2 /19/2020 BY   which meets the criteria for an Interval History and Physical note COPIED BELOW. [x] I have examined  Tutu Thompson  There are no changes to the patient who is scheduled for a [address] [facility] IN INTERVENTIONAL RADIOLOGY. The patient denies health changes, fever, chills, productive cough, SOB, chest pain, open sores or wounds. SHE STOPPED TAKING PLAVIX AND ASA 6 DAYS AGO, SHE DOES NOT HAVE DIABETES. Vital signs: /77   Pulse 57   Temp 98.1 °F (36.7 °C) (Oral)   Resp 18   SpO2 98%     Allergies:  Patient has no known allergies. Medications:    Prior to Admission medications    Medication Sig Start Date End Date Taking? Authorizing Provider   lidocaine-prilocaine (EMLA) 2.5-2.5 % cream Apply topically as needed. 2/10/20   Jt Gonzales MD   ondansetron (ZOFRAN-ODT) 8 MG TBDP disintegrating tablet Place 1 tablet under the tongue 3 times daily as needed for Nausea or Vomiting 2/10/20   Jt Gonzales MD   Umeclidinium Bromide (INCRUSE ELLIPTA) 62.5 MCG/INH AEPB Inhale 1 puff into the lungs daily 2/6/20 3/7/20  Mike Melara MD   OXYGEN Inhale 2 L into the lungs daily as needed    Historical Provider, MD   HYDROcodone-acetaminophen (NORCO) 5-325 MG per tablet Take 1 tablet by mouth every 6 hours as needed for Pain.     Historical Provider, MD   albuterol sulfate  (90 Base) MCG/ACT inhaler Inhale 2 puffs into the lungs every 6 hours as needed for Wheezing 1/9/20 2/19/20  Mike Melara MD   atorvastatin (LIPITOR) 80 MG tablet daily  12/6/19   Historical Provider, MD   carvedilol (COREG) 25 MG tablet TAKE ONE TABLET BY MOUTH TWICE A DAY WITH MEALS 11/8/19   Zhane Medhkour, DO   SYMBICORT 160-4.5 MCG/ACT AERO INHALE 1 PUFF BY MOUTH AS DIRECTED TWO TIMES A DAY 11/1/19   Peggi Shown, Reason for visit:       Chief Complaint   Patient presents with    Follow-up       review status of disease    Fatigue      Current problems/ Active and recent treatments:  Low-grade follicular lymphoma, OB15 positive, early stage I/II  Adenocarcinoma of left lung  COPD  Lipomatous hypertrophy of inter-atrial septum     Interval history:      Patient presents to the clinic for a follow-up visit and to discuss further recommendations and treatment plan. Since last office visit patient was seen by medical oncology in [facility] clinic. Patient denies any new complaint or interval event Repeat CT scan show slight increase in size of the lingular mass which now measures 4.4 cm. Cluster of aortopulmonary lymph nodes have also slightly increased in size. Patient neck mass is not palpable anymore. Weight is stable. Denies any night sweats. During this visit patient's allergy, social, medical, surgical history and medications were reviewed and updated. Summary of Case/History:     Victor Manuel Ballesteros a 77 y. o.female is a presents to the clinic to establish care and for further evaluation treatment recommendation. Patient initially noted a lump below her right ear back in April 2019. As the lump grew patient seek medical attention and was referred to ENT for further evaluation. Patient underwent FNA on 10/30/2019 which came back consistent with mature B-cell neoplasm expressing CD10, consistent with low-grade follicular lymphoma. Patient feels that the lump has become much smaller but is now starting to become painful. Patient does complain of fluctuation in her weight. She initially lost about 10 pounds but was able to regain it. Patient does complain of night sweats. Denies any excessive fatigue fevers. Denies any other swollen glands. Her blood work-up done back in September did not show any cytopenias. Patient denies any difficulty swallowing. Denies any hearing loss.   She is up-to-date on education level: None   Occupational History    None   Social Needs    Financial resource strain: None    Food insecurity:       Worry: None       Inability: None    Transportation needs:       Medical: None       Non-medical: None   Tobacco Use    Smoking status: Former Smoker       Packs/day: 0.25       Years: 55.00       Pack years: 13.75       Types: Cigarettes       Start date: 1963       Last attempt to quit: 2019       Years since quittin.1    Smokeless tobacco: Never Used   Substance and Sexual Activity    Alcohol use: No    Drug use: Yes       Types: Cocaine, Marijuana       Comment: cocaine clean date; May, 2001. Patient smokes Marijuana currently prn for back pain    Sexual activity: Not Currently   Lifestyle    Physical activity:       Days per week: None       Minutes per session: None    Stress: None   Relationships    Social connections:       Talks on phone: None       Gets together: None       Attends Buddhist service: None       Active member of club or organization: None       Attends meetings of clubs or organizations: None       Relationship status: None    Intimate partner violence:       Fear of current or ex partner: None       Emotionally abused: None       Physically abused: None       Forced sexual activity: None   Other Topics Concern    None   Social History Narrative    None         Family History         Family History   Problem Relation Age of Onset    Heart Disease Mother      High Blood Pressure Mother      Cancer Mother           cervix    Heart Disease Father      High Blood Pressure Father      Stroke Father      Cancer Sister           cervix    Stroke Sister      Cancer Paternal Grandfather           lung         Current Medications:     Current Facility-Administered Medications          Current Outpatient Medications   Medication Sig Dispense Refill    lidocaine-prilocaine (EMLA) 2.5-2.5 % cream Apply topically as needed.  1 Tube 2    negative for chest pain, dyspnea, palpitations, orthopnea, PND   Gastrointestinal: negative for nausea, vomiting, diarrhea, constipation, abdominal pain, Dysphagia, hematemesis and hematochezia   Genitourinary: negative for frequency, dysuria, nocturia, urinary incontinence, and hematuria   Integument: negative for rash, skin lesions, bruises. Hematologic/Lymphatic: negative for easy bruising, bleeding, lymphadenopathy, or petechiae   Endocrine: negative for heat or cold intolerance,weight changes, change in bowel habits and hair loss   Musculoskeletal: negative for myalgias, arthralgias, pain, joint swelling,and bone pain   Neurological: negative for headaches, dizziness, seizures, weakness, numbness        Physical Exam:     Vitals: BP (!) 168/84 Comment: per pt, this is a good reading for her.   Pulse 73   Temp 98.5 °F (36.9 °C) (Oral)   Resp 20   Wt 187 lb 14.4 oz (85.2 kg)   BMI 32.25 kg/m²   General appearance - well appearing, no in pain or distress  Mental status - AAO X3  Eyes - pupils equal and reactive, extraocular eye movements intact  Mouth - mucous membranes moist, pharynx normal without lesions  Neck -positive right cervical lymphadenopathy, decrease in size  Lymphatics - no palpable lymphadenopathy, no hepatosplenomegaly  Chest -decreased breathing sounds bilaterally - stable over previous  Heart - normal rate, regular rhythm, normal S1, S2, no murmurs  Abdomen - soft, nontender, nondistended, no masses or organomegaly  Neurological - alert, oriented, normal speech, no focal findings or movement disorder noted  Extremities - peripheral pulses normal, no pedal edema, no clubbing or cyanosis  Skin - normal coloration and turgor, no rashes, no suspicious skin lesions noted         DATA:        Lab Results   Component Value Date     WBC 7.5 12/05/2019     HGB 15.8 (H) 12/05/2019     HCT 49.2 (H) 12/05/2019     MCV 84.5 12/05/2019      12/17/2019   '    Chemistry               Component Value patient repeat CT scan which show increase in size of the lymph nodes in the mediastinum as well as lung mass. On examination patient neck mass has greatly decreased in size. I also reviewed recommendation from [facility]. I discussed patient's case over the phone with patient's oncologist from [facility]. I also discussed patient's case with patient radiation oncologist.  Discussed possibility of doing a mediastinoscopy. Patient is agreeable if it can be done urgently. She expresses frustration about not getting started on treatment and the size of the tumor getting bigger. Unfortunately due to patient's insurance she cannot be seen by the cardiothoracic surgeons in town other than [facility]. Patient does not want to go to [facility] for surgical procedure as it will likely delay treatment. We also contacted cardiothoracic surgeon at [facility] however the at this time are not taking thoracic cancer cases. I called the patient over the phone and discussed this. After detailed discussion we decided to proceed with treatment using concurrent chemoradiation. We will refer patient for port placement. Urbano Curry          I spent more than 40 minutes examining, evaluating, reviewing data, counseling the patient and coordinating care. Greater than 50% of time was spent face-to-face with the patient this note is created with the assistance of a speech recognition program.  While intending to generate a document that actually reflects the content of the visit, the document can still have some errors including those of syntax and sound a like substitutions which may escape proof reading. It such instances, actual meaning can be extrapolated by contextual diversion.

## 2020-02-24 NOTE — TELEPHONE ENCOUNTER
communicated with Patient by telephone.     Electronically signed by Miroslava Crystal, Oncology Outpatient [facility], [id number] [facility] Radiation Oncology  2/24/2020  9:44 AM

## 2020-02-24 NOTE — FLOWSHEET NOTE
Situation:  Writer received Patient's biopsychosocial form which indicated that she had distress in the area of spiritual concerns. Writer telephoned Patient. Assessment:  Ms. Eleni Ho answered her phone. She shared that she was waiting on a ride to [address] to get her port placed. She will receive treatment at [facility]. \"    Ms. Eleni Ho was receptive to writer visiting her during treatment. Writer assessed that Pt was focused on waiting for her ride and not in a place to engage in conversation. Intervention:  Writer provided supportive presence and explored Pt's coping and needs; offered words of encouragement; told Pt she is available for support and will visit Patient when she begins treatment. Outcome:  Ms. Pato Lo for the call. 02/24/20 0944   Encounter Summary   Services provided to: Patient   Referral/Consult From: Multi-disciplinary team   Support System Unknown   Continue Visiting   (2/24/20)   Complexity of Encounter Low   Length of Encounter 15 minutes   Routine   Type Initial   Assessment Calm   Intervention Sustaining presence/ Ministry of presence; Explored feelings, thoughts, concerns   Outcome Expressed gratitude     Electronically signed by Dennys Braxton, Oncology Outpatient [address], [address] [facility] Radiation Oncology  2/24/2020  9:46 AM

## 2020-02-25 NOTE — TELEPHONE ENCOUNTER
Name: Francesco Hein  : 1955  MRN: [id number]    Oncology Navigation Follow-Up Note    Contact Type:  Telephone  Notes: Spoke with Dr. Melvi Austin r/t XRT start. Dr. Melvi Austin stated at latest pt to start 2020. Spoke with Dr. Sanjiv Mcelroy updated on conversation with Dr. Melvi Austin. Dr. Sanjiv Mcelroy stated okay to proceed with C1 tomorrow. Spoke with pt & confirmed 0800 chemotherapy tomorrow. Instructed pt may contact writer prn. Will continue to follow.     Electronically signed by Noble Easley RN on 2020 at 4:03 PM

## 2020-02-29 NOTE — TELEPHONE ENCOUNTER
Called pt regarding her elevated blood pressure reads sent from cancer center, and I recommended we start a new bp medication. States it does run higher than 140/90s when she checks it but never as high as when she is at cancer center, thinks may be related to stress. I did advise adding a new medication and for her to monitor it and follow up in one month.

## 2020-03-04 NOTE — TELEPHONE ENCOUNTER
Name: Amaya Padron  : 1955  MRN: [id number]    Oncology Navigation Follow-Up Note    Contact Type:  Telephone  Notes: Pt scheduled for Dr. Tor Shelby [phone] f/u today. Jihan Carver, [facility], alerted writer pt completed XRT this am & left. Spoke with pt via telephone, notified need to complete Dr. Tor Shelby f/u today r/t recent concurrent tx start. Pt stated currently out eating lunch. Instructed pt may come directly to [facility] after lunch, pt agreeable. Hanh wilburn. Will continue to follow.       Electronically signed by Madelyn Valenzuela RN on 3/4/2020 at 1:23 PM

## 2020-03-04 NOTE — PROGRESS NOTES
Patient arrived for [address] collected and reviewed   Tolerated treatment without incident   Pt ambulated to exit in stable condition   RO tx and MD visit to follow today   Basia Sutherland RN

## 2020-03-04 NOTE — PROGRESS NOTES
Victor Manuel Ballesteros                                                                                                                  3/4/2020  MRN:   [id number]  YOB: 1955  PCP:                           Gala Reddy DO  Referring Physician: No ref. provider found  Treating Physician Name: Monique Donnelly MD      Reason for visit:  Chief Complaint   Patient presents with    Follow-up     Review status of disease     Current problems/ Active and recent treatments:  Low-grade follicular lymphoma, QC04 positive, early stage I/II  Adenocarcinoma of left lung  COPD  Lipomatous hypertrophy of inter-atrial septum    Interval history:     Patient presents to the clinic for a follow-up visit for lung cancer and toxicity check. She reports she has started feeling some fatigue with treatment. She denies any GERD or chest pain. The mass behind her right ear is resolved, some point tenderness remains. Her night sweats persist unchanged. During this visit patient's allergy, social, medical, surgical history and medications were reviewed and updated. Summary of Case/History:    Victor Manuel Ballesteros a 77 y. o.female is a presents to the clinic to establish care and for further evaluation treatment recommendation. Patient initially noted a lump below her right ear back in April 2019. As the lump grew patient seek medical attention and was referred to ENT for further evaluation. Patient underwent FNA on 10/30/2019 which came back consistent with mature B-cell neoplasm expressing CD10, consistent with low-grade follicular lymphoma. Patient feels that the lump has become much smaller but is now starting to become painful. Patient does complain of fluctuation in her weight. She initially lost about 10 pounds but was able to regain it. Patient does complain of night sweats. Denies any excessive fatigue fevers. Denies any other swollen glands.   Her blood work-up done back in September did not show any cytopenias. Patient denies any difficulty swallowing. Denies any hearing loss. She is up-to-date on mammograms. Past Medical History:   Past Medical History:   Diagnosis Date    Arthritis     CAD (coronary artery disease)     fatty pockets in the atrium, states \"30% beating\"    Cancer ([facility] [address]     follicular lymphoma- 52/6878, lung     Chest discomfort     CHF (congestive heart failure) (HCC)     Chronic back pain     \"pinched columns\", smokes marijuana prn for back pain    COPD (chronic obstructive pulmonary disease) (HCC)     Cough     Depression     History of hepatitis age 21    Hx of blood clots     Hyperlipidemia     Hypertension     Dr. Briceño [place] Mediastinal lymphadenopathy     On home O2     all the time    SOB (shortness of breath)     Wears dentures     upper,does not wear lower    Wears glasses        Past Surgical History:     Past Surgical History:   Procedure Laterality Date    ANKLE SURGERY Right     broken ankle with hardware inserted and later removed    APPENDECTOMY      BONE MARROW BIOPSY Left 12/05/2019    pelvic, benign    BRONCHOSCOPY N/A 1/28/2020    EBUS WITH STATION 4L LYMPH NODE FNA performed by Ignacio Fischer MD at [address] [facility]      cardiac cath, no stents    FINE NEEDLE ASPIRATION Right 11/2019    lymph nodes, cancer found, follicular    HYSTERECTOMY, TOTAL ABDOMINAL  1982    with BSO    OTHER SURGICAL HISTORY      blood clot extraction from under bladder, angiogram to remove bloot clot and inserted \"something to keep the vein open\" ?stent?  OTHER SURGICAL HISTORY  01/28/2020    ENDOBRONCHIAL ULTRASOUND WITH STATION 4L LYMPH NODE FNA    SHOULDER SURGERY Right     rotator cuff    TONSILLECTOMY         Patient Family Social History:     Social History     Socioeconomic History    Marital status:       Spouse name: None    Number of children: None    Years of education: None    Highest education level: None   Occupational History  None   Social Needs    Financial resource strain: None    Food insecurity:     Worry: None     Inability: None    Transportation needs:     Medical: None     Non-medical: None   Tobacco Use    Smoking status: Former Smoker     Packs/day: 0.25     Years: 55.00     Pack years: 13.75     Types: Cigarettes     Start date: 1963     Last attempt to quit: 2019     Years since quittin.1    Smokeless tobacco: Never Used   Substance and Sexual Activity    Alcohol use: No    Drug use: Yes     Types: Cocaine, Marijuana     Comment: cocaine clean date; May, 2001. Patient smokes Marijuana currently prn for back pain    Sexual activity: Not Currently   Lifestyle    Physical activity:     Days per week: None     Minutes per session: None    Stress: None   Relationships    Social connections:     Talks on phone: None     Gets together: None     Attends Faith service: None     Active member of club or organization: None     Attends meetings of clubs or organizations: None     Relationship status: None    Intimate partner violence:     Fear of current or ex partner: None     Emotionally abused: None     Physically abused: None     Forced sexual activity: None   Other Topics Concern    None   Social History Narrative    None     Family History   Problem Relation Age of Onset    Heart Disease Mother     High Blood Pressure Mother     Cancer Mother         cervix    Heart Disease Father     High Blood Pressure Father     Stroke Father     Cancer Sister         cervix    Stroke Sister     Cancer Paternal Grandfather         lung     Current Medications:     Current Outpatient Medications   Medication Sig Dispense Refill    losartan (COZAAR) 50 MG tablet Take 1 tablet by mouth daily 30 tablet 5    lidocaine-prilocaine (EMLA) 2.5-2.5 % cream Apply topically as needed.  1 Tube 2    ondansetron (ZOFRAN-ODT) 8 MG TBDP disintegrating tablet Place 1 tablet under the tongue 3 times daily as needed for hazy airspace densities and reticular thickening appears slightly more prominent. There is accompanying subsegmental atelectasis inferiorly in the lingula. No pleural effusion or pneumothorax. Upper Abdomen: Partially imaged left renal cyst.  There is 8 mm anterior subcapsular segment hypodensity in the left lobe of liver segment 4 A likely cyst. Soft Tissues/Bones: No acute abnormality of the bones. The superficial soft tissues show no significant abnormalities. 1. Known FDG avid masslike consolidation in the lingula along the major fissure with some surrounding patchy airspace disease measures approximately 1.9 x 4.4 cm, previously 1.6 x 4.0 cm. 2. Cluster of aortopulmonary window lymph nodes some of which were FDG avid again noted. Largest 11 mm in short axis. This measured 9 mm on the PET-CT. 3. Partially imaged left renal cyst and 8 mm benign-appearing probable cyst in the anterior subcapsular left lobe of liver. Ir Port Placement > 5 Years    Result Date: 2/24/2020  EXAMINATION: PROCEDURES PERFORMED: 2/24/2020. 1. ULTRASOUND GUIDED ACCESS. 2. FLUOROSCOPY GUIDED PLACEMENT OF A TUNNELED MEDIPORT. 3. CONSCIOUS SEDATION MONITORING X 35 MINUTES. HISTORY: 25-year-old female with lymphoma presents for MediPort placement for treatment. PHYSICIANS: Jake Tapia MD MEDICATIONS: Versed 1 mg IV, Fentanyl 50 mcg IV was used for conscious sedation monitored under my direction. Total intraservice time of sedation was 35 minutes. The patient's vital signs were monitored throughout the procedure and recorded in the patient's medical record by the nurse. CONTRAST 0 ml. FLUOROSCOPY TIME: 1 image, 0.4 minutes, dose area product 1110 mGy per cm squared. DESCRIPTION OF PROCEDURE: The patient received a detailed explanation regarding the procedure, risks, benefits and alternatives and then signed informed consent. Universal protocol and time-out was observed prior to the procedure.  Maximum sterile barrier technique side-effects of ongoing treatment  I will start patient on Prilosec. Patient continues to have night sweats which is likely related to follicular lymphoma. We will continue to monitor. Pain is controlled. Patient was counseled on use of pain medication and antiemetics. Labs are adequate for concurrent chemoradiation. NCCN guidelines were reviewed and discussed with the patient. The diagnosis and care plan were discussed with the patient in detail. I discussed the natural history of the disease, prognosis, risks and goals of therapy and answered all the patients questions to the best of my ability. Patient expressed understanding and was in agreement. Yuriy Vallejo        This note is created with the assistance of a speech recognition program.  While intending to generate a document that actually reflects the content of the visit, the document can still have some errors including those of syntax and sound a like substitutions which may escape proof reading. It such instances, actual meaning can be extrapolated by contextual diversion. Sami Gardner

## 2020-03-06 NOTE — PROGRESS NOTES
[address] PRIMARY CARE  [address] [facility] [address]  [address] [address]  Dept: [phone]  Dept Fax: [phone]    Umesh Chamorro is a 77 y.o. female who presents today for her medical conditions/complaints as noted below. Umesh Chamorro is c/o of  Chief Complaint   Patient presents with    Hypertension     running very high the past few days    Discuss Medications     taking medication help throat pain with chemo, unknown name         HPI:     HPI     Pt here regarding HTN. Pt BP has been high at [facility], pt was started on losartan 50 mg recently but BP has still been high. States has lots of other stressors at home as well, but feels her mood is positive and has been seeing her psychiatrist regularly. Pain in lower back has gotten worse over time, gets worse when laying on flat bed at [facility]. Takes pain medication states usually once a day. Hx of bulging discs after back injury . Had PET scan in November showed degenerative changes of spine. Last xray in 2019.          Hemoglobin A1C (%)   Date Value   2020 5.4             ( goal A1C is < 7)   No results found for: LABMICR  LDL Cholesterol (mg/dL)   Date Value   2019 139 (H)     LDL Calculated (mg/dL)   Date Value   2018 136       (goal LDL is <100)   AST (U/L)   Date Value   2020 17     ALT (U/L)   Date Value   2020 16     BUN (mg/dL)   Date Value   2020 18     BP Readings from Last 3 Encounters:   20 (!) 198/112   20 (!) 189/121   20 (!) 153/99          (goal 120/80)    Past Medical History:   Diagnosis Date    Arthritis     CAD (coronary artery disease)     fatty pockets in the atrium, states \"30% beating\"    Cancer ([facility] [address]     follicular lymphoma- 4870, lung     Chest discomfort     CHF (congestive heart failure) (HCC)     Chronic back pain     \"pinched columns\", smokes marijuana prn for back pain    COPD (chronic obstructive FIT/FOBT  07/17/2020    Annual Wellness Visit (AWV)  09/04/2020    Lipid screen  09/17/2020    Breast cancer screen  02/26/2021    Potassium monitoring  03/04/2021    Creatinine monitoring  03/04/2021    Diabetes screen  01/06/2023    DEXA (modify frequency per FRAX score)  Completed    Flu vaccine  Completed    Pneumococcal 65+ yrs at Risk Vaccine  Completed    Hepatitis C screen  Completed    Hepatitis A vaccine  Aged Out    Hepatitis B vaccine  Aged Out    Hib vaccine  Aged Out    Meningococcal (ACWY) vaccine  Aged Out       Subjective:      Review of Systems   Constitutional: Negative for appetite change, chills and fever. HENT: Negative for sore throat. Eyes: Negative for visual disturbance. Cardiovascular: Negative for chest pain. Gastrointestinal: Negative for constipation, nausea and vomiting. Genitourinary:        No bowel or bladder incontinence, or saddle anesthesia   Musculoskeletal: Positive for back pain. Neurological:        Mild headaches, not much today       Objective:     Physical Exam  Constitutional:       Appearance: She is well-developed. HENT:      Head: Normocephalic and atraumatic. Mouth/Throat:      Mouth: Mucous membranes are moist.      Pharynx: Oropharynx is clear. Eyes:      Conjunctiva/sclera: Conjunctivae normal.      Pupils: Pupils are equal, round, and reactive to light. Neck:      Musculoskeletal: Normal range of motion and neck supple. Cardiovascular:      Rate and Rhythm: Normal rate and regular rhythm. Heart sounds: Normal heart sounds. Pulmonary:      Effort: Pulmonary effort is normal.      Breath sounds: Normal breath sounds. Musculoskeletal:      Comments: ttp lower lumbar paraspinal    Skin:     General: Skin is warm and dry. Neurological:      Mental Status: She is alert and oriented to person, place, and time. Psychiatric:         Behavior: Behavior normal.         Thought Content:  Thought content normal.       BP (!) 198/112 (Site: Left Upper Arm, Position: Sitting, Cuff Size: Medium Adult)   Pulse 67   Resp 18   Ht 5' 4\" (1.626 m)   Wt 184 lb (83.5 kg)   SpO2 98%   Breastfeeding No   BMI 31.58 kg/m²     Assessment:      1. Essential hypertension  - states has been taking amlodipine/ carvedilol , and also has been taking losartan 50 mg. Will increase losartan to 100mg and make it combination with htz 12.5, given BP is very elevated. Monitor BP, follow up in one month. Recheck- systolic in 102R    2. Chronic bilateral low back pain without sciatica  - ttp of lower back, recommend taking tylenol as well to help with pain and also try lidoderm patch.   - XR LUMBAR SPINE (2-3 VIEWS); Future    3. Adenocarcinoma of left lung ([address]  - will recheck xray of lumbar spine, as it has gotten worse over time. - following with heme onc for her lung cancer/lymphoma.   - XR LUMBAR SPINE (2-3 VIEWS); Future       Plan:      Return in about 1 month (around 4/6/2020) for follow up. Orders Placed This Encounter   Procedures    XR LUMBAR SPINE (2-3 VIEWS)     Standing Status:   Future     Standing Expiration Date:   3/6/2021     Order Specific Question:   Reason for exam:     Answer:   low back pain, worsening past few months. Lung cancer     Orders Placed This Encounter   Medications    lidocaine 4 % external patch     Sig: Place 1 patch onto the skin daily     Dispense:  30 patch     Refill:  0    losartan-hydrochlorothiazide (HYZAAR) 100-12.5 MG per tablet     Sig: Take 1 tablet by mouth daily     Dispense:  30 tablet     Refill:  5        Patient given educational materials - see patient instructions. Discussed use, benefit, and side effects of prescribed medications. All patient questions answered. Pt voiced understanding. Reviewed healthmaintenance. Instructed to continue current medications, diet and exercise. Patient agreed with treatment plan. Follow up as directed.      Electronically signed by Ty Hernandez DO on 3/6/2020 at 4:05 PM

## 2020-03-11 NOTE — TELEPHONE ENCOUNTER
Pt was seen today by Dr. David Mcginnis during tx. AVS not completed/writer spoke to MD/Per MD, pt is scheduled for f/u on 03-18-20 to be seen at the start of tx at 0800.

## 2020-03-11 NOTE — PROGRESS NOTES
[address]            Radiation Oncology          [address] [facility] [place]          [address]        [employer] [place]: [phone]        F: [phone]       [email or web address]         Date of Service: 3/11/2020      Location:  [address],   [address] [facility] [place] [place]ora [place]s   [phone]     RADIATION ONCOLOGY WEEKLY PROGRESS NOTE    Patient ID:   Mir Medina  : 1955   MRN: [id number]    DIAGNOSIS:  Stage IIIA [id number] non-small adenocarcinoma of the left upper lobe lung     RADIATION THERAPY COURSE:   Treatment Site: left lung and mediastinal LN   Actual Dose: 2 000cGy  Total Planned Dose: 6000cGy  Treatment technique: IMRT  Therapy imaging monitoring: CBCT daily  Concurrent Chemotherapy: Yes weekly Carbo/Taxol     SUBJECTIVE:   Patient is seen today for weekly on treatment evaluation. She reports mild pain with swallowing. She is doing well otherwise with no skin irritation, chest pain, SOB, coughing, or fatigue. OBJECTIVE:   ECO Symptomatic but completely ambulatory    VITAL SIGNS: BP (!) 151/87   Pulse 80   Temp 98.1 °F (36.7 °C)   Wt 188 lb 4.8 oz (85.4 kg)   SpO2 98%   BMI 32.32 kg/m²    Wt Readings from Last 5 Encounters:   20 188 lb 4.8 oz (85.4 kg)   20 188 lb 3.2 oz (85.4 kg)   20 188 lb 3.2 oz (85.4 kg)   20 184 lb (83.5 kg)   20 185 lb 9.6 oz (84.2 kg)     GENERAL:  General appearance is that of a well-nourished, well-developed in no apparent distress. SKIN: No erythema or desquamation.     LABS:  WBC   Date Value Ref Range Status   2020 4.5 3.5 - 11.0 k/uL Final     Segs Absolute   Date Value Ref Range Status   2020 4.09 1.8 - 7.7 k/uL Final     Hemoglobin   Date Value Ref Range Status   2020 12.9 12.0 - 16.0 g/dL Final     Platelets   Date Value Ref Range Status   2020 179 140 - 450 k/uL Final       MEDICATIONS:    Current Outpatient Medications:   [facility]

## 2020-03-11 NOTE — FLOWSHEET NOTE
Situation:  Ana Santiago RN, gave referral to Writer to visit Patient. Writer had spoken with Ms. Neptali Gonzales briefly on the phone last month. Assessment:  Ms. Neptali Gonzales smiled and greeted writer. She remembered talking to her. Ms. Neptali Gonzales engaged in conversation. She talked about the people who support her, including her psychiatrist, counselor, and doctors. She expressed gratitude for her oncologist and how he listens to her. Ms. Neptali Gonzales accessed her sense of humor as she shared stories and memories from her life. She named some of the challenges and told writer there were many more. She gave examples of how her curt and relationship with God have helped her. She agreed that her curt is strong. When she got her diagnosis, she shared that she gave it to God. She has asked God to help her throughout her life and has received help. Intervention:  Writer provided supportive presence and explored Pt's coping and needs; inquired about Pt's sources of support and strength; facilitated story telling; affirmed Pt's strengths; offered words of encouragement and support; gave Pt a copy of \"Guideposts. \"    Outcome:  Ms. Neil Elena for the visit.        03/11/20 0958   Encounter Summary   Services provided to: Patient   Referral/Consult From: Nurse   Support System Family members;Friends/neighbors   Continue Visiting   (3/11/20)   Complexity of Encounter Low   Length of Encounter 15 minutes   Spiritual Assessment Completed Yes   Spiritual/Hinduism   Type Spiritual support   Assessment Calm; Approachable; Hopeful;Coping   Intervention Active listening;Explored feelings, thoughts, concerns;Explored coping resources;Provided reading materials/devotional materials;Sustaining presence/ Ministry of presence; Discussed meaning/purpose;Discussed relationship with God;Discussed belief system/Episcopalian practices/curt;Discussed illness/injury and it's impact   Outcome Receptive; Hopeful;Encouraged;Coping; Shared reminiscences;Engaged in conversation;Expressed gratitude     Electronically signed by Justyn Rivers, [address], [address] [facility] Radiation Oncology  3/11/2020  10:00 AM

## 2020-03-11 NOTE — PLAN OF CARE
Problem: Safety:  Goal: Free from accidental physical injury  Description: Free from accidental physical injury  Outcome: Met This Shift

## 2020-03-11 NOTE — PROGRESS NOTES
Stefan Heranndez                                                                                                                  3/11/2020  MRN:   [id number]  YOB: 1955  PCP:                           Ty Hernandez DO  Referring Physician: No ref. provider found  Treating Physician Name: Sameer Clarke MD      Reason for visit:  Toxicity check. Discussed treatment plan. Current problems/ Active and recent treatments:  Low-grade follicular lymphoma, NH06 positive, early stage I/II  Adenocarcinoma of left lung, locally advanced  COPD  Lipomatous hypertrophy of inter-atrial septum    Interval history:     Patient presents to the clinic for a follow-up visit for lung cancer, cycle #3, and toxicity check. She feels she is tolerating treatment well but notes pain with swallowing has started, she is managing with Motrin. Her cough and night sweats are unchanged. She has occasional palpable cervical lymph nodes. Denies fever chills. Denies hospitalization or ER visit. During this visit patient's allergy, social, medical, surgical history and medications were reviewed and updated. Summary of Case/History:    Stefan Hernandez a 77 y. o.female is a presents to the clinic to establish care and for further evaluation treatment recommendation. Patient initially noted a lump below her right ear back in April 2019. As the lump grew patient seek medical attention and was referred to ENT for further evaluation. Patient underwent FNA on 10/30/2019 which came back consistent with mature B-cell neoplasm expressing CD10, consistent with low-grade follicular lymphoma. Patient feels that the lump has become much smaller but is now starting to become painful. Patient does complain of fluctuation in her weight. She initially lost about 10 pounds but was able to regain it. Patient does complain of night sweats. Denies any excessive fatigue fevers. Denies any other swollen glands.   Her blood work-up Take 1 capsule by mouth daily 30 capsule 3    lidocaine-prilocaine (EMLA) 2.5-2.5 % cream Apply topically as needed. 1 Tube 2    ondansetron (ZOFRAN-ODT) 8 MG TBDP disintegrating tablet Place 1 tablet under the tongue 3 times daily as needed for Nausea or Vomiting 90 tablet 2    OXYGEN Inhale 2 L into the lungs daily as needed      HYDROcodone-acetaminophen (NORCO) 5-325 MG per tablet Take 1 tablet by mouth every 6 hours as needed for Pain.  albuterol sulfate  (90 Base) MCG/ACT inhaler Inhale 2 puffs into the lungs every 6 hours as needed for Wheezing 1 Inhaler 6    atorvastatin (LIPITOR) 80 MG tablet daily       carvedilol (COREG) 25 MG tablet TAKE ONE TABLET BY MOUTH TWICE A DAY WITH MEALS 60 tablet 3    SYMBICORT 160-4.5 MCG/ACT AERO INHALE 1 PUFF BY MOUTH AS DIRECTED TWO TIMES A DAY 10.2 g 2    traZODone (DESYREL) 50 MG tablet TAKE ONE TABLET BY MOUTH DAILY AT BEDTIME 30 tablet 5    sertraline (ZOLOFT) 100 MG tablet Take 1.5 tablets by mouth daily (Patient taking differently: Take 200 mg by mouth daily ) 30 tablet 5    clopidogrel (PLAVIX) 75 MG tablet Take 75 mg by mouth daily      aspirin 81 MG tablet Take 81 mg by mouth daily      amLODIPine (NORVASC) 10 MG tablet TAKE ONE TABLET BY MOUTH EVERY DAY 90 tablet 1    buPROPion (WELLBUTRIN SR) 150 MG extended release tablet Take 1 tablet by mouth daily 60 tablet 3    losartan (COZAAR) 50 MG tablet Take 1 tablet by mouth daily 30 tablet 5     No current facility-administered medications for this visit.       Facility-Administered Medications Ordered in Other Visits   Medication Dose Route Frequency Provider Last Rate Last Dose    sodium chloride flush 0.9 % injection 10 mL  10 mL Intravenous PRN Ayah Oden MD   10 mL at 03/11/20 0826    heparin flush 100 UNIT/ML injection 500 Units  500 Units Intracatheter PRN Ayah Oden MD        0.9 % sodium chloride infusion  20 mL/hr Intravenous Once Ayah Oden MD 20 mL/hr at 03/11/20

## 2020-03-11 NOTE — PROGRESS NOTES
Pt here for C3D1 Taxol and Carbo concurrent with radiation. Denies any new complaints. Labs drawn from port and results reviewed. Pt seen by Dr Maryse Nino at chairside for follow up, refer to his note. Pt was treated without incident and d/c'd in stable condition. Pt will return on 3-18-20 for C4D1.

## 2020-03-18 NOTE — PROGRESS NOTES
pounds but was able to regain it. Patient does complain of night sweats. Denies any excessive fatigue fevers. Denies any other swollen glands. Her blood work-up done back in September did not show any cytopenias. Patient denies any difficulty swallowing. Denies any hearing loss. She is up-to-date on mammograms. 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 Lorrie Hammer Mediastinal lymphadenopathy     On home O2     all the time    SOB (shortness of breath)     Wears dentures     upper,does not wear lower    Wears glasses        Past Surgical History:     Past Surgical History:   Procedure Laterality Date    ANKLE SURGERY Right     broken ankle with hardware inserted and later removed    APPENDECTOMY      BONE MARROW BIOPSY Left 12/05/2019    pelvic, benign    BRONCHOSCOPY N/A 1/28/2020    EBUS WITH STATION 4L LYMPH NODE FNA performed by Mac Gregory MD at [address] [facility]      cardiac cath, no stents    FINE NEEDLE ASPIRATION Right 11/2019    lymph nodes, cancer found, follicular    HYSTERECTOMY, TOTAL ABDOMINAL  1982    with BSO    OTHER SURGICAL HISTORY      blood clot extraction from under bladder, angiogram to remove bloot clot and inserted \"something to keep the vein open\" ?stent?     OTHER SURGICAL HISTORY  01/28/2020    ENDOBRONCHIAL ULTRASOUND WITH STATION 4L LYMPH NODE FNA    SHOULDER SURGERY Right     rotator cuff    TONSILLECTOMY         Patient Family Social History: Social History     Socioeconomic History    Marital status:      Spouse name: None    Number of children: None    Years of education: None    Highest education level: None   Occupational History    None   Social Needs    Financial resource strain: None    Food insecurity     Worry: None     Inability: None    Transportation needs     Medical: None     Non-medical: None   Tobacco Use    Smoking status: Former Smoker     Packs/day: 0.25     Years: 55.00     Pack years: 13.75     Types: Cigarettes     Start date: 1963     Last attempt to quit: 2019     Years since quittin.2    Smokeless tobacco: Never Used   Substance and Sexual Activity    Alcohol use: No    Drug use: Yes     Types: Cocaine, Marijuana     Comment: cocaine clean date; May, 2001.   Patient smokes Marijuana currently prn for back pain    Sexual activity: Not Currently   Lifestyle    Physical activity     Days per week: None     Minutes per session: None    Stress: None   Relationships    Social connections     Talks on phone: None     Gets together: None     Attends Rastafarian service: None     Active member of club or organization: None     Attends meetings of clubs or organizations: None     Relationship status: None    Intimate partner violence     Fear of current or ex partner: None     Emotionally abused: None     Physically abused: None     Forced sexual activity: None   Other Topics Concern    None   Social History Narrative    None     Family History   Problem Relation Age of Onset    Heart Disease Mother     High Blood Pressure Mother     Cancer Mother         cervix    Heart Disease Father     High Blood Pressure Father     Stroke Father     Cancer Sister         cervix    Stroke Sister     Cancer Paternal Grandfather         lung     Current Medications:     Current Outpatient Medications   Medication Sig Dispense Refill    lidocaine 4 % external patch Place 1 patch onto the skin daily 30 patch 0  losartan-hydrochlorothiazide (HYZAAR) 100-12.5 MG per tablet Take 1 tablet by mouth daily 30 tablet 5    omeprazole (PRILOSEC) 20 MG delayed release capsule Take 1 capsule by mouth daily 30 capsule 3    losartan (COZAAR) 50 MG tablet Take 1 tablet by mouth daily 30 tablet 5    lidocaine-prilocaine (EMLA) 2.5-2.5 % cream Apply topically as needed. 1 Tube 2    ondansetron (ZOFRAN-ODT) 8 MG TBDP disintegrating tablet Place 1 tablet under the tongue 3 times daily as needed for Nausea or Vomiting 90 tablet 2    OXYGEN Inhale 2 L into the lungs daily as needed      HYDROcodone-acetaminophen (NORCO) 5-325 MG per tablet Take 1 tablet by mouth every 6 hours as needed for Pain.  albuterol sulfate  (90 Base) MCG/ACT inhaler Inhale 2 puffs into the lungs every 6 hours as needed for Wheezing 1 Inhaler 6    atorvastatin (LIPITOR) 80 MG tablet daily       carvedilol (COREG) 25 MG tablet TAKE ONE TABLET BY MOUTH TWICE A DAY WITH MEALS 60 tablet 3    SYMBICORT 160-4.5 MCG/ACT AERO INHALE 1 PUFF BY MOUTH AS DIRECTED TWO TIMES A DAY 10.2 g 2    traZODone (DESYREL) 50 MG tablet TAKE ONE TABLET BY MOUTH DAILY AT BEDTIME 30 tablet 5    sertraline (ZOLOFT) 100 MG tablet Take 1.5 tablets by mouth daily (Patient taking differently: Take 200 mg by mouth daily ) 30 tablet 5    clopidogrel (PLAVIX) 75 MG tablet Take 75 mg by mouth daily      aspirin 81 MG tablet Take 81 mg by mouth daily      amLODIPine (NORVASC) 10 MG tablet TAKE ONE TABLET BY MOUTH EVERY DAY 90 tablet 1    buPROPion (WELLBUTRIN SR) 150 MG extended release tablet Take 1 tablet by mouth daily 60 tablet 3     No current facility-administered medications for this visit.       Facility-Administered Medications Ordered in Other Visits   Medication Dose Route Frequency Provider Last Rate Last Dose    sodium chloride flush 0.9 % injection 10 mL  10 mL Intravenous PRN Charito New MD   10 mL at 03/18/20 0830       Allergies:   Patient has no known

## 2020-03-18 NOTE — PROGRESS NOTES
Patient here for c4d1 taxol/carbo. Patient seen by Dr. Ez Ramey today, see his dictation for visit details. Patient tolerated chemo well.

## 2020-03-18 NOTE — PROGRESS NOTES
Value Ref Range Status   03/18/2020 2.54 1.8 - 7.7 k/uL Final     Hemoglobin   Date Value Ref Range Status   03/18/2020 13.1 12.0 - 16.0 g/dL Final     Platelets   Date Value Ref Range Status   03/18/2020 175 140 - 450 k/uL Final       MEDICATIONS:    Current Outpatient Medications:     lidocaine 4 % external patch, Place 1 patch onto the skin daily, Disp: 30 patch, Rfl: 0    losartan-hydrochlorothiazide (HYZAAR) 100-12.5 MG per tablet, Take 1 tablet by mouth daily, Disp: 30 tablet, Rfl: 5    omeprazole (PRILOSEC) 20 MG delayed release capsule, Take 1 capsule by mouth daily, Disp: 30 capsule, Rfl: 3    losartan (COZAAR) 50 MG tablet, Take 1 tablet by mouth daily, Disp: 30 tablet, Rfl: 5    lidocaine-prilocaine (EMLA) 2.5-2.5 % cream, Apply topically as needed. , Disp: 1 Tube, Rfl: 2    ondansetron (ZOFRAN-ODT) 8 MG TBDP disintegrating tablet, Place 1 tablet under the tongue 3 times daily as needed for Nausea or Vomiting, Disp: 90 tablet, Rfl: 2    OXYGEN, Inhale 2 L into the lungs daily as needed, Disp: , Rfl:     HYDROcodone-acetaminophen (NORCO) 5-325 MG per tablet, Take 1 tablet by mouth every 6 hours as needed for Pain., Disp: , Rfl:     albuterol sulfate  (90 Base) MCG/ACT inhaler, Inhale 2 puffs into the lungs every 6 hours as needed for Wheezing, Disp: 1 Inhaler, Rfl: 6    atorvastatin (LIPITOR) 80 MG tablet, daily , Disp: , Rfl:     carvedilol (COREG) 25 MG tablet, TAKE ONE TABLET BY MOUTH TWICE A DAY WITH MEALS, Disp: 60 tablet, Rfl: 3    SYMBICORT 160-4.5 MCG/ACT AERO, INHALE 1 PUFF BY MOUTH AS DIRECTED TWO TIMES A DAY, Disp: 10.2 g, Rfl: 2    traZODone (DESYREL) 50 MG tablet, TAKE ONE TABLET BY MOUTH DAILY AT BEDTIME, Disp: 30 tablet, Rfl: 5    sertraline (ZOLOFT) 100 MG tablet, Take 1.5 tablets by mouth daily (Patient taking differently: Take 200 mg by mouth daily ), Disp: 30 tablet, Rfl: 5    clopidogrel (PLAVIX) 75 MG tablet, Take 75 mg by mouth daily, Disp: , Rfl:     aspirin 81 MG tablet, Take 81 mg by mouth daily, Disp: , Rfl:     amLODIPine (NORVASC) 10 MG tablet, TAKE ONE TABLET BY MOUTH EVERY DAY, Disp: 90 tablet, Rfl: 1    buPROPion (WELLBUTRIN SR) 150 MG extended release tablet, Take 1 tablet by mouth daily, Disp: 60 tablet, Rfl: 3  No current facility-administered medications for this encounter. Facility-Administered Medications Ordered in Other Encounters:     sodium chloride flush 0.9 % injection 10 mL, 10 mL, Intravenous, PRN, Denis Pringle MD, 10 mL at 03/18/20 1213    heparin flush 100 UNIT/ML injection 500 Units, 500 Units, Intracatheter, PRN, Denis Pringle MD, 500 Units at 03/18/20 1215      ASSESSMENT PLAN:     Treatment setup and plan reviewed. Port images/CBCT images reviewed. Appropriate laboratory work was reviewed. Treatment side effects and toxicities reviewed with the patient, and appropriate management was advised. Will continue radiation treatment as planned, and recommend patient contact us if they have any questions or concerns. Electronically signed by Kiki Chaudhary MD on 3/18/2020 at 1:09 PM      Drugs Prescribed:  New Prescriptions    No medications on file       Other Orders Placed:  No orders of the defined types were placed in this encounter.

## 2020-03-24 NOTE — TELEPHONE ENCOUNTER
received call from patient's  at [facility], Janet Correa ([phone]) asking about assistance with transportation. Patient has [facility] which should have transportation services and encouraged patient to start with that. Janet Correa states she was going to call with Katja Mode to see if services are available.  provided contact information and encouraged them to call if further assistance is needed.

## 2020-03-25 NOTE — PROGRESS NOTES
Pt here for C5D1 Taxol and Carbo concurrent with radiation. Denies any new complaints. Labs drawn from port and results reviewed. Pt was treated without incident and d/c'd in stable condition. Pt will return on 4-1-20 for C6D1 and follow up with Dr Rosa Moses.

## 2020-03-25 NOTE — PROGRESS NOTES
skin daily, Disp: 30 patch, Rfl: 0    losartan-hydrochlorothiazide (HYZAAR) 100-12.5 MG per tablet, Take 1 tablet by mouth daily, Disp: 30 tablet, Rfl: 5    omeprazole (PRILOSEC) 20 MG delayed release capsule, Take 1 capsule by mouth daily, Disp: 30 capsule, Rfl: 3    losartan (COZAAR) 50 MG tablet, Take 1 tablet by mouth daily, Disp: 30 tablet, Rfl: 5    lidocaine-prilocaine (EMLA) 2.5-2.5 % cream, Apply topically as needed. , Disp: 1 Tube, Rfl: 2    ondansetron (ZOFRAN-ODT) 8 MG TBDP disintegrating tablet, Place 1 tablet under the tongue 3 times daily as needed for Nausea or Vomiting, Disp: 90 tablet, Rfl: 2    OXYGEN, Inhale 2 L into the lungs daily as needed, Disp: , Rfl:     HYDROcodone-acetaminophen (NORCO) 5-325 MG per tablet, Take 1 tablet by mouth every 6 hours as needed for Pain., Disp: , Rfl:     albuterol sulfate  (90 Base) MCG/ACT inhaler, Inhale 2 puffs into the lungs every 6 hours as needed for Wheezing, Disp: 1 Inhaler, Rfl: 6    atorvastatin (LIPITOR) 80 MG tablet, daily , Disp: , Rfl:     carvedilol (COREG) 25 MG tablet, TAKE ONE TABLET BY MOUTH TWICE A DAY WITH MEALS, Disp: 60 tablet, Rfl: 3    SYMBICORT 160-4.5 MCG/ACT AERO, INHALE 1 PUFF BY MOUTH AS DIRECTED TWO TIMES A DAY, Disp: 10.2 g, Rfl: 2    traZODone (DESYREL) 50 MG tablet, TAKE ONE TABLET BY MOUTH DAILY AT BEDTIME, Disp: 30 tablet, Rfl: 5    sertraline (ZOLOFT) 100 MG tablet, Take 1.5 tablets by mouth daily (Patient taking differently: Take 200 mg by mouth daily ), Disp: 30 tablet, Rfl: 5    clopidogrel (PLAVIX) 75 MG tablet, Take 75 mg by mouth daily, Disp: , Rfl:     aspirin 81 MG tablet, Take 81 mg by mouth daily, Disp: , Rfl:     amLODIPine (NORVASC) 10 MG tablet, TAKE ONE TABLET BY MOUTH EVERY DAY, Disp: 90 tablet, Rfl: 1    buPROPion (WELLBUTRIN SR) 150 MG extended release tablet, Take 1 tablet by mouth daily, Disp: 60 tablet, Rfl: 3  No current facility-administered medications for this encounter. Facility-Administered Medications Ordered in Other Encounters:     sodium chloride flush 0.9 % injection 10 mL, 10 mL, Intravenous, PRN, Tejas Gongora MD, 10 mL at 03/25/20 1152    heparin flush 100 UNIT/ML injection 500 Units, 500 Units, Intracatheter, PRN, Tejas Gongora MD, 500 Units at 03/25/20 1152      ASSESSMENT PLAN:   Treatment setup and plan reviewed. Port images/CBCT images reviewed. Appropriate laboratory work was reviewed. Treatment side effects and toxicities reviewed with the patient, and appropriate management was advised. Will continue radiation treatment as planned, and recommend patient contact us if they have any questions or concerns. Electronically signed by Sunny Fine MD on 3/25/2020 at 12:23 PM      Drugs Prescribed:  New Prescriptions    No medications on file       Other Orders Placed:  No orders of the defined types were placed in this encounter.

## 2020-03-27 NOTE — TELEPHONE ENCOUNTER
Name: Francia Correa  : 1955  MRN: [id number]    Oncology Navigation Follow-Up Note    Contact Type:  Telephone  Notes: Pt called in stating son sentenced to 8 years in [facility] today. Pt stated \"I just can't come in today\". Support given & instructed pt writer will update [facility]. Pt stated will return to tx 3/30/2020. Spoke with Marija Taegue, [facility], updated on conversation with pt. Will continue to follow.     Electronically signed by Renetta Mars RN on 3/27/2020 at 1:39 PM

## 2020-03-30 PROBLEM — E87.1 DEHYDRATION WITH HYPONATREMIA: Status: ACTIVE | Noted: 2020-01-01

## 2020-03-30 PROBLEM — C34.80 MALIGNANT NEOPLASM OF TRACHEA, BRONCHUS, AND LUNG (HCC): Status: ACTIVE | Noted: 2020-01-01

## 2020-03-30 PROBLEM — C33 MALIGNANT NEOPLASM OF TRACHEA, BRONCHUS, AND LUNG (HCC): Status: ACTIVE | Noted: 2020-01-01

## 2020-03-30 PROBLEM — E86.0 DEHYDRATION WITH HYPONATREMIA: Status: ACTIVE | Noted: 2020-01-01

## 2020-03-30 PROBLEM — I50.22 CHRONIC SYSTOLIC HEART FAILURE (HCC): Status: ACTIVE | Noted: 2018-03-22

## 2020-03-30 PROBLEM — C34.92 ADENOCARCINOMA OF LEFT LUNG (HCC): Status: ACTIVE | Noted: 2020-01-01

## 2020-03-30 NOTE — PROGRESS NOTES
Patient comes in today for her daily radiation treatment session, however presented with significant weakness requiring assistance to walk. Over the weekend patient noted significant fatigue, as well as on physical exam today had erythema around her port site extending up to the right neck. Skin was warm to the touch, however patient was afebrile. Patient's blood pressure however was significantly lower than her normal baseline at 90/62. Given the concern for possible port site infection, patient has been referred to the ED for blood cultures and evaluation for possible sepsis. Signout was called over to triage physician at the ED, and patient was transferred via wheelchair.

## 2020-03-30 NOTE — ED PROVIDER NOTES
[facility]  Emergency Department Encounter  Mid Level Provider     Pt Name: Urszula Cary  MRN: [id number]  [place] 1955  Date of evaluation: 3/30/20  PCP:  Rocio Cooper, [address] [place] [address]       Chief Complaint   Patient presents with    Nausea     pt c/o nausea and abd pain. pt c/o possible infected port a cath with redness present and not warm to touch. pt scratched port and has a open area with mild bleeding noted.  Abdominal Pain       HISTORY OF PRESENT ILLNESS  (Location/Symptom, Timing/Onset,Context/Setting, Quality, Duration, Modifying Factors, Severity.)      Urszula Cary is a 77 y.o. female who presents with referral from the cancer center. Patient was there for radiation. Patient also receives chemotherapy every Wednesday. She has had 5 chemotherapy treatments with 2 to go. She has 15 more radiation treatments scheduled. Patient with lung cancer with metastasis to right parotid and underneath the sternum. Patient states she is having worsening weakness. She has nausea with abdominal pain which is new today. Patient also reports she accidentally scratched the skin over her port causing it to bleed today while in the shower. She denies fever chills. She has had some constipation. Patient is had some mild urinary symptoms. Patient appeared short of breath when I first examined her. She states this is normal with exertion. She reports is not worse than her baseline. Patient denies cough, nasal congestion or chest pain. Patient pale, arrives with oxygen in place. Per cancer center her baseline blood pressure is 90/62.   Cancer center was concerned that her port is infected causing sepsis    PAST MEDICAL /SURGICAL / SOCIAL / FAMILY HISTORY      has a past medical history of Arthritis, CAD (coronary artery disease), Cancer ([address] Chest discomfort, CHF (congestive heart failure) ([facility] [address] Chronic back pain, COPD (chronic obstructive pulmonary disease) ([facility] [address] Cough, Depression, History of hepatitis, Hx of blood clots, Hyperlipidemia, Hypertension, Mediastinal lymphadenopathy, On home O2, SOB (shortness of breath), Wears dentures, and Wears glasses. has a past surgical history that includes Appendectomy; Tonsillectomy; shoulder surgery (Right); Ankle surgery (Right); fine needle aspiration (Right, 2019); other surgical history; Cardiac surgery; bone marrow biopsy (Left, 2019); Hysterectomy, total abdominal (); other surgical history (2020); and bronchoscopy (N/A, 2020). Social History     Socioeconomic History    Marital status:      Spouse name: Not on file    Number of children: Not on file    Years of education: Not on file    Highest education level: Not on file   Occupational History    Not on file   Social Needs    Financial resource strain: Not on file    Food insecurity     Worry: Not on file     Inability: Not on file    Transportation needs     Medical: Not on file     Non-medical: Not on file   Tobacco Use    Smoking status: Former Smoker     Packs/day: 0.25     Years: 55.00     Pack years: 13.75     Types: Cigarettes     Start date: 1963     Last attempt to quit: 2019     Years since quittin.2    Smokeless tobacco: Never Used   Substance and Sexual Activity    Alcohol use: No    Drug use: Yes     Types: Cocaine, Marijuana     Comment: cocaine clean date; May, 2001.   Patient smokes Marijuana currently prn for back pain    Sexual activity: Not Currently   Lifestyle    Physical activity     Days per week: Not on file     Minutes per session: Not on file    Stress: Not on file   Relationships    Social connections     Talks on phone: Not on file     Gets together: Not on file     Attends Adventist service: Not on file     Active member of club or organization: Not on file     Attends meetings of clubs or organizations: Not on file     Relationship status: Not on file    Intimate partner Morphology Normal    Comprehensive Metabolic Panel   Result Value Ref Range    Glucose 106 (H) 70 - 99 mg/dL    BUN 34 (H) 8 - 23 mg/dL    CREATININE 1.63 (H) 0.50 - 0.90 mg/dL    Bun/Cre Ratio NOT REPORTED 9 - 20    Calcium 9.1 8.6 - 10.4 mg/dL    Sodium 125 (L) 135 - 144 mmol/L    Potassium 3.3 (L) 3.7 - 5.3 mmol/L    Chloride 84 (L) 98 - 107 mmol/L    CO2 23 20 - 31 mmol/L    Anion Gap 18 (H) 9 - 17 mmol/L    Alkaline Phosphatase 52 35 - 104 U/L    ALT 21 5 - 33 U/L    AST 42 (H) <32 U/L    Total Bilirubin 0.70 0.3 - 1.2 mg/dL    Total Protein 6.5 6.4 - 8.3 g/dL    Alb 3.6 3.5 - 5.2 g/dL    Albumin/Globulin Ratio 1.2 1.0 - 2.5    GFR Non- 31 (L) >60 mL/min    GFR  38 (L) >60 mL/min    GFR Comment          GFR Staging NOT REPORTED    Protime-INR   Result Value Ref Range    Protime 10.1 9.4 - 12.6 sec    INR 1.0    Lipase   Result Value Ref Range    Lipase 12 (L) 13 - 60 U/L   Troponin   Result Value Ref Range    Troponin, High Sensitivity 48 (H) 0 - 14 ng/L    Troponin T NOT REPORTED <0.03 ng/mL    Troponin Interp NOT REPORTED    Lactic Acid, Plasma   Result Value Ref Range    Lactic Acid 1.5 0.5 - 2.2 mmol/L    Lactic Acid, Whole Blood NOT REPORTED 0.7 - 2.1 mmol/L   EKG 12 Lead   Result Value Ref Range    Ventricular Rate 92 BPM    Atrial Rate 92 BPM    P-R Interval 198 ms    QRS Duration 100 ms    Q-T Interval 402 ms    QTc Calculation (Bazett) 497 ms    P Axis 72 degrees    R Axis 90 degrees    T Axis 69 degrees       EMERGENCY DEPARTMENT COURSE:  Discussed all results with patient. Patient reports she does feel a little better however she continues to appear very weak and tired. I discussed admission. At first she did decline because she is worried about her cat being home alone. Patient was not even able to physically sit up in the bed on her own. Advised it would be unsafe for her to drive home. Unsafe for her to be in her home at this time as she does live alone.   She

## 2020-03-30 NOTE — ED NOTES
ANTONIETA TEJADA AT BEDSIDE AT TO UPDATE RESULTS AND PLAN OF CARE.        Brad Rodriguez RN  03/30/20 1737

## 2020-03-30 NOTE — PROGRESS NOTES
Pt presented to radiation for her daily treatments. Pt noted to be more weak, pale, and complained of severe fatigue. Upon assessment it was noted that pt's port site is warm, red, and has mild blood drainage. Pt denies any trama to port area. She denies fever/chills over the weekend or today but reports having a \"fuzzy mind\" and feeling very weak. Dr Nancy Ochoa notified and examined pt. Pt taken to [facility] ER for further evaluation of port infection. Writer called med/onc triage and left  notifying them of above.

## 2020-03-31 PROBLEM — C82.90 FOLLICULAR LYMPHOMA (HCC): Status: ACTIVE | Noted: 2020-01-01

## 2020-03-31 PROBLEM — A41.9 SEPSIS (HCC): Status: ACTIVE | Noted: 2020-01-01

## 2020-03-31 PROBLEM — R78.81 MRSA BACTEREMIA: Status: ACTIVE | Noted: 2020-01-01

## 2020-03-31 PROBLEM — C82.50 DIFFUSE FOLLICLE CENTER LYMPHOMA (HCC): Status: ACTIVE | Noted: 2020-01-01

## 2020-03-31 PROBLEM — C39.9 MALIGNANT NEOPLASM OF LOWER RESPIRATORY TRACT (HCC): Status: ACTIVE | Noted: 2020-01-01

## 2020-03-31 PROBLEM — D61.818 PANCYTOPENIA (HCC): Status: ACTIVE | Noted: 2020-01-01

## 2020-03-31 PROBLEM — I42.9 CARDIOMYOPATHY (HCC): Status: ACTIVE | Noted: 2020-01-01

## 2020-03-31 PROBLEM — K11.8 MASS OF PAROTID GLAND: Status: ACTIVE | Noted: 2020-01-01

## 2020-03-31 PROBLEM — E66.9 OBESITY, CLASS I, BMI 30-34.9: Status: ACTIVE | Noted: 2019-03-16

## 2020-03-31 PROBLEM — F17.200 NICOTINE DEPENDENCE: Status: ACTIVE | Noted: 2020-01-01

## 2020-03-31 PROBLEM — C34.90 MALIGNANT NEOPLASM OF LUNG (HCC): Status: ACTIVE | Noted: 2020-01-01

## 2020-03-31 PROBLEM — E78.5 HYPERLIPEMIA: Status: ACTIVE | Noted: 2020-01-01

## 2020-03-31 PROBLEM — B95.62 MRSA BACTEREMIA: Status: ACTIVE | Noted: 2020-01-01

## 2020-03-31 PROBLEM — C76.0: Status: ACTIVE | Noted: 2020-01-01

## 2020-03-31 PROBLEM — M54.9 BACKACHE: Status: ACTIVE | Noted: 2020-01-01

## 2020-03-31 PROBLEM — R91.8 LUNG MASS: Status: ACTIVE | Noted: 2020-01-01

## 2020-03-31 PROBLEM — N17.9 AKI (ACUTE KIDNEY INJURY) (HCC): Status: ACTIVE | Noted: 2020-01-01

## 2020-03-31 PROBLEM — S14.109A INJURY OF CERVICAL SPINAL CORD (HCC): Status: ACTIVE | Noted: 2018-06-08

## 2020-03-31 PROBLEM — R07.9 CHEST PAIN: Status: ACTIVE | Noted: 2017-08-04

## 2020-03-31 NOTE — FLOWSHEET NOTE
Situation:  Writer received referral from Urszula Albright RN, to provide support to Patient, whom writer has met before in the cancer center, who will be transferring to [facility]. Assessment:  Ms. Clara Andrews was sitting in the bedside chair. Her eyes were closed. She opened them and greeted writer. She noted that has been through difficult times before and that God has helped her. She appeared to be uncomfortable and shared that she was having some pain. She declined a visit but was receptive to prayer. Intervention:  Writer provided supportive presence and explored Pt's coping and needs; affirmed Pt's strengths; offered words of encouragement and prayer. Outcome:  Ms. Jovi Jose. Writer will ask  at [facility] to visit Patient.         03/31/20 9426   Encounter Summary   Services provided to: Patient   Referral/Consult From: Nurse   Support System Friends/neighbors   Continue Visiting   (3/31/20)   Complexity of Encounter Low   Length of Encounter 15 minutes   Spiritual/Pentecostal   Type Spiritual support   Assessment Approachable   Intervention Prayer;Explored feelings, thoughts, concerns;Sustaining presence/ Ministry of presence   Outcome Expressed gratitude     Electronically signed by Claude Span, Oncology Outpatient [address], [address] [facility] Radiation Oncology  3/31/2020  6:27 PM

## 2020-03-31 NOTE — CONSULTS
home but was found to be febrile in the emergency department. She did report some nausea and mild abdominal discomfort-pain. The patient was admitted for sepsis and potential port infection and I was asked to evaluate and help with antibiotic choice. Blood cultures 2 out of 2 sets drawn in the emergency room yesterday are positive. I have personally reviewed the past medical history, past surgical history, medications, social history, and family history, and I have updated the database accordingly. 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 Kaiser Carson Mediastinal lymphadenopathy     On home O2     all the time    SOB (shortness of breath)     Wears dentures     upper,does not wear lower    Wears glasses      Past Surgical  History:     Past Surgical History:   Procedure Laterality Date    ANKLE SURGERY Right     broken ankle with hardware inserted and later removed    APPENDECTOMY      BONE MARROW BIOPSY Left 12/05/2019    pelvic, benign    BRONCHOSCOPY N/A 1/28/2020    EBUS WITH STATION 4L LYMPH NODE FNA performed by Peng Rachel MD at [facility]      cardiac cath, no stents    FINE NEEDLE ASPIRATION Right 11/2019    lymph nodes, cancer found, follicular    HYSTERECTOMY, TOTAL ABDOMINAL  1982    with BSO    OTHER SURGICAL HISTORY      blood clot extraction from under bladder, angiogram to remove bloot clot and inserted \"something to keep the vein open\" ?stent?     OTHER SpO2 97%   BMI 31.22 kg/m²     Temperature Range: Temp: 98.8 °F (37.1 °C) Temp  Av.2 °F (37.3 °C)  Min: 97.6 °F (36.4 °C)  Max: 102.2 °F (39 °C)  General Appearance: Awake, alert, and in no apparent distress  Head: Normocephalic, without obvious abnormality, atraumatic  Eyes: Pupils equal, round, reactive, to light and accommodation; extraocular movements intact; sclera anicteric; conjunctivae pink  ENT: Oropharynx clear, without erythema, exudate, or thrush. Neck: Supple, without lymphadenopathy. Pulmonary/Chest: Clear to auscultation, without wheezes, rales, or rhonchi  Cardiovascular: Regular rate and rhythm without murmurs, rubs, or gallops. Abdomen: Soft, nontender, nondistended. Extremities: No cyanosis, clubbing, edema, or effusions. Neurologic: No gross sensory or motor deficits. Skin: The right anterior chest does have some erythroderma noted surrounding the port site and along the tunnel for the catheter. She did have some scabbing right over the port and I was able to see the device in the pocket. On removing the scabbing we could see there was some dehiscence along the surgical incision for the pocket. Otherwise the rest of the skin is warm and dry with no rash. Medical Decision Making:   I have independently reviewed/ordered the following labs:  CBC with Differential:   Recent Labs     20  0732   WBC 5.8 3.1*   HGB 11.9* 10.2*   HCT 35.5* 30.9*   PLT 94* 74*   LYMPHOPCT 4* 1*   MONOPCT 5 10*     BMP:   Recent Labs     20  0732 20  1331   *   < > 131* 129*   K 3.3*  --  3.6*  --    CL 84*  --  97*  --    CO2 23  --  23  --    BUN 34*  --  29*  --    CREATININE 1.63*  --  1.14*  --    MG  --   --  1.6  --     < > = values in this interval not displayed.      Hepatic Function Panel:   Recent Labs     20  0732   PROT 6.5 5.5*   LABALBU 3.6 2.9*   BILIDIR  --  0.33*   IBILI  --  0.19   BILITOT 0.70 0.52

## 2020-03-31 NOTE — PROGRESS NOTES
breath), Wears dentures, and Wears glasses. has a past surgical history that includes Appendectomy; Tonsillectomy; shoulder surgery (Right); Ankle surgery (Right); fine needle aspiration (Right, 11/2019); other surgical history; Cardiac surgery; bone marrow biopsy (Left, 12/05/2019); Hysterectomy, total abdominal (1982); other surgical history (01/28/2020); and bronchoscopy (N/A, 1/28/2020). Restrictions  Restrictions/Precautions  Restrictions/Precautions: Up as Tolerated, Fall Risk  Required Braces or Orthoses?: No  Position Activity Restriction  Other position/activity restrictions: 3L O2 in place via nasal canula    Subjective   General  Chart Reviewed: Orders, Progress Notes, History and Physical, Imaging, Labs  Patient assessed for rehabilitation services?: Yes  Family / Caregiver Present: No  General Comment  Comments: RN ok'd for therapy this AM. Pt agreeable to participate in session and pleasant/cooperative throughout. Patient Currently in Pain: Yes  Pain Assessment  Pain Assessment: 0-10  Pain Level: 5  Pain Type: Chronic pain  Pain Location: Back;Leg  Pain Orientation: Right;Left  Pain Radiating Towards: B feet  Pain Descriptors: Aching;Discomfort  Pain Frequency: Continuous  Non-Pharmaceutical Pain Intervention(s): Ambulation/Increased Activity; Distraction;Repositioned  Response to Pain Intervention: Patient Satisfied  Vital Signs  Patient Currently in Pain: Yes     Social/Functional History  Social/Functional History  Lives With: Alone  Type of Home: Apartment([facility])  Home Layout: One level  Home Access: Level entry  Bathroom Shower/Tub: Tub/Shower unit  Bathroom Toilet: Handicap height  Bathroom Equipment: Shower chair, Grab bars in shower, Grab bars around toilet  Home Equipment: 4 wheeled walker(Pull cords in bedroom/bathroom for emergency)  Receives Help From: Friend(s), Neighbor(Pt reports supportive friends/neighbors)  ADL Assistance: Independent  Homemaking Strengthening, Balance Training, Functional Mobility Training, Endurance Training, Patient/Caregiver Education & Training, Safety Education & Training, Equipment Evaluation, Education, & procurement, Self-Care / ADL      AM-PAC Score  AM-PAC Inpatient Daily Activity Raw Score: 13 (03/31/20 0959)  AM-PAC Inpatient ADL T-Scale Score : 32.03 (03/31/20 0959)  ADL Inpatient CMS 0-100% Score: 63.03 (03/31/20 0959)  ADL Inpatient CMS G-Code Modifier : CL (03/31/20 0959)    Goals  Short term goals  Time Frame for Short term goals: 14 visits  Short term goal 1: pt will demo ability to consistently follow 2-3 step commands for increased independence with ADLs   Short term goal 2: pt will perform feeding/grooming tasks independently  Short term goal 3: pt will perform UB ADL tasks with CGA using AE/DME PRN  Short term goal 4: pt will perform toileting/LB ADL tasks with min A using AE/DME PRN  Short term goal 5: pt will demo 3+ minutes standing tolerance with use of least restrictive AD for increased participation in ADLs  Short term goal 6: pt will perform functional transfers/functional mobility with min A using least restrictive AD        Therapy Time   Individual Concurrent Group Co-treatment   Time In 0858         Time Out 0937         Minutes 39         Timed Code Treatment Minutes: 8 Minutes       WANDA Haddad/L

## 2020-04-01 NOTE — PROGRESS NOTES
Physical Therapy  DATE: 2020    NAME: Cecilio Damian  MRN: [id number]   : 1955    Patient not seen this date for Physical Therapy due to:  [] Blood transfusion in progress  [] Hemodialysis  []  Patient Declined  [] Spine Precautions   [] Strict Bedrest  [x] Surgery/ Procedure- IR, PT will check back as time allows. [] Testing      [] Other        [] PT being discontinued at this time. Patient independent. No further needs. [] PT being discontinued at this time as the patient has been transferred to palliative care. No further needs.     Early Bills, PT

## 2020-04-01 NOTE — FLOWSHEET NOTE
practices/curt;Discussed illness/injury and it's impact   Outcome Receptive; Hopeful;Encouraged;Coping;Engaged in conversation;Expressed gratitude     Electronically signed by Irving Claros, Oncology Outpatient [facility], [address] [facility] Radiation Oncology  4/1/2020  6:24 PM

## 2020-04-01 NOTE — PLAN OF CARE
Problem: Falls - Risk of:  Goal: Will remain free from falls  Description: Will remain free from falls  Outcome: Ongoing   Pt remains free of falls at this time. Bed locked in lowest position, siderails x2, call light in reach. Non-skid footwear applied. Pt ambulates in room with steady gait. Encouraged pt to call for assistance as needed for safety. Falling star posted outside of room. Will continue to monitor. Problem: Pain:  Goal: Pain level will decrease  Description: Pain level will decrease  Outcome: Ongoing   Pt able to communicate pain as needed, uses call light appropriately. Pt satisfied with pain interventions.

## 2020-04-01 NOTE — H&P
Date End Date Taking? Authorizing Provider   Nutritional Supplements (ENSURE ORIGINAL) LIQD Take 1 Bottle by mouth 3 times daily as needed (replacement) 3/24/20  Yes Zhane Medhleonarda, DO   losartan-hydrochlorothiazide (HYZAAR) 100-12.5 MG per tablet Take 1 tablet by mouth daily 3/6/20  Yes Zhane Medhkour, DO   omeprazole (PRILOSEC) 20 MG delayed release capsule Take 1 capsule by mouth daily 3/4/20  Yes Robby Burns MD   losartan (COZAAR) 50 MG tablet Take 1 tablet by mouth daily 2/29/20  Yes Zhane Wright, DO   lidocaine-prilocaine (EMLA) 2.5-2.5 % cream Apply topically as needed. 2/10/20  Yes Robby Burns MD   ondansetron (ZOFRAN-ODT) 8 MG TBDP disintegrating tablet Place 1 tablet under the tongue 3 times daily as needed for Nausea or Vomiting 2/10/20  Yes Robby Burns MD   OXYGEN Inhale 2 L into the lungs daily as needed   Yes Historical Provider, MD   HYDROcodone-acetaminophen (NORCO) 5-325 MG per tablet Take 1 tablet by mouth every 6 hours as needed for Pain.    Yes Historical Provider, MD   atorvastatin (LIPITOR) 80 MG tablet daily  12/6/19  Yes Historical Provider, MD   carvedilol (COREG) 25 MG tablet TAKE ONE TABLET BY MOUTH TWICE A DAY WITH MEALS 11/8/19  Yes Zhane Adriana, DO   SYMBICORT 160-4.5 MCG/ACT AERO INHALE 1 PUFF BY MOUTH AS DIRECTED TWO TIMES A DAY 11/1/19  Yes Zhane Medhkour, DO   traZODone (DESYREL) 50 MG tablet TAKE ONE TABLET BY MOUTH DAILY AT BEDTIME 10/2/19  Yes Zhane Medhkour, DO   sertraline (ZOLOFT) 100 MG tablet Take 1.5 tablets by mouth daily  Patient taking differently: Take 200 mg by mouth daily  9/6/19  Yes Zhane Medhkour, DO   clopidogrel (PLAVIX) 75 MG tablet Take 75 mg by mouth daily   Yes Historical Provider, MD   aspirin 81 MG tablet Take 81 mg by mouth daily   Yes Historical Provider, MD   amLODIPine (NORVASC) 10 MG tablet TAKE ONE TABLET BY MOUTH EVERY DAY 1/4/19  Yes Louise Leon MD   buPROPion (WELLBUTRIN SR) 150 MG extended release tablet Take 1 tablet by per 100 WBC    Differential Type NOT REPORTED     Immature Granulocytes NOT REPORTED 0 %    Absolute Immature Granulocyte NOT REPORTED 0.00 - 0.30 k/uL    WBC Morphology NOT REPORTED     RBC Morphology NOT REPORTED     Platelet Estimate NOT REPORTED     Seg Neutrophils 88 (H) 36 - 66 %    Lymphocytes 1 (L) 24 - 44 %    Monocytes 10 (H) 1 - 7 %    Eosinophils % 1 1 - 4 %    Basophils 0 0 - 2 %    Segs Absolute 2.73 1.8 - 7.7 k/uL    Absolute Lymph # 0.03 (L) 1.0 - 4.8 k/uL    Absolute Mono # 0.31 0.1 - 0.8 k/uL    Absolute Eos # 0.03 0.0 - 0.4 k/uL    Basophils Absolute 0.00 0.0 - 0.2 k/uL    Morphology Normal    SODIUM    Collection Time: 03/31/20  1:31 PM   Result Value Ref Range    Sodium 129 (L) 135 - 144 mmol/L   Culture, Wound    Collection Time: 03/31/20  3:59 PM   Result Value Ref Range    Specimen Description . ABSCESS     Special Requests CHEST PORT DRAINAGE     Direct Exam FEW NEUTROPHILS (A)     Direct Exam FEW GRAM POSITIVE COCCI (A)     Culture PENDING    SODIUM    Collection Time: 03/31/20  7:37 PM   Result Value Ref Range    Sodium 127 (L) 135 - 144 mmol/L   Lactate, Sepsis    Collection Time: 04/01/20  1:31 AM   Result Value Ref Range    Lactic Acid, Sepsis NOT REPORTED 0.5 - 1.9 mmol/L    Lactic Acid, Sepsis, Whole Blood 1.0 0.5 - 1.9 mmol/L       Imaging/Diagnostics:  Ct Abdomen Pelvis W Iv Contrast Additional Contrast? None    Result Date: 3/30/2020  No acute finding in the abdomen or pelvis. No evidence of metastatic disease in the abdomen or pelvis. Cholelithiasis with a 2.2 cm rim calcified gallstone. There are no findings to suggest acute cholecystitis. Clinical correlation and if indicated, follow-up nuclear medicine hepatobiliary imaging study may be helpful in this patient with right abdominal pain and nausea. Xr Chest Portable    Result Date: 3/30/2020  No acute disease. Ct Chest Pulmonary Embolism W Contrast    Result Date: 3/30/2020  No evidence of an acute pulmonary embolus. The mass in the lingula has decreased in size consistent with a response to therapy. Mediastinal lymph nodes are stable to slightly decreased in size. No new adenopathy. Mild bibasilar dependent opacity, likely atelectasis. Assessment :      Hospital Problems           Last Modified POA    * (Principal) Sepsis ([facility] [address] 4/1/2020 Yes    Essential hypertension 4/1/2020 Yes    Chronic systolic congestive heart failure ([address] 4/1/2020 Yes    Overview Signed 6/8/2018  8:18 PM by Betty Deras MD     Lipomatous right atrium            Primary adenocarcinoma of right lung ([facility] [address] 4/1/2020 Yes    Hyponatremia 4/1/2020 Yes    Diffuse follicle center lymphoma ([facility] [address] 4/1/2020 Yes    Pancytopenia ([facility] [address] 4/1/2020 Yes    MRSA bacteremia 4/1/2020 Yes    DANITA (acute kidney injury) ([facility] [address] 4/1/2020 Yes          Plan:     Patient status inpatient in the Med/Surge    1. Sepsis/MRSA bacteremia-- positive blood cultures, chest port as source, continue Vancomycin, patient had aggressive fluid replacement and midodrine added for BP support  2. Diffuse low grade follicular Lymphoma-- follows with Dr. Carolynn Champion  3. Lung Adenocarcinoma-- follows with Dr. Carolynn Champion  4. Hyponatremia-- IV fluids held at present, trend NA, [facility]   5. Pancytopenia-- secondary to tx  6. DANITA-- resolving with IV fluid hydration  7. AM labs and monitor, trend NA and renal function  8. Continue IV ABX  9. Patient will likely need IR consult to have chest port removed  10. ID on board; appreciate further recs  11.  Discussed pertinent findings need with patients RN, no new issues at present, continue current POC and  monitoring    [facility]   [facility]    Patient is admitted as inpatient status because of co-morbidities listed above, severity of signs and symptoms as outlined, requirement for current medical therapies and most importantly because of direct risk to patient if care not provided in a hospital

## 2020-04-01 NOTE — ADT AUTH CERT
Utilization Reviews         Dehydration - Care Day 2 (3/31/2020) by Rogerio Flood RN         Review Status Review Entered   Completed 3/31/2020 15:54       Criteria Review      Care Day: 2 Care Date: 3/31/2020 Level of Care:    Guideline Day 2    Level Of Care    ( ) Floor to discharge [B]    3/31/2020 3:54 PM EDT by Rhina Hunter      3/31 - remains on tele unit    Clinical Status    ( ) * Hemodynamic stability    3/31/2020 3:54 PM EDT by Rhina Hunter      92/55    ( ) * Mental status at baseline    ( ) * Vomiting absent or controlled    ( ) * Electrolyte levels normal or acceptable for outpatient follow-up    ( ) * Cause of dehydration requiring inpatient treatment absent    ( ) * Renal function at baseline or improved    ( ) * Discharge plans and education understood    Activity    ( ) * Ambulatory    Routes    ( ) * Oral hydration    ( ) * Liquid or advanced diet    * Milestone   Additional Notes   3/31    remains on tele unit    mt yesterday 101. mt today at time of review 102.2    18 81 92/55   97% 3lnc    +dyspnea w/ exertion    wean o2 as tolerated to home dose of 2lnc ** **    cont tylenol po 650mg q4h prn  x1       na 128, 131  -> cont ivfs ns 150cc/hr . . d/c when PCP rounded (see below)    bs 124   bun 29  cr. 1.14   3rd hs troponin 41    alb. 2.9   t. protien 5.5   ast 41   d. bili. 0.33   bld c/s x2 pend       ua = small blood, trace protein, + nitrate, few bacteria       clear liqid diet    resume home norvasc, asa, lipitor, coreg, wellbutrin sr   cont desyrel, symbicort inhaler, zoloft, cozaar    cont home plavix, norco 5/325 q6h prn pain  x1    cont iv zosyn 3.375gm q8h    iv vanco 1,250mg q12h       H&P   Pleasant 73-year-old female presented from her doctor's office for    extreme fatigue and concerns of port site infection.    Patient did scratch port site couple of days ago while showering    and 2 days later started to have significant fatigue and tiredness    and dyspnea needing more oxygen than her baseline   Initial labs were significant for DANITA, hypokalemia, hyponatremia,    she was hemoconcentrated, initial imaging occluding CT PE and CT    abdomen with no remarkable acute pathology, actually her lingula    lung mass shrunk consistent with response to therapy. Patient was admitted for further management. Physical Exam   Constitutional:        Appearance: Normal appearance. HENT:       Head: Normocephalic and atraumatic.       Right Ear: External ear normal.       Left Ear: External ear normal.       Nose: Nose normal. No rhinorrhea.       Mouth/Throat:       Mouth: Mucous membranes are moist.    Eyes:       General: No scleral icterus.         Right eye: No discharge.          Left eye: No discharge.       Extraocular Movements: Extraocular movements intact.       Pupils: Pupils are equal, round, and reactive to light. Neck:       Musculoskeletal: Normal range of motion.       Comments: No JVD   Cardiovascular:       Rate and Rhythm: Normal rate and regular rhythm.       Pulses: Normal pulses.       Heart sounds: Normal heart sounds. Pulmonary:       Effort: Pulmonary effort is normal.       Breath sounds: Normal breath sounds. Abdominal:       General: Bowel sounds are normal.       Palpations: Abdomen is soft. Musculoskeletal: Normal range of motion.          General: Tenderness present. No swelling.       Right lower leg: No edema.       Left lower leg: No edema.       Comments: Bilateral lower extremities    Skin:      General: Skin is warm and dry.       Findings: Lesion present.       Comments: Scratch to right chest port site, actively draining small amount of serosanguineous drainage, no odor    Neurological:       General: No focal deficit present.       Mental Status: She is alert and oriented to person, place, and time. Psychiatric:          Behavior: Behavior normal.          Thought Content:  Thought content normal.          Judgment: Judgment normal.       Comments: Appears slightly anxious           She was aggressively hydrated and antibiotics were started,    blood cultures 2/2 are coming back positive for MRSA. Continue vancomycin. Okay to DC Zosyn. ID consult. Patient is also hypotensive, start midodrine   Hold antihypertensives   Her hyponatremia slightly hypo-volemic sodium jumped 6 mEq,    stop fluids and monitor sodium levels to decide what kind of    fluid she might need   Pre renal azotemia improved with hydration, hold   Thrombocytopenia   Prolonged QT: Hold Zoloft for now   DVT prophylaxis, okay to resume if platelets are constantly above 50 K   Presentation is very critical given the condition and multiple    co morbidities . Chong Baumgarten  Discussed with the patient and staff      contact isolation    iv vanco 1250mg qd    midodrine po 10mg tid    ID consult pending       d/c plan: home vs rehab pending pt's progress

## 2020-04-01 NOTE — PROGRESS NOTES
Patient arrived to unit from [facility] Patient is alert and oriented. Patient oriented to room. Safety measures are met, patient denies any further needs at this time and call light in reach. Writer will notify primary that patient has arrived.

## 2020-04-01 NOTE — CONSULTS
Nephrology Consult Note    Reason for Consult: Hyponatremia and acute renal failure  Requesting Physician: Hospitalist service  Chief Complaint: Admitted with night sweats  History Obtained From: Patient and chart review  History of Present Illness: This is a 77 y.o. female who initially presented to [facility] with increasing shortness of breath and night sweats. She found to have infected chest port and started on vancomycin. Patient has a adenocarcinoma of left lung and receiving chemotherapy and also carries a diagnosis of follicular lymphoma. The reason for nephrology consult was hyponatremia and acute renal failure. .  When she was admitted to [facility] her sodium was 125. Her most recent sodium is 127. Her baseline creatinine is 0.5 2.6. When she was admitted on 3/30/2020 her creatinine was 1.6 and today is down to 1.1 mg/dL  Her prior sodiums are within normal limits. Patient states that recently she is feeling dry and dry mouth and her usual water consumption is close to a gallon or more. She denies any history of nausea. There is no history of confusion. Patient was not on hydrochlorothiazide at home. Patient does not have liver disease. There is no history of congestive cardiac failure.     Past Medical History:        Diagnosis Date    Arthritis     CAD (coronary artery disease)     fatty pockets in the atrium, states \"30% beating\"    Cancer ([facility] [address]     follicular lymphoma- 32/0859, lung     Chest discomfort     CHF (congestive heart failure) (HCC)     Chronic back pain     \"pinched columns\", smokes marijuana prn for back pain    COPD (chronic obstructive pulmonary disease) (HCC)     Cough     Depression     History of hepatitis age 21    Hx of blood clots     Hyperlipidemia     Hypertension     Dr. Brewster Majestic Mediastinal lymphadenopathy     On home O2     all the time    SOB (shortness of breath)     Wears dentures     upper,does not Packs/day: 0.25     Years: 55.00     Pack years: 13.75     Types: Cigarettes     Start date: 1963     Last attempt to quit: 2019     Years since quittin.2    Smokeless tobacco: Never Used   Substance and Sexual Activity    Alcohol use: No    Drug use: Not Currently     Types: Cocaine, Marijuana     Comment: cocaine clean date; May, 2001. Patient smokes Marijuana currently prn for back pain    Sexual activity: Not Currently   Lifestyle    Physical activity     Days per week: Not on file     Minutes per session: Not on file    Stress: Not on file   Relationships    Social connections     Talks on phone: Not on file     Gets together: Not on file     Attends Yazdanism service: Not on file     Active member of club or organization: Not on file     Attends meetings of clubs or organizations: Not on file     Relationship status: Not on file    Intimate partner violence     Fear of current or ex partner: Not on file     Emotionally abused: Not on file     Physically abused: Not on file     Forced sexual activity: Not on file   Other Topics Concern    Not on file   Social History Narrative    Not on file       Family History:   Family History   Problem Relation Age of Onset    Heart Disease Mother     High Blood Pressure Mother     Cancer Mother         cervix    Heart Disease Father     High Blood Pressure Father     Stroke Father     Cancer Sister         cervix    Stroke Sister     Cancer Paternal Grandfather         lung    Hypertension Brother     No Known Problems Sister        Review of Systems:    Constitutional: No fever. He was having night sweats  HEENT:  No headache   cardiac:  No chest pain. Chest:  No cough, phlegm or wheezing. Abdomen:  No abdominal pain, nausea or vomiting. Neuro:  No focal weakness, abnormal movements orseizure like activity. Skin:   No rashes, no itching. :   No hematuria, no pyuria, no dysuria, no flank pain.   Extremities:  Increasing leg swelling    Objective:  CURRENT TEMPERATURE:  Temp: 97.1 °F (36.2 °C)  MAXIMUM TEMPERATURE OVER 24HRS:  Temp (24hrs), Av.6 °F (37 °C), Min:97.1 °F (36.2 °C), Max:99.9 °F (37.7 °C)    CURRENT RESPIRATORY RATE:  Resp: 27  CURRENT PULSE:  Pulse: 82  CURRENT BLOOD PRESSURE:  BP: (!) 139/92  24HR BLOOD PRESSURE RANGE:  Systolic (12MGS), NZF:468 , Min:90 , Z   ; Diastolic (05WSG), PDA:21, Min:50, Max:92    24HR INTAKE/OUTPUT:  No intake or output data in the 24 hours ending 20 1203  Patient Vitals for the past 96 hrs (Last 3 readings):   Weight   20 0559 194 lb 0.1 oz (88 kg)   20 2343 194 lb 0.1 oz (88 kg)     Physical Exam:  General appearance: Awake and alert x3  Skin: warm and dry, no rash or erythema  Eyes: conjunctivae normal and sclera anicteric  ENT: : No thrush or pharyngeal congestion  Neck: Neck was supple no JVD  Pulmonary no wheezing or rhonchi  Cardiovascular: S1 and S2 audible no S3   abdomen: soft nontender, bowel sounds present, no organomegaly,  no ascites  Extremities: No edema    Labs:   CBC:  Recent Labs     20  1655 20  0732   WBC 5.8 3.1*   RBC 4.23 3.65*   HGB 11.9* 10.2*   HCT 35.5* 30.9*   MCV 83.8 84.8   MCH 28.1 27.9   MCHC 33.5 33.0   RDW 16.2* 16.9*   PLT 94* 74*   MPV 10.1 9.4      BMP:   Recent Labs     20  1655  20  0732 20  1331 20  1937   *   < > 131* 129* 127*   K 3.3*  --  3.6*  --   --    CL 84*  --  97*  --   --    CO2 23  --  23  --   --    BUN 34*  --  29*  --   --    CREATININE 1.63*  --  1.14*  --   --    GLUCOSE 106*  --  124*  --   --    CALCIUM 9.1  --  8.3*  --   --     < > = values in this interval not displayed. Phosphorus:    Recent Labs     20  0732   PHOS 2.7     Magnesium:   Recent Labs     20  0732   MG 1.6     Albumin:   Recent Labs     20  1655 20  0732   LABALBU 3.6 2.9*     Assessment:  1.   Mild to moderate hyponatremia most likely due to excessive water consumption

## 2020-04-02 NOTE — PLAN OF CARE
Problem: Falls - Risk of:  Goal: Will remain free from falls  Description: Will remain free from falls  Outcome: Ongoing   Pt remains free of falls at this time. Bed locked in lowest position, siderails x2, call light in reach. Non-skid footwear applied. Pt ambulates in room with steady gait. Encouraged pt to call for assistance as needed for safety. Falling star posted outside of room. Will continue to monitor. Problem: Pain:  Goal: Control of acute pain  Description: Control of acute pain  Outcome: Ongoing   Pt able to communicate pain as needed, uses call light appropriately. Pt satisfied with pain interventions.

## 2020-04-02 NOTE — PROGRESS NOTES
(VANCOCIN) intermittent dosing (placeholder)   Other RX Placeholder    atorvastatin  80 mg Oral Daily    lidocaine  1 patch Transdermal Daily    budesonide-formoterol  1 puff Inhalation BID    vancomycin  1,250 mg Intravenous Q24H     Continuous Infusions:   PRN Meds: potassium chloride **OR** potassium alternative oral replacement **OR** potassium chloride, sodium phosphate IVPB **OR** sodium phosphate IVPB, sodium chloride flush, acetaminophen **OR** acetaminophen, albuterol, HYDROcodone 5 mg - acetaminophen    Data:     Past Medical History:   has a past medical history of Arthritis, CAD (coronary artery disease), Cancer ([facility] [address] Chest discomfort, CHF (congestive heart failure) ([facility] [address] Chronic back pain, COPD (chronic obstructive pulmonary disease) ([facility] [address] Cough, Depression, History of hepatitis, Hx of blood clots, Hyperlipidemia, Hypertension, Mediastinal lymphadenopathy, On home O2, SOB (shortness of breath), Wears dentures, and Wears glasses. Social History:   reports that she quit smoking about 15 months ago. Her smoking use included cigarettes. She started smoking about 56 years ago. She has a 13.75 pack-year smoking history. She has never used smokeless tobacco. She reports previous drug use. Drugs: Cocaine and Marijuana. She reports that she does not drink alcohol.      Family History:   Family History   Problem Relation Age of Onset    Heart Disease Mother     High Blood Pressure Mother     Cancer Mother         cervix    Heart Disease Father     High Blood Pressure Father     Stroke Father     Cancer Sister         cervix    Stroke Sister     Cancer Paternal Grandfather         lung    Hypertension Brother     No Known Problems Sister        Vitals:  /74   Pulse 85   Temp 98.6 °F (37 °C) (Oral)   Resp 16   Ht 5' 4\" (1.626 m)   Wt 185 lb 10 oz (84.2 kg)   SpO2 96%   BMI 31.86 kg/m²   Temp (24hrs), Av.9 °F (36.6 °C), Min:96.9 °F (36.1 °C), Max:98.6 °F (37 °C)    No results for

## 2020-04-02 NOTE — ADT AUTH CERT
platelets are  constantly above 50 K       Presentation is very critical given the condition and multiple co morbidities

## 2020-04-02 NOTE — PROGRESS NOTES
Physical Therapy  Facility/Department: [address] STEPDOWN  Daily Treatment Note  NAME: Urszula Cary  : 1955  MRN: [id number]    Date of Service: 2020    Discharge Recommendations:  Patient would benefit from continued therapy after discharge   PT Equipment Recommendations  Equipment Needed: Yes  Mobility Devices: Rafael Dwight: Rolling    Assessment   Body structures, Functions, Activity limitations: Decreased functional mobility ; Decreased endurance;Decreased strength;Decreased balance  Assessment: Pt cooperative and motivated. Required minAx2 for mobility and safety. Pt lives alone and is currently unsafe to return to previous living situation  Prognosis: Good  PT Education: General Safety;Gait Training;Home Exercise Program;Transfer Training;Functional Mobility Training  Patient Education: DVT prevention  REQUIRES PT FOLLOW UP: Yes  Activity Tolerance  Activity Tolerance: Patient limited by endurance; Patient limited by pain     Patient Diagnosis(es): There were no encounter diagnoses. has a past medical history of Arthritis, CAD (coronary artery disease), Cancer ([facility] [address] Chest discomfort, CHF (congestive heart failure) ([facility] [address] Chronic back pain, COPD (chronic obstructive pulmonary disease) ([facility] [address] Cough, Depression, History of hepatitis, Hx of blood clots, Hyperlipidemia, Hypertension, Mediastinal lymphadenopathy, On home O2, SOB (shortness of breath), Wears dentures, and Wears glasses. has a past surgical history that includes Appendectomy; Tonsillectomy; shoulder surgery (Right); Ankle surgery (Right); fine needle aspiration (Right, 2019); other surgical history; Cardiac surgery; bone marrow biopsy (Left, 2019); Hysterectomy, total abdominal (); other surgical history (2020); and bronchoscopy (N/A, 2020). Restrictions     Subjective   General  Chart Reviewed: Yes  Response To Previous Treatment: Patient with no complaints from previous session.   Subjective  Subjective: Pt

## 2020-04-02 NOTE — CONSULTS
Highest education level: Not on file   Occupational History    Not on file   Social Needs    Financial resource strain: Not on file    Food insecurity     Worry: Not on file     Inability: Not on file    Transportation needs     Medical: Not on file     Non-medical: Not on file   Tobacco Use    Smoking status: Former Smoker     Packs/day: 0.25     Years: 55.00     Pack years: 13.75     Types: Cigarettes     Start date: 1963     Last attempt to quit: 2019     Years since quittin.2    Smokeless tobacco: Never Used   Substance and Sexual Activity    Alcohol use: No    Drug use: Not Currently     Types: Cocaine, Marijuana     Comment: cocaine clean date; May, 2001.   Patient smokes Marijuana currently prn for back pain    Sexual activity: Not Currently   Lifestyle    Physical activity     Days per week: Not on file     Minutes per session: Not on file    Stress: Not on file   Relationships    Social connections     Talks on phone: Not on file     Gets together: Not on file     Attends Evangelical service: Not on file     Active member of club or organization: Not on file     Attends meetings of clubs or organizations: Not on file     Relationship status: Not on file    Intimate partner violence     Fear of current or ex partner: Not on file     Emotionally abused: Not on file     Physically abused: Not on file     Forced sexual activity: Not on file   Other Topics Concern    Not on file   Social History Narrative    Not on file       Family History:    Previous Urologic Family history: none  Family History   Problem Relation Age of Onset    Heart Disease Mother     High Blood Pressure Mother     Cancer Mother         cervix    Heart Disease Father     High Blood Pressure Father     Stroke Father     Cancer Sister         cervix    Stroke Sister     Cancer Paternal Grandfather         lung    Hypertension Brother     No Known Problems Sister        REVIEW OF SYSTEMS:  Constitutional: patient is on intravenous antibiotics.  view shows a right jugular chest port with the catheter tip in the superior vena cava. The site is erythematous and the incision is partially open. Small amount of purulent material was expressed from the site. This has been previously cultured. 1% lidocaine was used for local anesthesia. Interrupted sutures from the incision were removed. Using blunt dissection, the port and catheter were able to be excised from the pocket and removed in their entirety. Tip was sent for cultures. Fluoroscopic spot view confirms removal of the entire port and catheter. Gentle manual compression was used to achieve hemostasis. The pocket was irrigated with saline and shown to be clean and dry. Because of the regional erythema and infection, the wound care nurse was consulted for recommendations. The incision was left open and the pocket was packed with iodoform gauze to allow healing by secondary intention. The patient should have arrangements made for follow-up wound care on discharge with possible wound VAC placement. Sterile gauze dressing was applied. There are no immediate complications. The patient left the department in stable condition. EBL:  Less than 5 mL. Right jugular chest port removed successfully. Due to the infection, the wound care team was consulted and the pocket was packed with iodoform gauze. The patient should have arrangements made for follow-up wound care upon discharge.        Assessment and Plan   Impression:  76 yo female with      problem list -  -Acute MRSA UTI  -Urinary Retention for 1 Liter  -Mixed Urinary incontinence    Patient Active Problem List   Diagnosis    Essential hypertension    Chronic systolic congestive heart failure (HCC)    Arthritis    Simple chronic bronchitis (HCC)    Closed fracture of distal end of right fibula    Injury of cervical spinal cord (HCC)    Depressive disorder    Primary adenocarcinoma of right

## 2020-04-02 NOTE — DISCHARGE INSTR - COC
Status:  oriented and alert    IV Access:  PICC; L brachial; inserted 4/3/2020    Nursing Mobility/ADLs:  Walking   Assisted  Transfer  Assisted  Bathing  Assisted  Dressing  Assisted  Toileting  Independent  Feeding  Independent  Med Admin  Independent  Med Delivery   whole    Wound Care Documentation and Therapy:    RIGHT ANTERIOR CHEST WOUND/PORT SITE:  Change dressing daily; irrigate with saline and fill wound with 1/2\" iodoform gauze. Cover with gauze and tegaderm.      04/03/20 1300   Incision 02/24/20 Chest Right;Upper   Date First Assessed: 02/24/20   Present on Hospital Admission: No  Primary Wound Type: Surgical Type  Location: Chest  Wound Location Orientation: Right;Upper   Wound Image    Wound Assessment  Wound irrigated with saline. Red  (smooth  Non granular)   Mariya-wound Assessment Hyperpigmented   Wound Length (cm) 1.2 cm   Wound Width (cm) 2.9 cm   Wound Depth (cm) 1 cm  (undermines 1.5 cm @12 and 1 cm @6 o'clock)   Wound Volume (cm^3) 3.48 cm^3   Drainage Amount Small   Drainage Description Serosanguinous   Odor None   Dressing/Treatment Packing  (1/2\" iodoform gauze)   Dressing Changed Changed/New   Dressing Status Changed   Dressing Change Due 04/04/20          Elimination:  Continence:   · Bowel: Yes  · Bladder: Yes q4-6 hr bladder scanning, intermittent straight cath greater than 400 mL  Urinary Catheter: None   Colostomy/Ileostomy/Ileal Conduit: No       Date of Last BM: 4/1/2020    Intake/Output Summary (Last 24 hours) at 4/3/2020 1523  Last data filed at 4/3/2020 1331  Gross per 24 hour   Intake 2880 ml   Output 1782 ml   Net 1098 ml     I/O last 3 completed shifts: In: 2880 [P.O.:2275; I.V.:605]  Out: [address] [Urine:1782]    Safety Concerns: At Risk for Falls    Impairments/Disabilities:      None    Nutrition Therapy:  Current Nutrition Therapy:   - Oral Diet:  Cardiac    Routes of Feeding: Oral  Liquids:  Thin Liquids  Daily Fluid Restriction: yes - amount 1600  Last Modified Barium

## 2020-04-02 NOTE — PROGRESS NOTES
PICC team spoke to RN at bedside, regarding order for PICC line. Suggesting waiting for 48hr after port removal to insert new PICC line to decrease the chances or infecting the PICC line. Also instructed RN to have physician obtain consent and make sure that ID was ok with PICC line and what they recommend. Continue to monitor. Port has been out for approx 24hr today, so will place PICC line tomorrow if consent is completed by physician and ID approves line.

## 2020-04-03 PROBLEM — N28.1 BILATERAL RENAL CYSTS: Status: ACTIVE | Noted: 2020-01-01

## 2020-04-03 PROBLEM — R33.9 INCOMPLETE BLADDER EMPTYING: Status: ACTIVE | Noted: 2020-01-01

## 2020-04-03 NOTE — PROGRESS NOTES
history. She has never used smokeless tobacco. She reports previous drug use. Drugs: Cocaine and Marijuana. She reports that she does not drink alcohol. Family History: family history includes Cancer in her mother, paternal grandfather, and sister; Heart Disease in her father and mother; High Blood Pressure in her father and mother; Hypertension in her brother; No Known Problems in her sister; Stroke in her father and sister. REVIEW OF SYSTEMS:    Constitutional: No fever or chills. No night sweats, no weight loss   Eyes: No eye discharge, double vision, or eye pain   HEENT: negative for sore mouth, sore throat, hoarseness and voice change   Respiratory: negative for cough , sputum, dyspnea, wheezing, hemoptysis, chest pain   Cardiovascular: negative for chest pain, dyspnea, palpitations, orthopnea, PND   Gastrointestinal: negative for nausea, vomiting, diarrhea, constipation, abdominal pain, Dysphagia, hematemesis and hematochezia   Genitourinary: negative for frequency, dysuria, nocturia, urinary incontinence, and hematuria   Integument: negative for rash, skin lesions, bruises.    Hematologic/Lymphatic: negative for easy bruising, bleeding, lymphadenopathy, or petechiae   Endocrine: negative for heat or cold intolerance,weight changes, change in bowel habits and hair loss   Musculoskeletal: negative for myalgias, arthralgias, pain, joint swelling,and bone pain   Neurological: negative for headaches, dizziness, seizures, weakness, numbness    PHYSICAL EXAM:      /79   Pulse 89   Temp 98.2 °F (36.8 °C) (Temporal)   Resp 16   Ht 5' 4\" (1.626 m)   Wt 185 lb 10 oz (84.2 kg)   SpO2 98%   BMI 31.86 kg/m²    Temp (24hrs), Av °F (36.7 °C), Min:96.9 °F (36.1 °C), Max:98.6 °F (37 °C)    General appearance - well appearing, no in pain or distress   Mental status - alert and cooperative   Eyes - pupils equal and reactive, extraocular eye movements intact   Ears - bilateral TM's and external ear canals normal   Mouth - mucous membranes moist, pharynx normal without lesions   Neck - supple, no significant adenopathy   Lymphatics - no palpable lymphadenopathy, no hepatosplenomegaly   Chest - clear to auscultation, no wheezes, rales or rhonchi, symmetric air entry   Heart - normal rate, regular rhythm, normal S1, S2, no murmurs  Abdomen - soft, nontender, nondistended, no masses or organomegaly   Neurological - alert, oriented, normal speech, no focal findings or movement disorder noted   Musculoskeletal - no joint tenderness, deformity or swelling   Extremities - peripheral pulses normal, no pedal edema, no clubbing or cyanosis   Skin - normal coloration and turgor, no rashes, no suspicious skin lesions noted ,    DATA:    Labs:   CBC:   Recent Labs     03/31/20  0732 04/02/20  0349   WBC 3.1* 5.5   HGB 10.2* 9.8*   HCT 30.9* 29.4*   PLT 74* 109*     BMP:   Recent Labs     04/02/20  0349 04/02/20  1712   * 132*   K 3.1* 3.3*   CO2 22 23   BUN 21 17   CREATININE 0.75 0.67   LABGLOM >60 >60   GLUCOSE 109* 97     PT/INR:   Recent Labs     03/31/20  0732 04/02/20  0349   PROTIME 10.4 9.6   INR 1.0 0.9       IMAGING DATA:      Primary Problem  Sepsis [facility])    Active Hospital Problems    Diagnosis Date Noted    [facility] [A41.9] 39/40/3648    Diffuse follicle center lymphoma [facility] [C82.50] 03/31/2020    Pancytopenia ([facility] [D61.818] 03/31/2020    MRSA bacteremia [R78.81] 03/31/2020    DANITA (acute kidney injury) ([facility] [address] [N17.9] 03/31/2020    Hyponatremia [E87.1] 03/30/2020    Primary adenocarcinoma of right lung ([facility] [address] [C34.91] 01/31/2020    Essential hypertension [I10] 03/22/2018    Chronic systolic congestive heart failure ([facility] [address] [I50.22] 03/22/2018         IMPRESSION:   1. Non-small cell lung cancer  2. Follicular lymphoma  3. Port site infection  4. DANTIA    RECOMMENDATIONS:  1. Mediport removed. 2. Continue antibiotics per infectious disease  3. Kidney function continues to improve.   Nephrology

## 2020-04-03 NOTE — PROCEDURES
History/labs/allergies reviewed  Placed by [address] [place] [address] by [facility]  Consent signed and obtained by physician  Time out performed using two identifiers  Catheter type Double lumen picc  Product type solo2 w 3cg  Lot # [id number]  Expiration date 02/28/2021  Catheter size 5  Irish  Trimmed at 42   Total length 43  External catheter length 0 cm  Location LBV  Number of attempts 1  Estimated blood loss 1 ml  Pre procedure cardiac Rhythm NSR per bedside telemetry and/or 3CG Tracing. Placement verified by- CXR and/or 3cg Max P wave noted by amplitude changes of the P wave, positive blood return, flushes easily  Special equipment used- ultrasound, micro-introducer technique and 3cg technology if indicated  Catheter secured with statlock  Dressing applied- Tegaderm CHG  Lidocaine administered intradermally conc. 1% 1 mL  PICC line education:   [ X ] Discussed with patient/Family or POA prior to signing Informed Procedural Consent. Risks and Benefits along with reason for procedure were discussed and teaching was reinforced with an education handout on PICC insertion. [facility] FAQ Catheter Associated Blood Stream Infections and [address] [facility] Egil Kvaleberg REV. 7/13 Nursing and Bard Booklet left at bedside or in chart. Patient (Family or POA) acknowledged understanding of information taught and agreed to procedure. [  ] Was not discussed with patient/family or POA due to pts medical status at time of procedure. pts family or POA not available to discuss PICC education.  [facility] FAQ Catheter Associated Blood Stream Infections and [address] [facility] Egil Kvaleberg REV. 7/13 Nursing and [employer] Booklet left at bedside or in chart            Electronically signed by Luz Preston RN on 4/3/20 at 10:23 AM EDT

## 2020-04-03 NOTE — CONSULTS
[facility]    Urology Consultation    Patient:  Sujata Méndez  MRN: [id number]  YOB: 1955    CHIEF COMPLAINT:  incomplete bladder emptying     HISTORY OF PRESENT ILLNESS:   The patient is a 77 y.o. female who presents with:  Urinary Retention:  Patient is here today for Acute Urinary Retention which occured several day(s) ago   and was sudden in onset. Patient currently does not have a urinary catheter in place. Volume of urine obtained when catheter was placed: volume 1000 cc  Prior to this event voiding symptoms consisted of decreased urinary stream  Prior treatments include: none     Patient's old records, notes and chart reviewed and summarized above.     Past Medical History:    Past Medical History:   Diagnosis Date    Arthritis     CAD (coronary artery disease)     fatty pockets in the atrium, states \"30% beating\"    Cancer ([facility]     follicular lymphoma- 06/9190, lung     Chest discomfort     CHF (congestive heart failure) (HCC)     Chronic back pain     \"pinched columns\", smokes marijuana prn for back pain    COPD (chronic obstructive pulmonary disease) (HCC)     Cough     Depression     History of hepatitis age 21    Hx of blood clots     Hyperlipidemia     Hypertension     Dr. Kaiser Carson Mediastinal lymphadenopathy     On home O2     all the time    SOB (shortness of breath)     Wears dentures     upper,does not wear lower    Wears glasses        Past Surgical History:    Past Surgical History:   Procedure Laterality Date    ANKLE SURGERY Right     broken ankle with hardware inserted and later removed    APPENDECTOMY      BONE MARROW BIOPSY Left 12/05/2019    pelvic, benign    BRONCHOSCOPY N/A 1/28/2020    EBUS WITH STATION 4L LYMPH NODE FNA performed by Peng Rachel MD at [address]      cardiac cath, no stents    FINE NEEDLE ASPIRATION Right 11/2019    lymph nodes, cancer found, follicular    HC  PICC POWERPICC DOUBLE 4/3/2020         HYSTERECTOMY, TOTAL ABDOMINAL  1982    with BSO    OTHER SURGICAL HISTORY      blood clot extraction from under bladder, angiogram to remove bloot clot and inserted \"something to keep the vein open\" ?stent?  OTHER SURGICAL HISTORY  01/28/2020    ENDOBRONCHIAL ULTRASOUND WITH STATION 4L LYMPH NODE FNA    SHOULDER SURGERY Right     rotator cuff    TONSILLECTOMY       Previous  surgery: none   Medications:    Scheduled Meds:   aspirin  81 mg Oral Daily    clopidogrel  75 mg Oral Daily    lidocaine 1 % injection  5 mL Intradermal Once    sodium chloride flush  10 mL Intravenous 2 times per day    tamsulosin  0.4 mg Oral Daily    sodium chloride flush  10 mL Intravenous 2 times per day    enoxaparin  40 mg Subcutaneous Daily    vancomycin (VANCOCIN) intermittent dosing (placeholder)   Other RX Placeholder    atorvastatin  80 mg Oral Daily    lidocaine  1 patch Transdermal Daily    budesonide-formoterol  1 puff Inhalation BID    vancomycin  1,250 mg Intravenous Q24H     Continuous Infusions:  PRN Meds:.potassium chloride **OR** potassium alternative oral replacement **OR** potassium chloride, sodium phosphate IVPB **OR** sodium phosphate IVPB, sodium chloride flush, magnesium sulfate, sodium chloride flush, acetaminophen **OR** acetaminophen, albuterol, HYDROcodone 5 mg - acetaminophen    Allergies:  Patient has no known allergies. Social History:    Social History     Socioeconomic History    Marital status:       Spouse name: Not on file    Number of children: Not on file    Years of education: Not on file    Highest education level: Not on file   Occupational History    Not on file   Social Needs    Financial resource strain: Not on file    Food insecurity     Worry: Not on file     Inability: Not on file    Transportation needs     Medical: Not on file     Non-medical: Not on file   Tobacco Use    Smoking status: Former Smoker     Packs/day: 0.25     Years: 55.00 Pack years: 13.75     Types: Cigarettes     Start date: 1963     Last attempt to quit: 2019     Years since quittin.2    Smokeless tobacco: Never Used   Substance and Sexual Activity    Alcohol use: No    Drug use: Not Currently     Types: Cocaine, Marijuana     Comment: cocaine clean date; May, 2001.   Patient smokes Marijuana currently prn for back pain    Sexual activity: Not Currently   Lifestyle    Physical activity     Days per week: Not on file     Minutes per session: Not on file    Stress: Not on file   Relationships    Social connections     Talks on phone: Not on file     Gets together: Not on file     Attends Gnosticist service: Not on file     Active member of club or organization: Not on file     Attends meetings of clubs or organizations: Not on file     Relationship status: Not on file    Intimate partner violence     Fear of current or ex partner: Not on file     Emotionally abused: Not on file     Physically abused: Not on file     Forced sexual activity: Not on file   Other Topics Concern    Not on file   Social History Narrative    Not on file     Family History:    Family History   Problem Relation Age of Onset    Heart Disease Mother     High Blood Pressure Mother     Cancer Mother         cervix    Heart Disease Father     High Blood Pressure Father     Stroke Father     Cancer Sister         cervix    Stroke Sister     Cancer Paternal Grandfather         lung    Hypertension Brother     No Known Problems Sister      Previous Urologic Family history: none  REVIEW OF SYSTEMS:  Constitutional: positive for  fatigue and malaise  Eyes: negative  Respiratory: negative  Cardiovascular: negative  Gastrointestinal: negative  Genitourinary: see HPI  Musculoskeletal: negative  Skin: negative   Neurological: negative  Hematological/Lymphatic: negative  Psychological: negative    Physical Exam:      This a 77 y.o. female   Patient Vitals for the past 24 hrs:   BP Temp Temp src Pulse Resp SpO2 Weight   04/03/20 0833 -- -- -- -- 26 91 % --   04/03/20 0700 (!) 105/54 99.6 °F (37.6 °C) Temporal 93 27 95 % --   04/03/20 0515 -- -- -- -- -- -- 184 lb 8.4 oz (83.7 kg)   04/03/20 0255 127/61 98.9 °F (37.2 °C) Axillary 81 20 92 % --   04/03/20 0020 -- 98 °F (36.7 °C) Oral -- -- -- --   04/02/20 2345 -- 98.6 °F (37 °C) Oral 82 23 95 % --   04/02/20 2055 -- 99.9 °F (37.7 °C) Oral -- -- -- --   04/02/20 2048 (!) 145/74 -- -- 94 25 95 % --   04/02/20 1512 139/79 98.2 °F (36.8 °C) Temporal 89 16 98 % --   04/02/20 1134 131/74 98.6 °F (37 °C) Oral 85 16 96 % --     Constitutional: Patient in no acute distress. Neuro: Alert and oriented to person, place and time. Psych: mood and affect normal  HEENT negative  Lungs: Respiratory effort is normal  Cardiovascular: Normal peripheral pulses  Abdomen: Soft, non-tender, non-distended with no CVA, flank pain or hepatosplenomegaly. No hernias. Kidneys normal.  Lymphatics: No palpable lymphadenopathy. Bladder non-tender and not distended. Pelvic exam: deferred  Rectal exam not indicated    LABS:   Recent Labs     04/02/20  0349 04/03/20  0642   WBC 5.5 6.3   HGB 9.8* 9.9*   HCT 29.4* 29.5*   MCV 84.2 85.8   * 129*     Recent Labs     04/02/20  0349 04/02/20  1712 04/03/20  0203 04/03/20  0642   * 132*  --  137   K 3.1* 3.3* 3.7 3.7   CL 96* 96*  --  101   CO2 22 23  --  20   PHOS 1.4*  --   --   --    BUN 21 17  --  12   CREATININE 0.75 0.67  --  0.64       Additional Lab/culture results:    Urinalysis: No results for input(s): COLORU, PHUR, LABCAST, WBCUA, RBCUA, MUCUS, TRICHOMONAS, YEAST, BACTERIA, CLARITYU, SPECGRAV, LEUKOCYTESUR, UROBILINOGEN, BILIRUBINUR, BLOODU in the last 72 hours.     Invalid input(s): Cristobal Brady     -----------------------------------------------------------------  Imaging Results:  3/30/20 CT-scan of abdomen and pelvis independently reviewed and radiology report verified demonstrating :     The spleen, pancreas, adrenal glands and kidneys are normal.   There are few unchanged bilateral renal cysts measuring up to 2.9 cm in the   left kidney.       GI/Bowel: Mild sigmoid colon diverticulosis.  No evidence of diverticulitis. No evidence of appendicitis.  No bowel obstruction.       Pelvis: Urinary bladder is empty and therefore not well visualized.  The   uterus is surgically absent. 4/1/20 renal U/S independently reviewed and radiology report verified demonstrating:   Distended urinary bladder. Assessment and Plan   Impression:    Patient Active Problem List   Diagnosis    Essential hypertension    Chronic systolic congestive heart failure (HCC)    Arthritis    Simple chronic bronchitis (HCC)    Closed fracture of distal end of right fibula    Injury of cervical spinal cord (HCC)    Depressive disorder    Primary adenocarcinoma of right lung ([address]    Malignant neoplasm of lung (HCC)    Chronic systolic heart failure (HCC)    Adenocarcinoma of left lung (HCC)    Hyponatremia    Backache    Cardiomyopathy ([address]    Chest pain    Diffuse follicle center lymphoma ([address]    Follicular lymphoma ([address]    Hyperlipemia    Lung mass    Malignant tumor of head and neck ([address]    Mass of parotid gland    Nicotine dependence    Obesity, Class I, BMI 30-34.9    Pancytopenia (HCC)    MRSA bacteremia    DANITA (acute kidney injury) (HCC)    Nausea    Dehydration    Sepsis ([address]       Plan:   1. The patient presents with incomplete bladder emptying and impaired bladder sensation. 2.  Continue intermittent straight catheterization to empty bladder. 3.  Patient will require further investigation with Cystoscopy and Videourodynamics down the road. 4.  Patient has bilateral renal cysts of no clinical significance. Thank you for allowing me to participate in the care of this patient. Feel free to contact me at [phone] if you have any questions or concerns.      Martha Canas Carmita Camera  9:47 AM 4/3/2020

## 2020-04-03 NOTE — PROGRESS NOTES
smoking about 15 months ago. Her smoking use included cigarettes. She started smoking about 56 years ago. She has a 13.75 pack-year smoking history. She has never used smokeless tobacco. She reports previous drug use. Drugs: Cocaine and Marijuana. She reports that she does not drink alcohol. Family History: family history includes Cancer in her mother, paternal grandfather, and sister; Heart Disease in her father and mother; High Blood Pressure in her father and mother; Hypertension in her brother; No Known Problems in her sister; Stroke in her father and sister. REVIEW OF SYSTEMS:    Constitutional: No fever or chills. No night sweats, no weight loss   Eyes: No eye discharge, double vision, or eye pain   HEENT: negative for sore mouth, sore throat, hoarseness and voice change   Respiratory: negative for cough , sputum, dyspnea, wheezing, hemoptysis, chest pain   Cardiovascular: negative for chest pain, dyspnea, palpitations, orthopnea, PND   Gastrointestinal: negative for nausea, vomiting, diarrhea, constipation, abdominal pain, Dysphagia, hematemesis and hematochezia   Genitourinary: negative for frequency, dysuria, nocturia, urinary incontinence, and hematuria   Integument: negative for rash, skin lesions, bruises.    Hematologic/Lymphatic: negative for easy bruising, bleeding, lymphadenopathy, or petechiae   Endocrine: negative for heat or cold intolerance,weight changes, change in bowel habits and hair loss   Musculoskeletal: negative for myalgias, arthralgias, pain, joint swelling,and bone pain   Neurological: negative for headaches, dizziness, seizures, weakness, numbness    PHYSICAL EXAM:      /76   Pulse 86   Temp 98.7 °F (37.1 °C) (Oral)   Resp 25   Ht 5' 4\" (1.626 m)   Wt 184 lb 8.4 oz (83.7 kg)   SpO2 96%   BMI 31.67 kg/m²    Temp (24hrs), Av.8 °F (37.1 °C), Min:98 °F (36.7 °C), Max:99.9 °F (37.7 °C)    General appearance - well appearing, no in pain or distress   Mental status -

## 2020-04-03 NOTE — PROGRESS NOTES
reports/films. I agree with the plan as noted above.     Electronically signed by Lorri Hernandez MD on 4/3/2020 at 9:54 AM

## 2020-04-03 NOTE — PROGRESS NOTES
04/03/20 1300   Incision 02/24/20 Chest Right;Upper   Date First Assessed: 02/24/20   Present on Hospital Admission: No  Primary Wound Type: Surgical Type  Location: Chest  Wound Location Orientation: Right;Upper   Wound Image    Wound Assessment  Wound irrigated with saline. Red  (smooth  Non granular)   Mariya-wound Assessment Hyperpigmented   Wound Length (cm) 1.2 cm   Wound Width (cm) 2.9 cm   Wound Depth (cm) 1 cm  (undermines 1.5 cm @12 and 1 cm @6 o'clock)   Wound Volume (cm^3) 3.48 cm^3   Drainage Amount Small   Drainage Description Serosanguinous   Odor None   Dressing/Treatment Packing  (1/2\" iodoform gauze)   Dressing Changed Changed/New   Dressing Status Changed   Dressing Change Due 04/04/20     Patient reports the site is much less painful. Patient prefers to have home care manage the wound; can continue with daily dressing changes using iodoform packing gauze;  may benefit from NPWT and referral to [address] [place] [address].

## 2020-04-03 NOTE — CARE COORDINATION
Urology:    Pt to continue on timed double voiding and straight cathing regimen per orders. If at time of discharge pt still unable to void well on own and requiring straight cathing, please page urology for home intermittent straight cathing instructions. Urology will sign off at this time.     Kingston Ramsay PA-C  Urology Service   4/3/2020 10:05 AM

## 2020-04-03 NOTE — PROGRESS NOTES
Status:  oriented to person, place and time and normal affect  Lungs:  clear to auscultation bilaterally, normal effort  Heart:  regular rate and rhythm, no murmur  Abdomen:  soft, nontender, nondistended, normal bowel sounds, no masses, hepatomegaly, splenomegaly  Extremities:  no edema, redness, tenderness in the calves  Skin: Noted to have gauze dressing over the chest port removal site. Does not look infected at this time. Assessment:        Hospital Problems           Last Modified POA    * (Principal) Sepsis ([address] 4/1/2020 Yes    Essential hypertension 4/1/2020 Yes    Chronic systolic congestive heart failure ([address] 4/1/2020 Yes    Overview Signed 6/8/2018  8:18 PM by Wendel Frankel, MD     Lipomatous right atrium            Primary adenocarcinoma of right lung ([address] 4/1/2020 Yes    Hyponatremia 4/1/2020 Yes    Diffuse follicle center lymphoma ([address] 4/1/2020 Yes    Pancytopenia ([address] 4/1/2020 Yes    MRSA bacteremia 4/1/2020 Yes    DANITA (acute kidney injury) ([address] 4/1/2020 Yes    Incomplete bladder emptying 4/3/2020 Yes    Bilateral renal cysts 4/3/2020 Yes          Plan:        MRSA septicemia  -2 out of 2 blood cultures positive for MRSA  -Continue vancomycin for total of 3 weeks  -PICC line placed  -Follow-up on vancomycin trough levels as an outpatient    Hyponatremia  -Secondary to SIADH  -Resolved  -Nephrology on board    Urinary retention  -On Flomax  -Urology on board  -If patient continues to require straight catheterization, reach out to urology for instructions on discharge    Prerenal azotemia  -Resolved    Thrombocytopenia  -Stable  -Continue to monitor    DVT prophylaxis with Lovenox  PT OT on board  Disposition: Will require IV antibiotics upon discharge patient already has a PICC line. Awaiting PT OT evaluation.     Keisha Allan MD  4/3/2020  12:47 PM

## 2020-04-04 NOTE — DISCHARGE SUMMARY
going home with IV antibiotics for 3 weeks. Patient was advised to follow-up with her PCP and infectious disease doctor as well as her oncologist as an outpatient. She will have trough levels drawn for vancomycin which should be sent to her PCP and her infectious disease doctor. Currently patient denies any headache, dizziness, acute changes with her vision or hearing, nausea, vomiting, chest pain, shortness of breath, abdominal pain, constipation, diarrhea or any trouble with her urination. Physical exam  General alert awake oriented x3 no acute distress  HEENT normocephalic atraumatic PERRLA EOMI  CVS regular rate rhythm normal S1-S2 no murmurs auscultated  Clear to auscultation bilaterally no wheezing appreciated  Abdomen soft nontender nondistended active bowel sound  Extremities range of motion intact  Neurological exam grossly intact      Significant therapeutic interventions:  Iv vancomycin    Significant Diagnostic Studies:   Labs / Micro:  CBC:   Lab Results   Component Value Date    WBC 5.7 04/04/2020    RBC 3.04 04/04/2020    HGB 8.5 04/04/2020    HCT 27.1 04/04/2020    MCV 89.1 04/04/2020    MCH 28.0 04/04/2020    MCHC 31.4 04/04/2020    RDW 17.7 04/04/2020    PLT See Reflexed IPF Result 04/04/2020     BMP:    Lab Results   Component Value Date    GLUCOSE 117 04/04/2020     04/04/2020    K 4.4 04/04/2020     04/04/2020    CO2 24 04/04/2020    ANIONGAP 11 04/04/2020    BUN 12 04/04/2020    CREATININE 0.66 04/04/2020    BUNCRER NOT REPORTED 04/04/2020    CALCIUM 8.7 04/04/2020    LABGLOM >60 04/04/2020    GFRAA >60 04/04/2020    GFR      04/04/2020    GFR NOT REPORTED 04/04/2020     HFP:    Lab Results   Component Value Date    PROT 5.7 04/02/2020     CMP:    Lab Results   Component Value Date    GLUCOSE 117 04/04/2020     04/04/2020    K 4.4 04/04/2020     04/04/2020    CO2 24 04/04/2020    BUN 12 04/04/2020    CREATININE 0.66 04/04/2020    ANIONGAP 11 04/04/2020    ALKPHOS 62

## 2020-04-05 NOTE — TELEPHONE ENCOUNTER
Call received to contact daughter regarding patient. Spoke with daughter at length. She lives in [place] and is expressing concern about her mother's condition. Patient was discharged from [address] [place] [address] yesterday. Daughter states that patient lives alone and a friend who sometimes assists patient called her this AM and reported patient in a lot of pain, had been incontinent and seemed to be struggling. Daughter tearful on phone. She did say that prior to her recent hospital stay the apartment had been poorly maintained with old food and a generally unclean environment. Review of d/c summary from hospital did not specify home care agency  Daughter will contact her brother who lives locally to check on patient and see if home care agency contact info is available. Daughter called back, [facility] is the agency and the patient said she was told they would not be out until tomorrow. She is to receive IV Vancomycin for sepsis. I soke with Belinda, they will open her case today  [employer] is supplying the IV supplies and Belinda informed me the supplies were just delivered while patient's son was still present. They will contact the daughter and up date her that will care will start today. They will call me back if any futher orders/assistance is needed today, will contact office in AM if nothing required tonight.

## 2020-04-05 NOTE — PROGRESS NOTES
alert and cooperative   Eyes - pupils equal and reactive, extraocular eye movements intact   Ears - bilateral TM's and external ear canals normal   Mouth - mucous membranes moist, pharynx normal without lesions   Neck - supple, no significant adenopathy   Lymphatics - no palpable lymphadenopathy, no hepatosplenomegaly   Chest - clear to auscultation, no wheezes, rales or rhonchi, symmetric air entry   Heart - normal rate, regular rhythm, normal S1, S2, no murmurs  Abdomen - soft, nontender, nondistended, no masses or organomegaly   Neurological - alert, oriented, normal speech, no focal findings or movement disorder noted   Musculoskeletal - no joint tenderness, deformity or swelling   Extremities - peripheral pulses normal, no pedal edema, no clubbing or cyanosis   Skin - normal coloration and turgor, no rashes, no suspicious skin lesions noted ,  Port area, the port was removed, dressing is applied.   There is redness and swelling and tenderness suggestive of a port site infection  DATA:    Labs:   CBC:   Recent Labs     04/03/20  0642 04/04/20  0341   WBC 6.3 5.7   HGB 9.9* 8.5*   HCT 29.5* 27.1*   * See Reflexed IPF Result     BMP:   Recent Labs     04/03/20  2224 04/04/20  0341    135   K 3.5* 4.4   CO2 21 24   BUN 11 12   CREATININE 0.66 0.66   LABGLOM >60 >60   GLUCOSE 128* 117*     PT/INR:   Recent Labs     04/02/20  0349   PROTIME 9.6   INR 0.9       IMAGING DATA:      Primary Problem  Sepsis [facility])    Active Hospital Problems    Diagnosis Date Noted    Incomplete bladder emptying [R33.9] 04/03/2020    Bilateral renal cysts [N28.1] 04/03/2020    Sepsis ([facility] [A41.9] 95/59/8214    Diffuse follicle center lymphoma ([facility] [facility] [C82.50] 03/31/2020    Pancytopenia ([facility] [D61.818] 03/31/2020    MRSA bacteremia [R78.81] 03/31/2020    DANITA (acute kidney injury) ([facility] [N17.9] 03/31/2020    Hyponatremia [E87.1] 03/30/2020    Primary adenocarcinoma of right lung ([facility] [C34.91] 01/31/2020    Essential

## 2020-04-06 NOTE — TELEPHONE ENCOUNTER
received call from Nurse Navigator stating patient had appointment on 4/8 and wasn't sure if she had scheduled ride with insurance or not.  called insurance provider and confirmed ride was scheduled.  called patient and left message updating her.  updated Nurse Navigator.

## 2020-04-08 NOTE — PROGRESS NOTES
Result Value Ref Range    Platelets 374 079 - 220 k/uL   Protime-INR   Result Value Ref Range    Protime 13.1 11.8 - 14.6 sec    INR 1.0                         Results for orders placed or performed during the hospital encounter of 88/42/07   Basic Metabolic Panel   Result Value Ref Range    Glucose 107 (H) 70 - 99 mg/dL    BUN 18 8 - 23 mg/dL    CREATININE 1.12 (H) 0.50 - 0.90 mg/dL    Bun/Cre Ratio NOT REPORTED 9 - 20    Calcium 8.2 (L) 8.6 - 10.4 mg/dL    Sodium 129 (L) 135 - 144 mmol/L    Potassium 4.4 3.7 - 5.3 mmol/L    Chloride 96 (L) 98 - 107 mmol/L    CO2 25 20 - 31 mmol/L    Anion Gap 8 (L) 9 - 17 mmol/L    GFR Non-African American 49 (L) >60 mL/min    GFR  59 (L) >60 mL/min    GFR Comment          GFR Staging NOT REPORTED    CBC Auto Differential   Result Value Ref Range    WBC 7.0 3.5 - 11.0 k/uL    RBC 2.99 (L) 4.0 - 5.2 m/uL    Hemoglobin 8.5 (L) 12.0 - 16.0 g/dL    Hematocrit 25.2 (L) 36 - 46 %    MCV 84.2 80 - 100 fL    MCH 28.6 26 - 34 pg    MCHC 33.9 31 - 37 g/dL    RDW 18.4 (H) 12.5 - 15.4 %    Platelets Platelet clumps present, count appears adequate.  140 - 450 k/uL    MPV NOT REPORTED 6.0 - 12.0 fL    NRBC Automated NOT REPORTED per 100 WBC    Differential Type NOT REPORTED     Immature Granulocytes NOT REPORTED 0 %    Absolute Immature Granulocyte NOT REPORTED 0.00 - 0.30 k/uL    WBC Morphology NOT REPORTED     RBC Morphology NOT REPORTED     Platelet Estimate NOT REPORTED     Seg Neutrophils 86 (H) 36 - 66 %    Lymphocytes 4 (L) 24 - 44 %    Monocytes 6 1 - 7 %    Eosinophils % 0 (L) 1 - 4 %    Basophils 0 0 - 2 %    Metamyelocytes 3 (H) 0 %    Myelocytes 1 (H) 0 %    Segs Absolute 6.02 1.8 - 7.7 k/uL    Absolute Lymph # 0.28 (L) 1.0 - 4.8 k/uL    Absolute Mono # 0.42 0.1 - 0.8 k/uL    Absolute Eos # 0.00 0.0 - 0.4 k/uL    Basophils Absolute 0.00 0.0 - 0.2 k/uL    Metamyelocytes Absolute 0.21 (H) 0 k/uL    Myelocytes Absolute 0.07 (H) 0 k/uL    Morphology TOXIC GRANULATION PRESENT     Morphology ANISOCYTOSIS PRESENT    Vancomycin, trough   Result Value Ref Range    Vancomycin Tr 9.6 (L) 10.0 - 20.0 ug/mL    Vancomycin Trough Dose amount NOT REPORTED     Vancomycin Trough Date last dose NOT REPORTED     Vancomycin Trough Time last dose NOT REPORTED          Impression:  Low-grade follicular lymphoma, RG42 positive, early stageI/II  Poorly differentiated adenocarcinoma of left lung, probably stage III  Port infection with MRSA bacteremia  Radiation esophagitis  Mediastinal lymphadenopathy, PET positive, negative on biopsy  Peripheral vascular disease  History of cocaine abuse  COPD  Lipomatous hypertrophy of inter-atrial septum  History of tobacco dependence, quit in January 2019    Plan:  The patient was in house recently and I reviewed the notes and discussed with the patient. Clinically she is weak, I am ordering updated lab work to be done today and we may consider IV hydration today. Discussed treatment plan. Patient received 5 weekly treatments of carboplatin and Taxol however after that she got admitted. Patient is currently being treated with antibiotics for bacteremia with MRSA. I am concerned about challenging patient with cytotoxic drugs which may lower her immunity and worsen bacteremia. Therefore we decided to withhold cytotoxic therapy. Patient will unlikely receive any more chemo until antibiotics are completely done. In the meanwhile patient will be continuing radiation therapy. I am ordering Boost and a walker. I am refilling Norco.  I also plan to contact patient's oncologist in [facility] to discuss further treatment plan regarding consolidation immunotherapy given patient is being treated as a stage III unresectable disease. NCCN guidelines were reviewed and discussed with the patient. The diagnosis and care plan were discussed with the patient in detail.  I discussed the natural history of the disease, prognosis, risks and goals of therapy and answered all the patients questions to the best of my ability. Patient expressed understanding and was in agreement. Vaughn Elliott        This note is created with the assistance of a speech recognition program.  While intending to generate a document that actually reflects the content of the visit, the document can still have some errors including those of syntax and sound a like substitutions which may escape proof reading. It such instances, actual meaning can be extrapolated by contextual diversion. Angelo Ho

## 2020-04-08 NOTE — TELEPHONE ENCOUNTER
Name: Savanah Knight  : 1955  MRN: [id number]    Oncology Navigation Follow-Up Note    Contact Type:  Telephone  Notes: Pt called in stating \"my ride didn't show up and I don't know what to do\". Instructed pt writer will contact Logan [facility] . Spoke with Logan, updated on pt. Logan stated will look into & contact pt. Notified pt Logan currently looking into & writer will update Dr. Trinh Aguilar office. Aura Baez., [facility], updated on pt. Will continue to follow.     Electronically signed by Verito Cardenas RN on 2020 at 8:10 AM

## 2020-04-08 NOTE — PROGRESS NOTES
Patient seen by Dr. Gerard Au today, see his notes for visit details. Patient's chemo held, iv fluids 1000mls NS given as ordered.

## 2020-04-09 NOTE — PROGRESS NOTES
[address]            Radiation Oncology          [address] [facility], [address]        [place] Model: [phone]        F: [phone]       [email or web address]         Date of Service: 2020      Location:  [address],   [address] [facility], [place]   [phone]     RADIATION ONCOLOGY WEEKLY PROGRESS NOTE    Patient ID:   Argelia Martin  : 1955   MRN: [id number]    DIAGNOSIS:  Stage IIIA [id number] non-small adenocarcinoma of the left upper lobe lung     RADIATION THERAPY COURSE:   Treatment Site: left lung and mediastinal LN   Actual Dose: 4600cGy  Total Planned Dose: 6000cGy  Treatment technique: IMRT  Therapy imaging monitoring: CBCT daily  Concurrent Chemotherapy: Yes weekly Carbo/Taxol     SUBJECTIVE:   Patient is seen today for weekly on treatment evaluation.  She reports fatigue, but feels better now that she has been discharged. She had her infected port removed and is on IV antibiotics. Janette Mejía is doing well otherwise with no skin irritation, chest pain, SOB, coughing, or pain. OBJECTIVE:   ECO Symptomatic but completely ambulatory      Wt Readings from Last 5 Encounters:   20 186 lb (84.4 kg)   20 188 lb 0.8 oz (85.3 kg)   20 181 lb 14.4 oz (82.5 kg)   20 180 lb 12.8 oz (82 kg)   20 180 lb (81.6 kg)     GENERAL:  General appearance is that of a well-nourished, well-developed in no apparent distress. SKIN: No erythema or desquamation.     LABS:  WBC   Date Value Ref Range Status   2020 5.9 3.5 - 11.0 k/uL Final     Segs Absolute   Date Value Ref Range Status   2020 5.42 1.8 - 7.7 k/uL Final     Hemoglobin   Date Value Ref Range Status   2020 8.4 (L) 12.0 - 16.0 g/dL Final     Platelets   Date Value Ref Range Status   2020 170 140 - 450 k/uL Final       MEDICATIONS:    Current Outpatient Medications:     Nutritional Supplements (NUTRITIONAL SUPPLEMENT PLUS) clopidogrel (PLAVIX) 75 MG tablet, Take 75 mg by mouth daily, Disp: , Rfl:     aspirin 81 MG tablet, Take 81 mg by mouth daily, Disp: , Rfl:     amLODIPine (NORVASC) 10 MG tablet, TAKE ONE TABLET BY MOUTH EVERY DAY, Disp: 90 tablet, Rfl: 1    buPROPion (WELLBUTRIN SR) 150 MG extended release tablet, Take 1 tablet by mouth daily, Disp: 60 tablet, Rfl: 3  No current facility-administered medications for this encounter. Facility-Administered Medications Ordered in Other Encounters:     0.9 % sodium chloride infusion, 20 mL/hr, Intravenous, Once, [facility], MD    famotidine (PEPCID) injection 20 mg, 20 mg, Intravenous, Once, [facility], MD    diphenhydrAMINE (BENADRYL) injection 50 mg, 50 mg, Intravenous, Once, [facility] MD ISAIAH    dexamethasone (DECADRON) injection 10 mg, 10 mg, Intravenous, Once, [facility], MD    palonosetron (ALOXI) injection 0.25 mg, 0.25 mg, Intravenous, Once, [facility] MD ISAIAH    sodium chloride flush 0.9 % injection 10 mL, 10 mL, Intravenous, PRN, [facility], MD, 10 mL at 04/08/20 1426    heparin flush 100 UNIT/ML injection 500 Units, 500 Units, Intracatheter, PRN, [facility], MD      ASSESSMENT PLAN:   Treatment setup and plan reviewed. Port images/CBCT images reviewed. Appropriate laboratory work was reviewed. Treatment side effects and toxicities reviewed with the patient, and appropriate management was advised. Will continue radiation treatment as planned, and recommend patient contact us if they have any questions or concerns. Skin care was advised. Will continue RT without chemotherapy. Electronically signed by Abdirizak Collado MD on 4/8/2020 at 10:29 PM      Drugs Prescribed:  New Prescriptions    No medications on file       Other Orders Placed:  No orders of the defined types were placed in this encounter.

## 2020-04-09 NOTE — TELEPHONE ENCOUNTER
Name: Richard Tapia  : 1955  MRN: [id number]    Oncology Navigation Follow-Up Note    Contact Type:  Telephone  Notes: Spoke with Dr. Lisa Neville to inquire on f/u. Dr. Lisa Neville stated will contact Dr. Melanie Matthews, [facility], to update on pt & request VV f/u. Dr. Lisa Neville requested BMP completed 2020. Spoke with Dr. Katina Manriquez, updated on conversation with Dr. Lisa Neville & requested BMP completed prior to XRT on 2020. Will continue to follow.     Electronically signed by Rosemarie Kingsley RN on 2020 at 10:19 AM

## 2020-04-09 NOTE — TELEPHONE ENCOUNTER
Group 4/9/2020 1:53 PM Antonio Chan I. Female, 77 yrs, 1955, ,   MRN:   [id number]  the patient needs a PICC line. she is getting daptomycin. please call our office [phone] of 2 read     I spoke to Harvinder with the PICC TEAM she stated that yes they can do the picc tomorrow.   Called and spoke to [place] to [facility] and she stated that the patient can come into the ER regist. At 9 am  The patient and [facility] [place], [facility]. nurse Anamaria Chu was informed also

## 2020-04-10 PROBLEM — C34.10 MALIGNANT NEOPLASM OF UPPER LOBE OF LUNG (HCC): Status: ACTIVE | Noted: 2020-01-01

## 2020-04-13 NOTE — TELEPHONE ENCOUNTER
Pharmacy faxed office asking for refill of Losartan 50 mg. Per pt chart, the pt is taking Losartan-HCTZ as well. Is pt taking both medication or are they only taking one or the other. Please advise.

## 2020-04-13 NOTE — TELEPHONE ENCOUNTER
Pt called to cancel her RT for today because of \"bad diarrhea\". I offered to have nurse call her regarding diarrhea, she stated \"no, ill be there tomorrow\". Lashawn and RN notified.

## 2020-04-14 PROBLEM — J18.9 PNEUMONIA: Status: ACTIVE | Noted: 2020-01-01

## 2020-04-14 NOTE — ED PROVIDER NOTES
(PLAVIX) 75 MG TABLET    Take 75 mg by mouth daily    HYDROCODONE-ACETAMINOPHEN (NORCO) 5-325 MG PER TABLET    Take 1 tablet by mouth every 6 hours as needed for Pain for up to 15 days. LIDOCAINE VISCOUS HCL (XYLOCAINE) 2 % SOLN SOLUTION    SWISH AND SPIT 10 MLS FOUR TIMES DAILY AS NEEDED FOR IRRITATION    LIDOCAINE-PRILOCAINE (EMLA) 2.5-2.5 % CREAM    Apply topically as needed. LOSARTAN-HYDROCHLOROTHIAZIDE (HYZAAR) 100-12.5 MG PER TABLET    Take 1 tablet by mouth daily    NUTRITIONAL SUPPLEMENTS (ENSURE ORIGINAL) LIQD    Take 1 Bottle by mouth 3 times daily as needed (replacement)    NUTRITIONAL SUPPLEMENTS (NUTRITIONAL SUPPLEMENT PLUS) LIQD    Take 2 Cans by mouth 2 times daily    OMEPRAZOLE (PRILOSEC) 20 MG DELAYED RELEASE CAPSULE    Take 1 capsule by mouth once daily    ONDANSETRON (ZOFRAN-ODT) 8 MG TBDP DISINTEGRATING TABLET    Place 1 tablet under the tongue 3 times daily as needed for Nausea or Vomiting    OXYGEN    Inhale 2 L into the lungs daily as needed    SERTRALINE (ZOLOFT) 100 MG TABLET    Take 1.5 tablets by mouth daily    SODIUM CHLORIDE 0.9 % SOLN 250 ML WITH VANCOMYCIN 1 G SOLR 1,250 MG    Infuse 1,250 mg intravenously Q18H for 20 days    SYMBICORT 160-4.5 MCG/ACT AERO    INHALE 1 PUFF BY MOUTH AS DIRECTED TWO TIMES A DAY    TAMSULOSIN (FLOMAX) 0.4 MG CAPSULE    Take 1 capsule by mouth daily    TRAZODONE (DESYREL) 50 MG TABLET    TAKE ONE TABLET BY MOUTH DAILY AT BEDTIME       ALLERGIES     Patient has no known allergies.     FAMILY HISTORY       Family History   Problem Relation Age of Onset    Heart Disease Mother     High Blood Pressure Mother     Cancer Mother         cervix    Heart Disease Father     High Blood Pressure Father     Stroke Father     Cancer Sister         cervix    Stroke Sister     Cancer Paternal Grandfather         lung    Hypertension Brother     No Known Problems Sister           SOCIAL HISTORY       Social History     Socioeconomic History    Marital status:      Spouse name: None    Number of children: None    Years of education: None    Highest education level: None   Occupational History    None   Social Needs    Financial resource strain: None    Food insecurity     Worry: None     Inability: None    Transportation needs     Medical: None     Non-medical: None   Tobacco Use    Smoking status: Former Smoker     Packs/day: 0.25     Years: 55.00     Pack years: 13.75     Types: Cigarettes     Start date: 1963     Last attempt to quit: 2019     Years since quittin.2    Smokeless tobacco: Never Used   Substance and Sexual Activity    Alcohol use: No    Drug use: Not Currently     Types: Cocaine, Marijuana     Comment: cocaine clean date; May, 2001. Patient smokes Marijuana currently prn for back pain    Sexual activity: Not Currently   Lifestyle    Physical activity     Days per week: None     Minutes per session: None    Stress: None   Relationships    Social connections     Talks on phone: None     Gets together: None     Attends Cheondoism service: None     Active member of club or organization: None     Attends meetings of clubs or organizations: None     Relationship status: None    Intimate partner violence     Fear of current or ex partner: None     Emotionally abused: None     Physically abused: None     Forced sexual activity: None   Other Topics Concern    None   Social History Narrative    None       SCREENINGS             PHYSICAL EXAM    (up to 7 for level 4, 8 or more for level 5)     ED Triage Vitals [20 1622]   BP Temp Temp Source Pulse Resp SpO2 Height Weight   (!) 167/119 98.2 °F (36.8 °C) Oral 107 22 92 % 5' 4\" (1.626 m) 175 lb (79.4 kg)       Physical Exam  Vitals signs reviewed. Constitutional:       General: She is not in acute distress. Appearance: She is ill-appearing. HENT:      Head: Normocephalic.       Right Ear: External ear normal.      Left Ear: External ear normal.      Nose: Nose Monocytes 11 (*)     Metamyelocytes 1 (*)     Absolute Lymph # 0.32 (*)     Metamyelocytes Absolute 0.05 (*)     All other components within normal limits   BRAIN NATRIURETIC PEPTIDE - Abnormal; Notable for the following components:    Pro-BNP 5,846 (*)     All other components within normal limits   BASIC METABOLIC PANEL - Abnormal; Notable for the following components:    CREATININE 1.38 (*)     Calcium 8.5 (*)     Sodium 132 (*)     Potassium 3.5 (*)     Chloride 96 (*)     GFR Non- 38 (*)     GFR  46 (*)     All other components within normal limits   HEPATIC FUNCTION PANEL - Abnormal; Notable for the following components:    Alb 2.6 (*)     Albumin/Globulin Ratio 0.7 (*)     All other components within normal limits   C-REACTIVE PROTEIN - Abnormal; Notable for the following components:    .0 (*)     All other components within normal limits   TROPONIN - Abnormal; Notable for the following components:    Troponin, High Sensitivity 34 (*)     All other components within normal limits   CK - Abnormal; Notable for the following components: Total CK 17 (*)     All other components within normal limits   CULTURE, BLOOD 1   CULTURE, BLOOD 1   PROTIME-INR   APTT   MAGNESIUM   CK-MB   MYOGLOBIN, SERUM   LACTATE, SEPSIS   RETICULOCYTES   URINE RT REFLEX TO CULTURE   LACTATE DEHYDROGENASE   LACTATE, SEPSIS   TROPONIN       All other labs were within normal range ornot returned as of this dictation.     EMERGENCY DEPARTMENT COURSE and DIFFERENTIAL DIAGNOSIS/MDM:   Vitals:    Vitals:    04/14/20 1622   BP: (!) 167/119   Pulse: 107   Resp: 22   Temp: 98.2 °F (36.8 °C)   TempSrc: Oral   SpO2: 92%   Weight: 79.4 kg (175 lb)   Height: 5' 4\" (1.626 m)       ED Course as of Apr 14 1813   Tue Apr 14, 2020   [address] [facility] Access contacted to initiate transfer to [facility].    [SH]      ED Course User Index  [SH] Amanuel Doshi MD       Workup is significant for normal wbc count with

## 2020-04-15 PROBLEM — N18.30 STAGE 3 CHRONIC KIDNEY DISEASE (HCC): Status: ACTIVE | Noted: 2020-01-01

## 2020-04-15 NOTE — DISCHARGE INSTR - COC
Continuity of Care Form    Patient Name: Camden Neves   :  1955  MRN:  [id number]    Admit date:  2020  Discharge date:  2020    Code Status Order: Full Code   Advance Directives:   [facility] Documentation     Date/Time Healthcare Directive Type of Healthcare Directive Copy in [facility] [address] Agent's Name Healthcare Agent's Phone Number    20 1627  Yes, patient has an advance directive for healthcare treatment -- -- -- -- --          Admitting Physician:  Cecy Sparks MD  PCP: Jaqueline Guthrie DO    Discharging Nurse: Jean Mota. [address] Unit/Room#: 2041/2041-01  Discharging Unit Phone Number: [phone]    Emergency Contact:   Extended Emergency Contact Information  Primary Emergency Contact: Tim Ny, [address] [facility] Phone: [phone]  Relation: Child  Secondary Emergency Contact: [employer] Phone: [phone]  Relation: Child    Past Surgical History:  Past Surgical History:   Procedure Laterality Date    ANKLE SURGERY Right     broken ankle with hardware inserted and later removed    APPENDECTOMY      BONE MARROW BIOPSY Left 2019    pelvic, benign    BRONCHOSCOPY N/A 2020    EBUS WITH STATION 4L LYMPH NODE FNA performed by Slade Chavez MD at [address] [facility] [facility]      cardiac cath, no stents    FINE NEEDLE ASPIRATION Right 2019    lymph nodes, cancer found, follicular    HC  PICC [facility] DOUBLE  4/3/2020         [facility].  PICC [address] [facility] DOUBLE  4/10/2020         HYSTERECTOMY, TOTAL ABDOMINAL  1982    with BSO    OTHER SURGICAL HISTORY      blood clot extraction from under bladder, angiogram to remove bloot clot and inserted \"something to keep the vein open\" ?stent?     OTHER SURGICAL HISTORY  2020    ENDOBRONCHIAL ULTRASOUND WITH STATION 4L LYMPH NODE FNA    SHOULDER SURGERY Right     rotator cuff    TONSILLECTOMY         Immunization History:   Immunization History   Administered Date(s) education and monitoring. Resume previous services as prior to admission. PICC Line Care Per Protocol. IV DAPTOMYCIN, 500 MG, EVERY 48 HOURS X 12 DAYS AS PRIOR TO ADMISSION. NEXT DOSE DUE ON 4/20/20, LABS; AS PRIOR TO ADMISSION, PER DR. DUPONT'S ORDERS. Patient's personal belongings (please select all that are sent with patient):  None    RN SIGNATURE:  Electronically signed by Kristofer Kaplan RN on 4/18/20 at 1:57 PM EDT    CASE MANAGEMENT/SOCIAL WORK SECTION    Inpatient Status Date: 4/14/2020    Readmission Risk Assessment Score:  Readmission Risk              Risk of Unplanned Readmission:        29           Discharging to Facility/ . [address]  [address] [address]  Phone [phone]  Fax  [phone]    [address] [facility] [place] [facility]\"" [address]  [facility], [address] [place] [address]  P:  [phone]  F:  [phone]      / signature: Electronically signed by Scott Farias RN on 4/15/20 at 1:37 PM EDT    PHYSICIAN SECTION    Prognosis: Good    Condition at Discharge: Stable    Rehab Potential (if transferring to Rehab): Good    Recommended Labs or Other Treatments After Discharge:     Physician Certification: I certify the above information and transfer of Zak Atkins  is necessary for the continuing treatment of the diagnosis listed and that she requires Home Care for greater 30 days.      Update Admission H&P: No change in H&P    PHYSICIAN SIGNATURE:  Electronically signed by Aura Simmons MD on 4/17/20 at 2:12 PM EDT

## 2020-04-15 NOTE — H&P
DAKOTA Shaw 51    HISTORY AND PHYSICAL EXAMINATION            Date:   4/15/2020  Patient name:  Becky Fajardo  Date of admission:  4/14/2020 10:38 PM  MRN:   [id number]  Account:  [de-identified]  YOB: 1955  PCP:    Sera Malone DO  Room:   2041/2041-01  Code Status:    Full Code    Chief Complaint:     No chief complaint on file. Positive for fever, cough    History Obtained From:     patient, electronic medical record    History of Present Illness: The patient is a 77 y.o. Non-/non  female who presents with No chief complaint on file. and she is admitted to the hospital for the management of  SOB, Fever   The patient is a 40-year-old  female, with a past medical history of adenocarcinoma of the left lung, cardiomyopathy, chronic systolic CHF, HTN, hyperlipidemia, malignant tumor of head and neck, mass on parotid gland, MRSA bacteremia, and primary adenocarcinoma of the right lung, who presents as a direct admission from [facility] the ED for treatment of pneumonia, rule out COVID-19. According to ED documentation, patient presented to oncology department for her daily treatment and reported \"feeling like crap,\" with worsening weakness, fatigue, and shortness of breath. Labs were reviewed and patient was sent to the ED for evaluation of dehydration and possible need for transfusion. .    patient was complaining of increased shortness of breath (currently being treated with daily radiation for history of lung cancer) and anemia, with hemoglobin 7.2. Patient's chemotherapy was recently discontinued due to a port infection. Patient was hospitalized at [facility] from March 31 to April 4 for sepsis. At that time, 2 of 2 blood cultures were positive for MRSA. Patient was discharged home on IV vancomycin every 18 hours for 20 days, via a PICC line.       , CXR showed mild patchy airspace disease bilaterally. AERO INHALE 1 PUFF BY MOUTH AS DIRECTED TWO TIMES A DAY 11/1/19  Yes Zhane Medhkour, DO   traZODone (DESYREL) 50 MG tablet TAKE ONE TABLET BY MOUTH DAILY AT BEDTIME 10/2/19  Yes Zhane Medhkour, DO   sertraline (ZOLOFT) 100 MG tablet Take 1.5 tablets by mouth daily  Patient taking differently: Take 200 mg by mouth daily  9/6/19  Yes Zhane Medhkour, DO   clopidogrel (PLAVIX) 75 MG tablet Take 75 mg by mouth daily   Yes Historical Provider, MD   aspirin 81 MG tablet Take 81 mg by mouth daily   Yes Historical Provider, MD   amLODIPine (NORVASC) 10 MG tablet TAKE ONE TABLET BY MOUTH EVERY DAY 1/4/19  Yes Zaria Freedman MD   buPROPion (WELLBUTRIN SR) 150 MG extended release tablet Take 1 tablet by mouth daily 7/13/17  Yes Zaria Freedman MD   sodium chloride 0.9 % SOLN 250 mL with vancomycin 1 g SOLR 1,250 mg Infuse 1,250 mg intravenously Q18H for 20 days 4/5/20 4/25/20  Samantha Miguel MD   albuterol sulfate  (90 Base) MCG/ACT inhaler Inhale 2 puffs into the lungs every 6 hours as needed for Wheezing 1/9/20 4/13/20  Peng Rachel MD        Allergies:     Patient has no known allergies. Social History:     Tobacco:    reports that she quit smoking about 15 months ago. Her smoking use included cigarettes. She started smoking about 56 years ago. She has a 13.75 pack-year smoking history. She has never used smokeless tobacco.  Alcohol:      reports no history of alcohol use. Drug Use:  reports previous drug use. Drugs: Cocaine and Marijuana.     Family History:     Family History   Problem Relation Age of Onset    Heart Disease Mother     High Blood Pressure Mother     Cancer Mother         cervix    Heart Disease Father     High Blood Pressure Father     Stroke Father     Cancer Sister         cervix    Stroke Sister     Cancer Paternal Grandfather         lung    Hypertension Brother     No Known Problems Sister        Review of Systems:     Positive and Negative as described in therapies and most importantly because of direct risk to patient if care not provided in a hospital setting. Tor Garcia MD  4/15/2020  5:21 PM    Copy sent to Dr. Peewee Bush, DO    Please note that this chart was generated using voice recognition Dragon dictation software. Although every effort was made to ensure the accuracy of this automated transcription, some errors in transcription may have occurred.

## 2020-04-15 NOTE — CONSULTS
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 She was diagnosed with this last year and has  undergone chemo and radiation therapy. She had a port that was infected  and recently removed at [facility]. She was started on antibiotics and  discharged to home. The cultures grew out MRSA bacteremia. The patient  subsequently went to the [facility] ER with complaints of chills,  generalized malaise and weakness. She has had mild shortness of breath. She denies any cough. She denies any hemoptysis. She denies any  pleurisy or night sweats. They did not document in the ER at [facility]  if she was desaturating. Her oxygen saturation documented was 92% on 2  liters. ALLERGIES:  No known drug allergies. MEDICATIONS:  List was reviewed. PAST MEDICAL HISTORY:  Significant for lung cancer as documented above. She was hospitalized at [facility] from  to  for MRSA sepsis  and her port was removed. PICC line was placed. .  She has a history of  coronary artery disease. She has COPD and is on home oxygen. She has  congestive heart failure. She has hypertension. SOCIAL HISTORY:  She was a heavy smoker. She smoked two packs a day for  many years and then switched to one pack a day. Her total pack-year  history is about 55 years and she quit right before she was diagnosed  with lung cancer. FAMILY HISTORY:  Noncontributory.     REVIEW OF

## 2020-04-15 NOTE — PROGRESS NOTES
receiving vancomycin as an outpatient unsure of last dose and time. Vancomycin level was ordered - result <4.0 at 0048 on 4/15/2020. Will start vancomycin 2000 mg x 1 dose followed by vancomycin 1250 mg every 24 hours. Timing of trough level will be determined based on culture results, renal function, and clinical response. Thank you for the consult. Will continue to follow.      Trevor Lamas [facility]     -4/15/2020 at 2:22 AM

## 2020-04-15 NOTE — PROGRESS NOTES
The patient is a 57-year-old  female, with a past medical history of adenocarcinoma of the left lung, cardiomyopathy, chronic systolic CHF, HTN, hyperlipidemia, malignant tumor of head and neck, mass on parotid gland, MRSA bacteremia, and primary adenocarcinoma of the right lung, who presents as a direct admission from [facility] the ED for treatment of pneumonia, rule out COVID-19. According to ED documentation, patient presented to oncology department for her daily treatment and reported \"feeling like crap,\" with worsening weakness, fatigue, and shortness of breath. Labs were reviewed and patient was sent to the ED for evaluation of dehydration and possible need for transfusion. .    According to ED notes, patient was complaining of increased shortness of breath (currently being treated with daily radiation for history of lung cancer) and anemia, with hemoglobin 7.2. Patient's chemotherapy was recently discontinued due to a port infection. Patient was hospitalized at [facility] from March 31 to April 4 for sepsis. At that time, 2 of 2 blood cultures were positive for MRSA. Patient was discharged home on IV vancomycin every 18 hours for 20 days, via a PICC line. Today, CXR showed mild patchy airspace disease bilaterally. Atypical and viral pneumonia are not excluded. Patient was transferred to this facility for concern of COVID-19. ED labs as follows:  .0, , procalcitonin 10.86 and d-dimer 0.43. Creatinine 1.38, with baseline near 0.7. Pro BNP 5846, troponin high-sensitivity 34. WBC 4.5 with lymphocyte count of 7 and absolute lymphocyte 0.32. Patient received Zosyn 4.5 g in [facility] ED. We will continue 3.375 g every 8 hours over 4-hour infusion. COVID-19 swab ordered. Pharmacy consult to dose vancomycin for MRSA in the bloodstream.  Dr. Jossie Baker, infectious disease, and Dr. Kaden Troncoso, pulmonologist, consulted to see in a.m.

## 2020-04-16 NOTE — CONSULTS
[facility] -   Infectious diseases evaluation  admission date 4/14/2020        Impression :   Current:  · Shortness of breath multifactorial related to her COPD, CHF, lung cancer with a questionable underlying pneumonia with concern about COVID-19 infection  · History of lung cancer status post chemoradiation therapy  · Recent MRSA bacteremia secondary to infected port that was removed  · Coronary artery disease  · COPD on home oxygen  · Congestive heart failure  · Hypertension      Recommendations   · IV vancomycin and Zosyn for now  · COVID-19 PCR  · Follow blood cultures  · CBC, renal function and vancomycin level closely  · Droplet plus isolation  · Supportive care        History of Present Illness:   Initial history:  Savanah Knight is a 77y.o.-year-old female   Presented to [facility] ER with generalized weakness, shortness of breath and chills for 3 days with no reported cough. The patient was diagnosed with lung cancer in November 2019 received chemoradiation treatment. She was hospitalized recently with MRSA bacteremia thought to be related to infected port that was removed, PICC line was placed and was discharged on IV vancomycin on 4/4/2020 for 3 weeks. 4/14/2020 lactic acid was normal, , troponin 34, C-reactive protein 194, WBC 4.5 with 7% lymphocytes  Today ferritin 747, procalcitonin 0.24  Chest x-ray showed increased interstitial markings  Vancomycin trough level was less than 4 today  Interval changes  4/15/2020             I have personally reviewed the past medical history, past surgical history, medications, social history, and family history, and I haveupdated the database accordingly.   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 Review of Systems:     Review of Systems  As documented by Dr. Main Vazquez note from earlier today reviewed  Physical Examination :     Patient Vitals for the past 8 hrs:   BP Temp Temp src Pulse Resp SpO2   04/15/20 2015 -- 98.7 °F (37.1 °C) Oral 75 22 98 %   04/15/20 1645 (!) 155/84 -- -- 72 -- --       Physical Exam  As documented by Dr. Main Vazquez note from earlier today reviewed. Due to limited PPE during COVID-19 pandemic my physical exam was deferred. Medical Decision Making:   I have independently reviewed/ordered the following labs:    CBC with Differential:   Recent Labs     04/13/20  1045 04/14/20  1700   WBC 6.2 4.5   HGB 7.2* 7.2*   HCT 22.4* 22.2*    200   LYMPHOPCT 5* 7*   MONOPCT 9* 11*     BMP:  Recent Labs     04/14/20  1700 04/15/20  0640   * 135   K 3.5* 3.5*   CL 96* 98   CO2 25 26   BUN 13 12   CREATININE 1.38* 1.30*   MG 1.6 1.6     Hepatic Function Panel:   Recent Labs     04/14/20  1700 04/15/20  0640   PROT 6.6 6.6   LABALBU 2.6* 2.6*   BILIDIR 0.18  --    IBILI 0.17  --    BILITOT 0.35 0.38   ALKPHOS 70 73   ALT 9 10   AST 13 14     No results for input(s): RPR in the last 72 hours. No results for input(s): HIV in the last 72 hours. No results for input(s): BC in the last 72 hours. Lab Results   Component Value Date    CREATININE 1.30 04/15/2020    GLUCOSE 110 04/15/2020       Detailed results:         Thank you for allowing us to participate in the care of this patient. Please call with questions. This note is created with the assistance of a speech recognition program.  While intending to generate adocument that actually reflects the content of the visit, the document can still have some errors including those of syntax and sound a like substitutions which may escape proof reading. It such instances, actual meaningcan be extrapolated by contextual diversion.     Henri Matson MD  Office: [phone]  Perfect serve / office [phone]

## 2020-04-16 NOTE — PROGRESS NOTES
HYSTERECTOMY, TOTAL ABDOMINAL  1982    with BSO    OTHER SURGICAL HISTORY      blood clot extraction from under bladder, angiogram to remove bloot clot and inserted \"something to keep the vein open\" ?stent?  OTHER SURGICAL HISTORY  01/28/2020    ENDOBRONCHIAL ULTRASOUND WITH STATION 4L LYMPH NODE FNA    SHOULDER SURGERY Right     rotator cuff    TONSILLECTOMY          Medications Prior to Admission:     Prior to Admission medications    Medication Sig Start Date End Date Taking? Authorizing Provider   omeprazole (PRILOSEC) 20 MG delayed release capsule Take 1 capsule by mouth once daily 4/13/20  Yes Magdalena La MD   lidocaine viscous hcl (XYLOCAINE) 2 % SOLN solution SWISH AND SPIT 10 MLS FOUR TIMES DAILY AS NEEDED FOR IRRITATION 4/13/20  Yes Magdalena La MD   Nutritional Supplements (NUTRITIONAL SUPPLEMENT PLUS) LIQD Take 2 Cans by mouth 2 times daily 4/8/20  Yes Magdalena La MD   HYDROcodone-acetaminophen (NORCO) 5-325 MG per tablet Take 1 tablet by mouth every 6 hours as needed for Pain for up to 15 days. 4/8/20 4/23/20 Yes Magdalena La MD   tamsulosin [facility]) 0.4 MG capsule Take 1 capsule by mouth daily 4/5/20  Yes Maira Pineda MD   Nutritional Supplements (ENSURE ORIGINAL) LIQD Take 1 Bottle by mouth 3 times daily as needed (replacement) 3/24/20  Yes Zhane Medhkour, DO   losartan-hydrochlorothiazide (HYZAAR) 100-12.5 MG per tablet Take 1 tablet by mouth daily 3/6/20  Yes Zhane Medhkour, DO   lidocaine-prilocaine (EMLA) 2.5-2.5 % cream Apply topically as needed.  2/10/20  Yes Magdalena La MD   ondansetron (ZOFRAN-ODT) 8 MG TBDP disintegrating tablet Place 1 tablet under the tongue 3 times daily as needed for Nausea or Vomiting 2/10/20  Yes Magdalena La MD   OXYGEN Inhale 2 L into the lungs daily as needed   Yes Historical Provider, MD   atorvastatin (LIPITOR) 80 MG tablet daily  12/6/19  Yes Historical Provider, MD   carvedilol (COREG) 25 MG tablet TAKE ONE TABLET BY MOUTH TWICE A DAY WITH of Systems:     Positive and Negative as described in HPI. CONSTITUTIONAL:  Positive for Fatigue HEENT:  negative for vision, hearing changes, runny nose, throat pain  RESPIRATORY:  Positive for SOB   CARDIOVASCULAR:  negative for chest pain, palpitations. GASTROINTESTINAL:  negative for nausea, vomiting, diarrhea, constipation, change in bowel habits, abdominal pain   GENITOURINARY:  negative for difficulty of urination, burning with urination, frequency   INTEGUMENT:  negative for rash, skin lesions, easy bruising   HEMATOLOGIC/LYMPHATIC:  negative for swelling/edema   ALLERGIC/IMMUNOLOGIC:  negative for urticaria , itching  ENDOCRINE:  negative increase in drinking, increase in urination, hot or cold intolerance  MUSCULOSKELETAL:  negative joint pains, muscle aches, swelling of joints  NEUROLOGICAL:  negative for headaches, dizziness, lightheadedness, numbness, pain, tingling extremities  BEHAVIOR/PSYCH:  negative for depression, anxiety    Physical Exam:   BP (!) 128/97   Pulse 72   Temp 98.9 °F (37.2 °C)   Resp 20   Ht 5' 4\" (1.626 m)   Wt 146 lb 13.2 oz (66.6 kg)   SpO2 96%   BMI 25.20 kg/m²   Temp (24hrs), Av.9 °F (37.2 °C), Min:98.2 °F (36.8 °C), Max:99.9 °F (37.7 °C)    No results for input(s): POCGLU in the last 72 hours. Intake/Output Summary (Last 24 hours) at 2020 1931  Last data filed at 2020 1706  Gross per 24 hour   Intake 400 ml   Output 400 ml   Net 0 ml       General Appearance:  alert, well appearing, and in no acute distress  Mental status: oriented to person, place, and time with normal affect  Head:  normocephalic, atraumatic.   Eye: no icterus, redness, pupils equal and reactive, extraocular eye movements intact, conjunctiva clear  Ear: normal external ear, no discharge, hearing intact  Nose:  no drainage noted  Mouth: mucous membranes moist  Neck: supple, no carotid bruits, thyroid not palpable  Lungs:   Air entry B/l Decreased On oxygen  OCardiovascular: normal

## 2020-04-16 NOTE — PLAN OF CARE
Problem: Falls - Risk of:  Goal: Will remain free from falls  Description: Will remain free from falls  Outcome: Ongoing     Problem: Gas Exchange - Impaired:  Goal: Levels of oxygenation will improve  Description: Levels of oxygenation will improve  Outcome: Ongoing     Problem: Pain:  Goal: Pain level will decrease  Description: Pain level will decrease  Outcome: Ongoing

## 2020-04-16 NOTE — PROGRESS NOTES
Pulmonary Progress Note  [facility]      Patient - Srinivas Villarreal,  Age - 77 y.o.    - 1955      Room Number - -01   N -  [id number]   [facility]  Date of Admission -  2020 10:38 PM        Consulting Missy Tavarez MD  Primary Care Physician - Antwon Wright, DO     SUBJECTIVE   She feels better today    OBJECTIVE   VITALS    height is 5' 4\" (1.626 m) and weight is 146 lb 13.2 oz (66.6 kg). Her temperature is 98.9 °F (37.2 °C). Her blood pressure is 128/97 (abnormal) and her pulse is 72. Her respiration is 20 and oxygen saturation is 96%. Body mass index is 25.2 kg/m². Temperature Range: Temp: 98.9 °F (37.2 °C) Temp  Av.9 °F (37.2 °C)  Min: 98.2 °F (36.8 °C)  Max: 99.9 °F (37.7 °C)  BP Range:  Systolic (01CGH), IKY:721 , Min:128 , EUV:125     Diastolic (75ATI), TKP:96, Min:80, Max:97    Pulse Range: Pulse  Av.8  Min: 65  Max: 98  Respiration Range: Resp  Av  Min: 20  Max: 22  Current Pulse Ox[de-identified]  SpO2: 96 %  24HR Pulse Ox Range:  SpO2  Av.7 %  Min: 90 %  Max: 98 %  Oxygen Amount and Delivery: O2 Flow Rate (L/min): 4 L/min    Wt Readings from Last 3 Encounters:   04/15/20 146 lb 13.2 oz (66.6 kg)   20 175 lb (79.4 kg)   20 186 lb (84.4 kg)       I/O (24 Hours)    Intake/Output Summary (Last 24 hours) at 2020 9473  Last data filed at 2020 0824  Gross per 24 hour   Intake 250 ml   Output 400 ml   Net -150 ml       EXAM     General Appearance  Awake, alert, oriented, in no acute distress  HEENT - normocephalic, atraumatic.  []  Mallampati  [] Crowded airway   [] Macroglossia  []  Retrognathia  [] Micrognathia  []  Normal tongue size []  Normal Bite  [] [place] sign positive    Neck - Supple,  trachea midline   Lungs -diminished but clear no wheezes or rhonchi  Cardiovascular - Heart sounds are normal.  Regular rate and rhythm   Abdomen - Soft, nontender, nondistended, no masses or organomegaly  Neurologic - There are no focal motor or sensory deficits  Skin - No bruising or bleeding  Extremities - No clubbing, cyanosis, edema    MEDS      [START ON 4/17/2020] vancomycin  1,000 mg Intravenous Q24H    amLODIPine  10 mg Oral Daily    atorvastatin  80 mg Oral Daily    buPROPion  150 mg Oral Daily    carvedilol  25 mg Oral BID WC    clopidogrel  75 mg Oral Daily    pantoprazole  40 mg Oral QAM AC    sertraline  200 mg Oral Daily    budesonide-formoterol  1 puff Inhalation BID    tamsulosin  0.4 mg Oral Daily    traZODone  50 mg Oral Nightly    sodium chloride flush  10 mL Intravenous 2 times per day    heparin (porcine)  5,000 Units Subcutaneous 3 times per day    dextromethorphan  60 mg Oral 2 times per day    vancomycin (VANCOCIN) intermittent dosing (placeholder)   Other RX Placeholder    piperacillin-tazobactam (ZOSYN) 3.375 g in dextrose 5% IVPB extended infusion (mini-bag)  3.375 g Intravenous Q8H    albuterol sulfate HFA  2 puff Inhalation 4x daily       albuterol sulfate HFA, HYDROcodone-acetaminophen, lidocaine viscous hcl, sodium chloride flush, polyethylene glycol, promethazine **OR** ondansetron, benzonatate, potassium chloride **OR** potassium alternative oral replacement **OR** potassium chloride    LABS   CBC   Recent Labs     04/16/20  0612   WBC 7.0   HGB 7.3*   HCT 22.0*   MCV 84.7        BMP:   Lab Results   Component Value Date     04/16/2020    K 4.0 04/16/2020     04/16/2020    CO2 27 04/16/2020    BUN 11 04/16/2020    LABALBU 2.7 04/16/2020    CREATININE 1.52 04/16/2020    CALCIUM 8.6 04/16/2020    GFRAA 41 04/16/2020    LABGLOM 34 04/16/2020     ABGs:No results found for: PHART, PO2ART, VTS8XLH   Lab Results   Component Value Date    MODE NOT REPORTED 04/03/2020     Ionized Calcium:  No results found for: IONCA  Magnesium:    Lab Results   Component Value Date    MG 1.6 04/15/2020     Phosphorus:    Lab Results

## 2020-04-16 NOTE — PROGRESS NOTES
sertraline  200 mg Oral Daily    budesonide-formoterol  1 puff Inhalation BID    tamsulosin  0.4 mg Oral Daily    traZODone  50 mg Oral Nightly    sodium chloride flush  10 mL Intravenous 2 times per day    heparin (porcine)  5,000 Units Subcutaneous 3 times per day    dextromethorphan  60 mg Oral 2 times per day    vancomycin (VANCOCIN) intermittent dosing (placeholder)   Other RX Placeholder    piperacillin-tazobactam (ZOSYN) 3.375 g in dextrose 5% IVPB extended infusion (mini-bag)  3.375 g Intravenous Q8H    albuterol sulfate HFA  2 puff Inhalation 4x daily       Social History:     Social History     Socioeconomic History    Marital status:      Spouse name: Not on file    Number of children: Not on file    Years of education: Not on file    Highest education level: Not on file   Occupational History    Not on file   Social Needs    Financial resource strain: Not on file    Food insecurity     Worry: Not on file     Inability: Not on file    Transportation needs     Medical: Not on file     Non-medical: Not on file   Tobacco Use    Smoking status: Former Smoker     Packs/day: 0.25     Years: 55.00     Pack years: 13.75     Types: Cigarettes     Start date: 1963     Last attempt to quit: 2019     Years since quittin.2    Smokeless tobacco: Never Used   Substance and Sexual Activity    Alcohol use: No    Drug use: Not Currently     Types: Cocaine, Marijuana     Comment: cocaine clean date; May, 2001.   Patient smokes Marijuana currently prn for back pain    Sexual activity: Not Currently   Lifestyle    Physical activity     Days per week: Not on file     Minutes per session: Not on file    Stress: Not on file   Relationships    Social connections     Talks on phone: Not on file     Gets together: Not on file     Attends [employer] service: Not on file     Active member of club or organization: Not on file     Attends meetings of clubs or organizations: Not on file

## 2020-04-16 NOTE — PLAN OF CARE
Problem: Falls - Risk of:  Goal: Will remain free from falls  Description: Will remain free from falls  4/16/2020 1441 by Cheryl Anderson RN  Outcome: Ongoing  4/16/2020 0455 by Yan Palacios RN  Outcome: Ongoing  Goal: Absence of physical injury  Description: Absence of physical injury  4/16/2020 1441 by Cheryl Anderson RN  Outcome: Ongoing  4/16/2020 0455 by Yan Palacios RN  Outcome: Ongoing     Problem: Gas Exchange - Impaired:  Goal: Levels of oxygenation will improve  Description: Levels of oxygenation will improve  4/16/2020 1441 by Cheryl Anderson RN  Outcome: Ongoing  4/16/2020 0455 by Yan Palacios RN  Outcome: Ongoing     Problem: Pain:  Goal: Pain level will decrease  Description: Pain level will decrease  4/16/2020 1441 by Cheryl Anderson RN  Outcome: Ongoing  4/16/2020 0455 by Yan Palacios RN  Outcome: Ongoing  Goal: Control of acute pain  Description: Control of acute pain  4/16/2020 1441 by Cheryl Anderson RN  Outcome: Ongoing  4/16/2020 0455 by Yan Palacios RN  Outcome: Ongoing  Goal: Control of chronic pain  Description: Control of chronic pain  4/16/2020 1441 by Cheryl Anderson RN  Outcome: Ongoing  4/16/2020 0455 by Yan Palacios RN  Outcome: Ongoing   Electronically signed by Cheryl Anderson RN on 4/16/2020 at 2:41 PM

## 2020-04-16 NOTE — CARE COORDINATION
DISCHARGE PLANNING NOTE:    COVID NEGATIVE. Writer reviewed chart. Spoke with patient over the phone yesterday and she confirmed that plan is for home with [facility]. Follow[facility] at [facility] for hx lung CA. Active order for IV Zosyn/Vanco.    Will continue to follow for additional discharge needs.     Electronically signed by Jewels Lynn RN on 4/16/2020 at 11:35 AM

## 2020-04-17 NOTE — PROGRESS NOTES
1.4 04/02/2020        LIVER PROFILE   Recent Labs     04/14/20  1700  04/17/20  0636   AST 13   < > 14   ALT 9   < > 8   BILIDIR 0.18  --   --    BILITOT 0.35   < > 0.36   ALKPHOS 70   < > 78    < > = values in this interval not displayed. INR   Recent Labs     04/16/20  0612   INR 1.1     PTT   Lab Results   Component Value Date    APTT 26.8 04/14/2020         RADIOLOGY     (See actual reports for details)    ASSESSMENT/PLAN   Active Problems:    Essential hypertension    Chronic systolic congestive heart failure (HCC)    Depressive disorder    Malignant neoplasm of lung (HCC)    Adenocarcinoma of left lung (HCC)    Pneumonia    Stage 3 chronic kidney disease (HCC)  Resolved Problems:    * No resolved hospital problems.  *    COVID x2 negative  Blood cultures positive for MRSA  Transfer out of isolation and to progressive unit  Continue vancomycin  I have asked her to use incentive spirometry she does not like it  Placed on albuterol aerosols 4 times a day    Electronically signed by Prince Ramsey MD on 4/17/2020 at 2:15 PM

## 2020-04-17 NOTE — CARE COORDINATION
ONGOING DISCHARGE PLAN:    Spoke with patient via telephone regarding discharge plan and patient confirms that plan is still to go home with [facility]. Patient states she is ready to go home. COVID Negative x2    + BC for staph    Hx of Lung Cancer    Remains on IV cefepime, IV Vanco    Will continue to follow for additional discharge needs.     Electronically signed by Lisa Elena RN on 4/17/2020 at 1:22 PM

## 2020-04-17 NOTE — PROGRESS NOTES
[facility] -   Infectious diseases evaluation  admission date 4/14/2020        Impression :   Current:  · Shortness of breath multifactorial related to her COPD, CHF, lung cancer with a questionable underlying pneumonia  · History of lung cancer status post chemoradiation therapy  · Recent MRSA bacteremia secondary to infected port that was removed  · Coronary artery disease  · COPD on home oxygen  · Congestive heart failure  · Hypertension      Recommendations   · IV vancomycin   · IV cefepime  · Second COVID-19 PCR negative  · Follow blood cultures  · CBC, renal function and vancomycin level closely  · Follow procalcitonin level and chest x-ray          History of Present Illness:   Initial history:  Cecilio Damian is a 77y.o.-year-old female   Presented to [facility] ER with generalized weakness, shortness of breath and chills for 3 days with no reported cough. The patient was diagnosed with lung cancer in November 2019 received chemoradiation treatment. She was hospitalized recently with MRSA bacteremia thought to be related to infected port that was removed, PICC line was placed and was discharged on IV vancomycin on 4/4/2020 for 3 weeks. 4/14/2020 lactic acid was normal, , troponin 34, C-reactive protein 194, WBC 4.5 with 7% lymphocytes  Today ferritin 747, procalcitonin 0.24  Chest x-ray showed increased interstitial markings  Vancomycin trough level was less than 4 yesterday  Interval changes  4/17/2020   She continues to improve, still complaining of shortness of breath on 4 L oxygen per nasal cannula, mild cough, no other complaints. creatinine increased to 1.42          I have personally reviewed the past medical history, past surgical history, medications, social history, and family history, and I haveupdated the database accordingly.   Past Medical History:     Past Medical History:   Diagnosis Date    Arthritis     CAD (coronary artery Inhalation BID    tamsulosin  0.4 mg Oral Daily    traZODone  50 mg Oral Nightly    sodium chloride flush  10 mL Intravenous 2 times per day    heparin (porcine)  5,000 Units Subcutaneous 3 times per day    dextromethorphan  60 mg Oral 2 times per day    vancomycin (VANCOCIN) intermittent dosing (placeholder)   Other RX Placeholder    albuterol sulfate HFA  2 puff Inhalation 4x daily       Social History:     Social History     Socioeconomic History    Marital status:      Spouse name: Not on file    Number of children: Not on file    Years of education: Not on file    Highest education level: Not on file   Occupational History    Not on file   Social Needs    Financial resource strain: Not on file    Food insecurity     Worry: Not on file     Inability: Not on file    Transportation needs     Medical: Not on file     Non-medical: Not on file   Tobacco Use    Smoking status: Former Smoker     Packs/day: 0.25     Years: 55.00     Pack years: 13.75     Types: Cigarettes     Start date: 1963     Last attempt to quit: 2019     Years since quittin.2    Smokeless tobacco: Never Used   Substance and Sexual Activity    Alcohol use: No    Drug use: Not Currently     Types: Cocaine, Marijuana     Comment: cocaine clean date; May, 2001.   Patient smokes Marijuana currently prn for back pain    Sexual activity: Not Currently   Lifestyle    Physical activity     Days per week: Not on file     Minutes per session: Not on file    Stress: Not on file   Relationships    Social connections     Talks on phone: Not on file     Gets together: Not on file     Attends [employer] service: Not on file     Active member of club or organization: Not on file     Attends meetings of clubs or organizations: Not on file     Relationship status: Not on file    Intimate partner violence     Fear of current or ex partner: Not on file     Emotionally abused: Not on file     Physically abused: Not on file Forced sexual activity: Not on file   Other Topics Concern    Not on file   Social History Narrative    Not on file       Family History:     Family History   Problem Relation Age of Onset    Heart Disease Mother     High Blood Pressure Mother     Cancer Mother         cervix    Heart Disease Father     High Blood Pressure Father     Stroke Father     Cancer Sister         cervix    Stroke Sister     Cancer Paternal Grandfather         lung    Hypertension Brother     No Known Problems Sister         Allergies:   Patient has no known allergies. Review of Systems:     Review of Systems  As per history present illness other than above 14 systems reviewed were negative  Physical Examination :     Patient Vitals for the past 8 hrs:   BP Temp Temp src Pulse Resp SpO2   04/17/20 0730 (!) 187/94 98.6 °F (37 °C) Oral 82 16 97 %   04/17/20 0415 -- -- -- -- -- 97 %       Physical Exam  Constitutional:       Appearance: Normal appearance. HENT:      Head: Normocephalic and atraumatic. Eyes:      Conjunctiva/sclera: Conjunctivae normal.   Neck:      Musculoskeletal: No neck rigidity. Cardiovascular:      Rate and Rhythm: Normal rate and regular rhythm. Heart sounds: No murmur. Pulmonary:      Effort: Pulmonary effort is normal. No respiratory distress. Breath sounds: Normal breath sounds. Abdominal:      General: Abdomen is flat. There is no distension. Palpations: Abdomen is soft. Skin:     Findings: No rash. Neurological:      General: No focal deficit present. Mental Status: She is alert and oriented to person, place, and time.    Psychiatric:         Mood and Affect: Mood normal.     Chest port removed site with open wound no significant surrounding erythema or drainage no fluctuation    Medical Decision Making:   I have independently reviewed/ordered the following labs:    CBC with Differential:   Recent Labs     04/16/20  0612 04/17/20  0636   WBC 7.0 6.2   HGB 7.3* 7.8*

## 2020-04-17 NOTE — PLAN OF CARE
Problem: Falls - Risk of:  Goal: Will remain free from falls  Description: Will remain free from falls  4/17/2020 0347 by Amina Baca RN  Outcome: Ongoing  Note: Pt remained absent from falls. Call light within reach. Bed locked and in lowest position. Problem: Falls - Risk of:  Goal: Absence of physical injury  Description: Absence of physical injury  4/17/2020 0347 by Amina Baca RN  Outcome: Ongoing  Note: Patient remains free of injury. Safe environment maintained      Problem: Gas Exchange - Impaired:  Goal: Levels of oxygenation will improve  Description: Levels of oxygenation will improve  4/17/2020 0347 by Amina Baca RN  Outcome: Ongoing  Note: 98 - 100% on 4L O2     Problem: Pain:  Goal: Pain level will decrease  Description: Pain level will decrease  4/17/2020 0347 by Amina Baca RN  Outcome: Ongoing  Note: Pain assessed. Chronic chest pain. Medicated as ordered. Patient tolerating      Problem: Pain:  Goal: Control of acute pain  Description: Control of acute pain  4/17/2020 0347 by Amina Baca RN  Outcome: Ongoing  Note: Pain assessed. Chronic chest pain. Medicated as ordered. Patient tolerating      Problem: Pain:  Goal: Control of chronic pain  Description: Control of chronic pain  4/17/2020 0347 by Amina Baca RN  Outcome: Ongoing  Note: Pain assessed. Chronic chest pain. Medicated as ordered.  Patient tolerating

## 2020-04-17 NOTE — PROGRESS NOTES
RN rounded with Dr. Devyn Lee. Physician would like the patient moved to a different unit or discharged. As long as discharge is ok with Dr. Jordan Osborne.

## 2020-04-18 NOTE — PROGRESS NOTES
actual reports for details)    ASSESSMENT/PLAN     Patient Active Problem List   Diagnosis    Essential hypertension    Chronic systolic congestive heart failure (HCC)    Arthritis    Simple chronic bronchitis (HCC)    Closed fracture of distal end of right fibula    Injury of cervical spinal cord (HCC)    Depressive disorder    Primary adenocarcinoma of right lung ([address]    Malignant neoplasm of lung ([address]    Chronic systolic heart failure (HCC)    Adenocarcinoma of left lung (HCC)    Hyponatremia    Backache    Cardiomyopathy ([address]    Chest pain    Diffuse follicle center lymphoma ([address]    Follicular lymphoma ([address]    Hyperlipemia    Lung mass    Malignant tumor of head and neck ([address]    Mass of parotid gland    Nicotine dependence    Obesity, Class I, BMI 30-34.9    Pancytopenia (HCC)    MRSA bacteremia    DANITA (acute kidney injury) (HCC)    Nausea    Dehydration    Sepsis (HCC)    Incomplete bladder emptying    Bilateral renal cysts    Malignant neoplasm of upper lobe of lung (HCC)    Pneumonia    Stage 3 chronic kidney disease ([address]       #1. Patient is negative for coronavirus x2  MRSA bacteremia  #3. History of lung cancer  #4. Clinical diagnosis of COPD with chronic respiratory failure  #5. Personal history of severe tobacco abuse      On vancomycin and Maxipime. ID following  On albuterol nebulizer treatments for clinical diagnosis of   On heparin subcu   oxygen therapy  No need for steroids at this time.   Patient not bronchospastic    Following  electronically signed by Summer Herrera MD on 4/18/2020 at 8:49 AM

## 2020-04-18 NOTE — CARE COORDINATION
Administered    Influenza A (C5Y4-29) Vaccine PF IM 02/02/2010    Influenza Whole 11/12/2008    Influenza, Triv, inactivated, subunit, adjuvanted, IM (Fluad 65 yrs and older) 10/02/2019    Pneumococcal Conjugate 13-valent ([id number]) 07/13/2017    Pneumococcal Polysaccharide ([id number]) 12/03/2018       Active Problems:  Patient Active Problem List   Diagnosis Code    Essential hypertension I10    Chronic systolic congestive heart failure (HCC) I50.22    Arthritis M19.90    Simple chronic bronchitis (HCC) J41.0    Closed fracture of distal end of right fibula S82.831A    Injury of cervical spinal cord ([facility]) S14.109A    Depressive disorder F32.9    Primary adenocarcinoma of right lung ([facility] [address] [address] C34.91    Malignant neoplasm of lung (HCC) C34.90    Chronic systolic heart failure (HCC) I50.22    Adenocarcinoma of left lung (HCC) C34.92    Hyponatremia E87.1    Backache M54.9    Cardiomyopathy ([facility] [address] [facility] I42.9    Chest pain O46.1    Diffuse follicle center lymphoma (HCC) Z75.78    Follicular lymphoma (HCC) C82.90    Hyperlipemia E78.5    Lung mass R91.8    Malignant tumor of head and neck (HCC) C76.0    Mass of parotid gland K11.8    Nicotine dependence F17.200    Obesity, Class I, BMI 30-34.9 E66.9    Pancytopenia (HCC) D61.818    MRSA bacteremia R78.81    DANITA (acute kidney injury) ([facility]) N17.9    Nausea R11.0    Dehydration E86.0    Sepsis ([facility]) A41.9    Incomplete bladder emptying R33.9    Bilateral renal cysts N28.1    Malignant neoplasm of upper lobe of lung (HCC) C34.10    Pneumonia J18.9       Isolation/Infection:   Isolation          Droplet Plus        Patient Infection Status     Infection Onset Added Last Indicated Last Indicated By Review Planned Expiration Resolved Resolved By    COVID-19 Rule Out 04/15/20 04/15/20 04/15/20 COVID-19 (Ordered)        MRSA 03/30/20 04/01/20 04/01/20 Culture, Aerobic              Nurse Assessment:  Last Vital Signs: BP (!) 142/99   Pulse 68 tablet Refills: 1       buPROPion (WELLBUTRIN SR) 150 MG extended release tablet 150 mg   Take 1 tablet by mouth daily   Quantity: 60 tablet Refills: 3       albuterol sulfate  (90 Base) MCG/ACT inhaler 2 puffs   Inhale 2 puffs into the lungs every 6 hours as needed for Wheezing   Quantity: 1 Inhaler Refills: 6           STOP taking these previous medications     Medication Dose Reason for Stopping Comments   (STOP TAKING) sodium chloride 0.9 % SOLN 250 mL with vancomycin 1 g SOLR 1,250 mg 1,250 mg           Printing Report     Report ID Report Name Print   [id number] Discharge Meds Print

## 2020-04-18 NOTE — PLAN OF CARE
Problem: Falls - Risk of:  Goal: Will remain free from falls  Description: Will remain free from falls  4/18/2020 0312 by Nati Clarke RN  Outcome: Ongoing  4/17/2020 1841 by Johnnie Matamoros RN  Outcome: Ongoing  Goal: Absence of physical injury  Description: Absence of physical injury  4/18/2020 0312 by Nati Clarke RN  Outcome: Ongoing  4/17/2020 1841 by Johnnie Matamoros RN  Outcome: Ongoing     Problem: Gas Exchange - Impaired:  Goal: Levels of oxygenation will improve  Description: Levels of oxygenation will improve  4/18/2020 0312 by Nati Clarke RN  Outcome: Ongoing  4/17/2020 1841 by Johnnie Matamoros RN  Outcome: Ongoing     Problem: Pain:  Goal: Pain level will decrease  Description: Pain level will decrease  4/18/2020 0312 by Nati Clarke RN  Outcome: Ongoing  4/17/2020 1841 by Johnnie Matamoros RN  Outcome: Ongoing  Goal: Control of acute pain  Description: Control of acute pain  4/18/2020 0312 by Nati Clarke RN  Outcome: Ongoing  4/17/2020 1841 by Johnnie Matamoros RN  Outcome: Ongoing  Goal: Control of chronic pain  Description: Control of chronic pain  4/18/2020 0312 by Nati Clarke RN  Outcome: Ongoing  4/17/2020 1841 by Johnnie Matamoros RN  Outcome: Ongoing

## 2020-04-18 NOTE — PROGRESS NOTES
[address] 51    HISTORY AND PHYSICAL EXAMINATION            Date:   4/18/2020  Patient name:  Princess Rebolledo  Date of admission:  4/14/2020 10:38 PM  MRN:   [id number]  Account:  [de-identified]  YOB: 1955  PCP:    Opal Parker DO  Room:   2106/2106-01  Code Status:    Full Code    Chief Complaint:     No chief complaint on file. Positive for fever, cough    History Obtained From:     patient, electronic medical record    History of Present Illness: The patient is a 77 y.o. Non-/non  female who presents with No chief complaint on file. and she is admitted to the hospital for the management of  SOB, Fever   The patient is a 49-year-old  female, with a past medical history of adenocarcinoma of the left lung, cardiomyopathy, chronic systolic CHF, HTN, hyperlipidemia, malignant tumor of head and neck, mass on parotid gland, MRSA bacteremia, and primary adenocarcinoma of the right lung, who presents as a direct admission from [facility] the ED for treatment of pneumonia, rule out COVID-19. According to ED documentation, patient presented to oncology department for her daily treatment and reported \"feeling like crap,\" with worsening weakness, fatigue, and shortness of breath. Labs were reviewed and patient was sent to the ED for evaluation of dehydration and possible need for transfusion. .    patient was complaining of increased shortness of breath (currently being treated with daily radiation for history of lung cancer) and anemia, with hemoglobin 7.2. Patient's chemotherapy was recently discontinued due to a port infection. Patient was hospitalized at [facility] from March 31 to April 4 for sepsis. At that time, 2 of 2 blood cultures were positive for MRSA. Patient was discharged home on IV vancomycin every 18 hours for 20 days, via a PICC line.       , CXR showed mild patchy airspace disease bilaterally. Atypical and viral pneumonia are not excluded. Patient was transferred to this facility for concern of COVID-19. ED labs as follows:  .0, , procalcitonin 10.86 and d-dimer 0.43. Creatinine 1.38, with baseline near 0.7. Pro BNP 5846, troponin high-sensitivity 34. WBC 4.5 with lymphocyte count of 7 and absolute lymphocyte 0.32. Patient received Zosyn 4.5 g in [facility] ED. We will continue 3.375 g every 8 hours over 4-hour infusion. COVID-19 swab ordered.   Pharmacy consult to dose vancomycin for MRSA in the bloodstream.                 4/16   Patient feeling much better  On Zosyn and vancomycin        Past Medical History:     Past Medical History:   Diagnosis Date    Arthritis     CAD (coronary artery disease)     fatty pockets in the atrium, states \"30% beating\"    Cancer ([facility]     follicular lymphoma- 23/2045, lung     Chest discomfort     CHF (congestive heart failure) (HCC)     Chronic back pain     \"pinched columns\", smokes marijuana prn for back pain    COPD (chronic obstructive pulmonary disease) (HCC)     Cough     Depression     History of hepatitis age 21    Hx of blood clots     Hyperlipidemia     Hypertension     Dr. Hugh Layton Mediastinal lymphadenopathy     On home O2     all the time    SOB (shortness of breath)     Wears dentures     upper,does not wear lower    Wears glasses         Past Surgical History:     Past Surgical History:   Procedure Laterality Date    ANKLE SURGERY Right     broken ankle with hardware inserted and later removed    APPENDECTOMY      BONE MARROW BIOPSY Left 12/05/2019    pelvic, benign    BRONCHOSCOPY N/A 1/28/2020    EBUS WITH STATION 4L LYMPH NODE FNA performed by Carmen Gambino MD at [address] [facility] [[email or web address]      cardiac cath, no stents    FINE NEEDLE ASPIRATION Right 11/2019    lymph nodes, cancer found, follicular    HC  PICC [facility] DOUBLE  4/3/2020         HC  PICC [facility] DOUBLE  4/10/2020         or splenomegaly  Neurologic: There are no new focal motor or sensory deficits, normal muscle tone and bulk, no abnormal sensation, normal speech, cranial nerves II through XII grossly intact  Skin: No gross lesions, rashes, bruising or bleeding on exposed skin area  Extremities:  peripheral pulses palpable, no pedal edema or calf pain with palpation  Psych: normal affect     Investigations:      Laboratory Testing:  Recent Results (from the past 24 hour(s))   VANCOMYCIN, TROUGH    Collection Time: 04/18/20  1:39 AM   Result Value Ref Range    Vancomycin Tr 18.3 10.0 - 20.0 ug/mL    Vancomycin Trough Dose amount 1000MG     Vancomycin Trough Date last dose 4,172,020     Vancomycin Trough Time last dose 332    CBC auto differential    Collection Time: 04/18/20  1:39 AM   Result Value Ref Range    WBC 5.6 3.5 - 11.0 k/uL    RBC 2.55 (L) 4.0 - 5.2 m/uL    Hemoglobin 7.1 (L) 12.0 - 16.0 g/dL    Hematocrit 21.8 (L) 36 - 46 %    MCV 85.3 80 - 100 fL    MCH 28.0 26 - 34 pg    MCHC 32.8 31 - 37 g/dL    RDW 17.7 (H) 11.5 - 14.9 %    Platelets 588 975 - 669 k/uL    MPV 7.8 6.0 - 12.0 fL    NRBC Automated NOT REPORTED per 100 WBC    Differential Type NOT REPORTED     Immature Granulocytes NOT REPORTED 0 %    Absolute Immature Granulocyte NOT REPORTED 0.00 - 0.30 k/uL    WBC Morphology NOT REPORTED     RBC Morphology NOT REPORTED     Platelet Estimate NOT REPORTED     Seg Neutrophils 78 (H) 36 - 66 %    Lymphocytes 11 (L) 24 - 44 %    Monocytes 9 (H) 1 - 7 %    Eosinophils % 0 0 - 4 %    Basophils 2 0 - 2 %    Segs Absolute 4.37 1.3 - 9.1 k/uL    Absolute Lymph # 0.62 (L) 1.0 - 4.8 k/uL    Absolute Mono # 0.50 0.1 - 1.3 k/uL    Absolute Eos # 0.00 0.0 - 0.4 k/uL    Basophils Absolute 0.11 0.0 - 0.2 k/uL    Morphology HYPOCHROMIA PRESENT     Morphology ANISOCYTOSIS PRESENT     Morphology 1+ POLYCHROMASIA    Comprehensive Metabolic Panel w/ Reflex to MG    Collection Time: 04/18/20  1:39 AM   Result Value Ref Range    Glucose 89 70

## 2020-04-18 NOTE — PROGRESS NOTES
1,000 mg Intravenous Q24H    cefepime  1 g Intravenous Q12H    amLODIPine  10 mg Oral Daily    atorvastatin  80 mg Oral Daily    buPROPion  150 mg Oral Daily    carvedilol  25 mg Oral BID WC    clopidogrel  75 mg Oral Daily    pantoprazole  40 mg Oral QAM AC    sertraline  200 mg Oral Daily    budesonide-formoterol  1 puff Inhalation BID    tamsulosin  0.4 mg Oral Daily    traZODone  50 mg Oral Nightly    sodium chloride flush  10 mL Intravenous 2 times per day    heparin (porcine)  5,000 Units Subcutaneous 3 times per day    dextromethorphan  60 mg Oral 2 times per day    vancomycin (VANCOCIN) intermittent dosing (placeholder)   Other RX Placeholder       Social History:     Social History     Socioeconomic History    Marital status:      Spouse name: Not on file    Number of children: Not on file    Years of education: Not on file    Highest education level: Not on file   Occupational History    Not on file   Social Needs    Financial resource strain: Not on file    Food insecurity     Worry: Not on file     Inability: Not on file    Transportation needs     Medical: Not on file     Non-medical: Not on file   Tobacco Use    Smoking status: Former Smoker     Packs/day: 0.25     Years: 55.00     Pack years: 13.75     Types: Cigarettes     Start date: 1963     Last attempt to quit: 2019     Years since quittin.2    Smokeless tobacco: Never Used   Substance and Sexual Activity    Alcohol use: No    Drug use: Not Currently     Types: Cocaine, Marijuana     Comment: cocaine clean date; May, 2001.   Patient smokes Marijuana currently prn for back pain    Sexual activity: Not Currently   Lifestyle    Physical activity     Days per week: Not on file     Minutes per session: Not on file    Stress: Not on file   Relationships    Social connections     Talks on phone: Not on file     Gets together: Not on file     Attends Confucianist service: Not on file     Active member of club or organization: Not on file     Attends meetings of clubs or organizations: Not on file     Relationship status: Not on file    Intimate partner violence     Fear of current or ex partner: Not on file     Emotionally abused: Not on file     Physically abused: Not on file     Forced sexual activity: Not on file   Other Topics Concern    Not on file   Social History Narrative    Not on file       Family History:     Family History   Problem Relation Age of Onset    Heart Disease Mother     High Blood Pressure Mother     Cancer Mother         cervix    Heart Disease Father     High Blood Pressure Father     Stroke Father     Cancer Sister         cervix    Stroke Sister     Cancer Paternal Grandfather         lung    Hypertension Brother     No Known Problems Sister         Allergies:   Patient has no known allergies. Review of Systems:     Review of Systems  As per history present illness other than above 14 systems reviewed were negative  Physical Examination :     Patient Vitals for the past 8 hrs:   Resp SpO2 Weight   04/18/20 1206 28 93 % --   04/18/20 0858 16 93 % --   04/18/20 0600 -- -- 154 lb 8.7 oz (70.1 kg)       Physical Exam  Constitutional:       Appearance: Normal appearance. HENT:      Head: Normocephalic and atraumatic. Eyes:      Conjunctiva/sclera: Conjunctivae normal.   Neck:      Musculoskeletal: No neck rigidity. Cardiovascular:      Rate and Rhythm: Normal rate and regular rhythm. Heart sounds: No murmur. Pulmonary:      Effort: Pulmonary effort is normal. No respiratory distress. Breath sounds: Normal breath sounds. Abdominal:      General: Abdomen is flat. There is no distension. Palpations: Abdomen is soft. Skin:     Findings: No rash. Neurological:      General: No focal deficit present. Mental Status: She is alert and oriented to person, place, and time.    Psychiatric:         Mood and Affect: Mood normal.     Chest port removed site with open wound no significant surrounding erythema or drainage no fluctuation    Medical Decision Making:   I have independently reviewed/ordered the following labs:    CBC with Differential:   Recent Labs     04/17/20  0636 04/18/20  0139   WBC 6.2 5.6   HGB 7.8* 7.1*   HCT 23.7* 21.8*    252   LYMPHOPCT 2* 11*   MONOPCT 6 9*     BMP:  Recent Labs     04/17/20 0636 04/18/20  0139    135   K 4.0 3.7   CL 99 98   CO2 27 27   BUN 12 12   CREATININE 1.42* 1.39*     Hepatic Function Panel:   Recent Labs     04/17/20  0636 04/18/20  0139   PROT 7.3 6.5   LABALBU 2.9* 2.7*   BILITOT 0.36 0.29*   ALKPHOS 78 71   ALT 8 6   AST 14 14       Lab Results   Component Value Date    CREATININE 1.39 04/18/2020    GLUCOSE 89 04/18/2020       Detailed results: Thank you for allowing us to participate in the care of this patient. Please call with questions. This note is created with the assistance of a speech recognition program.  While intending to generate adocument that actually reflects the content of the visit, the document can still have some errors including those of syntax and sound a like substitutions which may escape proof reading. It such instances, actual meaningcan be extrapolated by contextual diversion.     Willian Chan MD  Office: [phone]  Perfect serve / office [phone]

## 2020-04-18 NOTE — PROGRESS NOTES
Pharmacy Vancomycin Consult     Vancomycin Day: 4  Current Dosin g daily    Temp max:  98    Recent Labs     20  0636 20  0139   BUN 12 12       Recent Labs     20  0636 20  0139   CREATININE 1.42* 1.39*       Recent Labs     20  0636 20  0139   WBC 6.2 5.6         Intake/Output Summary (Last 24 hours) at 2020 0215  Last data filed at 2020 0507  Gross per 24 hour   Intake --   Output 400 ml   Net -400 ml       Culture Date      Source                       Results  See micro reports    Ht Readings from Last 1 Encounters:   20 5' 4\" (1.626 m)        Wt Readings from Last 1 Encounters:   04/15/20 146 lb 13.2 oz (66.6 kg)         Body mass index is 25.2 kg/m². Estimated Creatinine Clearance: 37 mL/min (A) (based on SCr of 1.39 mg/dL (H)). Trough: 18.3 mcg/ml    Assessment/Plan:  Trough level is therapeutic. Continue current regimen.

## 2020-04-18 NOTE — CARE COORDINATION
ONGOING DISCHARGE PLAN:    Spoke with patient regarding discharge plan and patient confirms that plan is still to return to home alone w/ VNS, [place]. Pt. Remains on IV Cefepime/Vanco. ID following, awaiting rec for DC. Pt. Already has Oxygen, at home. Denies needs. ?DC today. Will continue to follow for additional discharge needs.     Electronically signed by Yayo Burns RN on 4/18/2020 at 10:27 AM

## 2020-04-20 NOTE — TELEPHONE ENCOUNTER
Name: Sujata Méndez  : 1955  MRN: [id number]    Oncology Navigation Follow-Up Note    Contact Type:  Telephone  Notes: Upon review of Epic noted pt dc'd home with [facility]. Per Dr. Denise Harmon progress note pt to continue IV daptomycin until 2020. Dr. David Genao updated & inquired on f/u. Dr. David Genao stated will speak with Dr. Coby Pringle, [facility], & requested pt scheduled for f/u 2020. Spoke with Hermes Smith, [facility] , updated on pt & requested appt. Pt scheduled 2020 @ 1500. Spoke with pt updated on conversation with Dr. David Genao & 2020 appt details. Inquired on XRT appt today. Pt stated \"I will come tomorrow\". Pt denied questions/concerns/issues. Instructed pt may contact writer prn. Spoke with Gaby Silva., [facility] RO, updated on pt. Will continue to follow.     Electronically signed by Stepan Rebollar RN on 2020 at 9:15 AM

## 2020-04-20 NOTE — DISCHARGE SUMMARY
[place] [address] Internal Medicine    Discharge Summary     Patient ID: Richard Tapia  :  1955   MRN: [id number]     ACCOUNT:  [de-identified]   Patient's PCP: Elisabeth Ravi DO  Admit Date: 2020   Discharge Date: 2020    Length of Stay: 4  Code Status:  Prior  Admitting Physician: Katy Chicas MD  Discharge Physician: Katy Chicas MD     Active Discharge Diagnoses:     Primary Problem  MRSA bacteremia      Hospital Problems  Active Hospital Problems    Diagnosis Date Noted    Stage 3 chronic kidney disease ([facility] [N18.3] 04/15/2020    Pneumonia [J18.9] 2020    MRSA bacteremia [R78.81] 2020    Malignant neoplasm of lung ([facility] [C34.90] 2020    Adenocarcinoma of left lung ([facility] [C34.92] 2020    Depressive disorder [F32.9] 2018    Chronic systolic congestive heart failure ([facility] [I50.22] 2018    Essential hypertension [I10] 2018       Admission Condition:  fair     Discharged Condition: fair    Hospital Stay:     Hospital Course:  Richard Tapia is a 77 y.o. female who was admitted for the management of MRSA bacteremia , presented to ER with No chief complaint on file. Patient has history of malignancy done, she was recently admitted at [facility] with port infection, she was on vancomycin. She admitted to [facility] with generalized malaise, body, bilateral pulmonary infiltrates, initial concern was coronavirus infection which was negative  Patient was evaluated by pulmonologist, ID. Vancomycin was continued.   Patient getting discharged home, she is on oxygen , she has chronic respiratory hypoxic failure        Significant therapeutic interventions:     Significant Diagnostic Studies:   Labs / Micro:        ,     Radiology:    Us Renal Complete    Result Date: 2020  EXAMINATION: RETROPERITONEAL ULTRASOUND OF THE KIDNEYS AND URINARY BLADDER 2020 COMPARISON: None HISTORY: [address] 4/15/2020  EXAMINATION: ONE XRAY VIEW OF THE CHEST 4/14/2020 5:17 pm COMPARISON: 04/03/2020 HISTORY: ORDERING SYSTEM PROVIDED HISTORY: sob TECHNOLOGIST PROVIDED HISTORY: sob Reason for Exam: Pt c/o SOB, anemia, hx of lung cancer Acuity: Unknown Type of Exam: Unknown FINDINGS: The cardiac silhouette is enlarged. Left PICC is in place. There is no pneumothorax. There is patchy airspace opacity bilaterally, most pronounced at the right lower lobe. Mild patchy airspace disease bilaterally. Atypical and viral pneumonia are not excluded. Cardiac silhouette enlargement, as before. Xr Chest Portable    Result Date: 4/3/2020  EXAMINATION: ONE XRAY VIEW OF THE CHEST 4/3/2020 10:21 am COMPARISON: CT chest performed 03/30/2020. HISTORY: ORDERING SYSTEM PROVIDED HISTORY: PICC line placement TECHNOLOGIST PROVIDED HISTORY: PICC line placement Reason for Exam: port upr for picc line FINDINGS: There is chronic pulmonary change. There is bibasilar atelectasis. There is no pneumothorax. The heart is enlarged. The upper abdomen is unremarkable. The extrathoracic soft tissues are unremarkable. There is a left-sided PICC line with the tip near the cavoatrial junction. Cardiomegaly and chronic pulmonary change with bibasilar atelectasis. Left-sided PICC line with the tip near the cavoatrial junction. Xr Chest Portable    Result Date: 3/30/2020  EXAMINATION: ONE XRAY VIEW OF THE CHEST 3/30/2020 5:13 pm COMPARISON: January 20, 2020 HISTORY: ORDERING SYSTEM PROVIDED HISTORY: concern for port infection TECHNOLOGIST PROVIDED HISTORY: concern for port infection Reason for Exam: possible infection of port Acuity: Acute Type of Exam: Initial Additional signs and symptoms: redness and swelling Relevant Medical/Surgical History: hx HTN, COPD, Lung Ca and CAD FINDINGS: Right-sided chemo port. Cardiomegaly. No effusion or pneumothorax. No airspace consolidation. No acute disease.      Ct Chest Pulmonary Embolism W HISTORY: infected port needs to be removed History of lung cancer, lymphoma CONTRAST: None SEDATION: None FLUOROSCOPY DOSE AND TYPE OR TIME AND EXPOSURES: 0.2 minutes; D AP 13 cGy cm2 DESCRIPTION OF PROCEDURE AND FINDINGS: This procedure was performed by Dr. Lucien Guan. Informed consent was obtained after a detailed explanation of the procedure including risks, benefits, and alternatives. Universal protocol was observed. All elements of maximal sterile barrier technique were utilized. The patient is on intravenous antibiotics.  view shows a right jugular chest port with the catheter tip in the superior vena cava. The site is erythematous and the incision is partially open. Small amount of purulent material was expressed from the site. This has been previously cultured. 1% lidocaine was used for local anesthesia. Interrupted sutures from the incision were removed. Using blunt dissection, the port and catheter were able to be excised from the pocket and removed in their entirety. Tip was sent for cultures. Fluoroscopic spot view confirms removal of the entire port and catheter. Gentle manual compression was used to achieve hemostasis. The pocket was irrigated with saline and shown to be clean and dry. Because of the regional erythema and infection, the wound care nurse was consulted for recommendations. The incision was left open and the pocket was packed with iodoform gauze to allow healing by secondary intention. The patient should have arrangements made for follow-up wound care on discharge with possible wound VAC placement. Sterile gauze dressing was applied. There are no immediate complications. The patient left the department in stable condition. EBL:  Less than 5 mL. Right jugular chest port removed successfully. Due to the infection, the wound care team was consulted and the pocket was packed with iodoform gauze.   The patient should have arrangements made for follow-up wound care upon visualized. The uterus is surgically absent. Peritoneum/Retroperitoneum: There is no adenopathy, free air or free fluid. There are small nonspecific left periaortic lymph nodes. There is bulky calcific aorto iliac atherosclerotic disease with bilateral common iliac artery stents. No evidence of an abdominal aortic aneurysm. Bones/Soft Tissues: Degenerative changes in the lumbar spine with a minimal degenerative anterolisthesis of L4 on L5. No osseous metastatic disease. No acute finding in the abdomen or pelvis. No evidence of metastatic disease in the abdomen or pelvis. Cholelithiasis with a 2.2 cm rim calcified gallstone. There are no findings to suggest acute cholecystitis. Clinical correlation and if indicated, follow-up nuclear medicine hepatobiliary imaging study may be helpful in this patient with right abdominal pain and nausea. Xr Chest Portable    Result Date: 4/17/2020  EXAMINATION: ONE XRAY VIEW OF THE CHEST 4/17/2020 12:20 pm COMPARISON: Chest radiograph performed 04/14/2020. HISTORY: ORDERING SYSTEM PROVIDED HISTORY: pneumonia TECHNOLOGIST PROVIDED HISTORY: pneumonia Reason for Exam: pneumonia Acuity: Acute Type of Exam: Initial Additional signs and symptoms: pneumonia FINDINGS: There is mild congestion. There is no avis consolidation or effusion. There is no pneumothorax. Heart is enlarged. The upper abdomen is unremarkable. The extrathoracic soft tissues are unremarkable. Cardiomegaly with mild congestion. Xr Chest Portable    Result Date: 4/15/2020  EXAMINATION: ONE XRAY VIEW OF THE CHEST 4/14/2020 5:17 pm COMPARISON: 04/03/2020 HISTORY: ORDERING SYSTEM PROVIDED HISTORY: sob TECHNOLOGIST PROVIDED HISTORY: sob Reason for Exam: Pt c/o SOB, anemia, hx of lung cancer Acuity: Unknown Type of Exam: Unknown FINDINGS: The cardiac silhouette is enlarged. Left PICC is in place. There is no pneumothorax.   There is patchy airspace opacity bilaterally, most pronounced at the right +------------------------------------+----------+---------------+----------+ ! GSV Knee                            ! Yes       ! Yes            ! None      ! +------------------------------------+----------+---------------+----------+ ! GSV Ankle                           ! Yes       ! Yes            ! None      ! +------------------------------------+----------+---------------+----------+ ! SSV                                 ! Yes       ! Yes            ! None      ! +------------------------------------+----------+---------------+----------+ Left Doppler Measurements +---------------------------+------+------+--------------------------------+ ! Location                   ! Signal!Reflux! Reflux (msec)                   ! +---------------------------+------+------+--------------------------------+ ! Common Femoral             !Phasic!      !                                ! +---------------------------+------+------+--------------------------------+ ! Prox Femoral               !Phasic!      !                                ! +---------------------------+------+------+--------------------------------+ ! Popliteal                  !Phasic!      !                                ! +---------------------------+------+------+--------------------------------+    Ir Remove Tunneled Cvad Wo Sq Port/pump    Result Date: 4/1/2020  PROCEDURE: IR REMOVAL TUNNELED CVC WO PORT PUMP 4/1/2020 HISTORY: ORDERING SYSTEM PROVIDED HISTORY: infected port needs to be removed TECHNOLOGIST PROVIDED HISTORY: infected port needs to be removed History of lung cancer, lymphoma CONTRAST: None SEDATION: None FLUOROSCOPY DOSE AND TYPE OR TIME AND EXPOSURES: 0.2 minutes; D AP 13 cGy cm2 DESCRIPTION OF PROCEDURE AND FINDINGS: This procedure was performed by Dr. Kaur Barr. Informed consent was obtained after a detailed explanation of the procedure including risks, benefits, and alternatives. Universal protocol was observed.   All elements of maximal sterile barrier technique were utilized. The patient is on intravenous antibiotics.  view shows a right jugular chest port with the catheter tip in the superior vena cava. The site is erythematous and the incision is partially open. Small amount of purulent material was expressed from the site. This has been previously cultured. 1% lidocaine was used for local anesthesia. Interrupted sutures from the incision were removed. Using blunt dissection, the port and catheter were able to be excised from the pocket and removed in their entirety. Tip was sent for cultures. Fluoroscopic spot view confirms removal of the entire port and catheter. Gentle manual compression was used to achieve hemostasis. The pocket was irrigated with saline and shown to be clean and dry. Because of the regional erythema and infection, the wound care nurse was consulted for recommendations. The incision was left open and the pocket was packed with iodoform gauze to allow healing by secondary intention. The patient should have arrangements made for follow-up wound care on discharge with possible wound VAC placement. Sterile gauze dressing was applied. There are no immediate complications. The patient left the department in stable condition. EBL:  Less than 5 mL. Right jugular chest port removed successfully. Due to the infection, the wound care team was consulted and the pocket was packed with iodoform gauze. The patient should have arrangements made for follow-up wound care upon discharge. Consultations:    Consults:     Final Specialist Recommendations/Findings:   PHARMACY TO DOSE VANCOMYCIN  IP CONSULT TO INFECTIOUS DISEASES  IP CONSULT TO PULMONOLOGY      The patient was seen and examined on day of discharge and this discharge summary is in conjunction with any daily progress note from day of discharge.     Discharge plan:     Disposition: Home    Physician Follow Up:     [facility] [address] [facility] [address]  [phone]    they will call you to setup a time to come out to see you    [facility]  [address]  Renny Jay [place] [address]          Jessica Parson, [address] Daphne Ramirez  [address]  [address] [facility] [address]  [phone]    In 1 week      [facility]  [address]  [phone]    This is the company providing your Iv antibiotics for home. Requiring Further Evaluation/Follow Up POST HOSPITALIZATION/Incidental Findings:    Diet: cardiac diet    Activity: As tolerated    Instructions to Patient:     Discharge Medications:      Medication List      START taking these medications    daptomycin  infusion  Commonly known as:  CUBICIN  Infuse 500 mg intravenously every 24 hours for 12 days Compound per protocol. CHANGE how you take these medications    Ensure Original Liqd  Take 1 Bottle by mouth 3 times daily as needed (replacement)  What changed:  Another medication with the same name was removed. Continue taking this medication, and follow the directions you see here. sertraline 100 MG tablet  Commonly known as:  ZOLOFT  Take 1.5 tablets by mouth daily  What changed:  how much to take        CONTINUE taking these medications    albuterol sulfate  (90 Base) MCG/ACT inhaler  Inhale 2 puffs into the lungs every 6 hours as needed for Wheezing     amLODIPine 10 MG tablet  Commonly known as:  NORVASC  TAKE ONE TABLET BY MOUTH EVERY DAY     aspirin 81 MG tablet     atorvastatin 80 MG tablet  Commonly known as:  LIPITOR     buPROPion 150 MG extended release tablet  Commonly known as:   Wellbutrin SR  Take 1 tablet by mouth daily     carvedilol 25 MG tablet  Commonly known as:  COREG  TAKE ONE TABLET BY MOUTH TWICE A DAY WITH MEALS     clopidogrel 75 MG tablet  Commonly known as:  PLAVIX     HYDROcodone-acetaminophen 5-325 MG per tablet  Commonly known as:  NORCO  Take 1 tablet by mouth every 6 hours as

## 2020-04-21 NOTE — TELEPHONE ENCOUNTER
[facility] Trans[facility] Initial Follow Up Call    Outreach made within 2 business days of discharge: No    Patient: Aarti Grady Patient : 1955   MRN: [id number]  Reason for Admission: There are no discharge diagnoses documented for the most recent discharge. Discharge Date: 20       Spoke with: Could not LVM due to patients mailbox being full.     Discharge department/facility: [address] [place] [address]

## 2020-04-22 NOTE — PROGRESS NOTES
Alem Richardson  4/22/2020  Wt Readings from Last 3 Encounters:   04/22/20 162 lb (73.5 kg)   04/18/20 154 lb 8.7 oz (70.1 kg)   04/14/20 175 lb (79.4 kg)     Body mass index is 27.81 kg/m². Treatment Area:lung     Patient was seen today for weekly visit. Comfort Alteration  Fatigue: Severe    Ventilation Alterations  Cough: yes   Hemoptysis: No  Mucus Color: white/clear   Dyspnea: Yes  O2 Sat: 93%    Nutritional Alteration  Anorexia: Yes  Nausea: No   Vomiting: No     Mucous Membrane Alteration  Voice Changes/ Stridor/Larynx: no  Pharynx & Esophagus: n/a     Elimination Alterations  Constipation: no  Diarrhea:  no    Skin Alteration   Sensation:intact     Radiation Dermatitis:  Intact [x]     Erythema  []     Discoloration  []     Rash []     Dry desquamation  []     Moist desquamation []       Emotional  Coping: effective      Injury, potential bleeding or infection:pt on antibiotics     Lab Results   Component Value Date    WBC 7.4 04/20/2020     04/20/2020         BP (!) 169/89   Pulse 91   Temp 98.6 °F (37 °C)   Resp 18   Wt 162 lb (73.5 kg)   SpO2 93%   BMI 27.81 kg/m²                   Assessment/Plan: Patient was seen today for weekly visit. She reports a fall prior to coming in for her treatment today. Exam completed and pt noted to have a bruise on her right leg. She declines wanting to have further evaluation/imaging to this area. She reports no LOC and that she did not hit her head. Dr Elsy Grayson notified and examined pt. No new orders received.       Ishmael Herring

## 2020-04-23 PROBLEM — R33.9 URINARY RETENTION: Status: ACTIVE | Noted: 2020-01-01

## 2020-04-23 NOTE — PROGRESS NOTES
Clint Liao MD, [facility] Consultation / New Patient Visit    Patient:  Governor Cloud  YOB: 1955  Date: 4/23/2020  Consult requested from [facility], follow-up from hospital consult for purpose of evaluation of urinary retention. Patient: Lower urinary tract  Severity: Moderate  Context: Sepsis and acute kidney injury, incidental finding of 1 L of urine on ultrasound  Timing: Intermittent, resolved now  Referring factors: Ambulation, improvement in general condition  Associated signs and symptoms: None    Pain Severity: Pain Score:   0 - No pain 0 / 10    Summary of old records:   She was seen on 4/2/2024 bladder volume of 1 L on ultrasound without hydronephrosis and hydroureter. She had port site infection, MRSA blood cultures 2/2` positive and had associated acute kidney injury. Ultrasound ordered per nephrology showed bladder volume of 1 L without hydronephrosis. She had decreased bladder sensation and was started on Flomax. (Patient's old records, notes and chart reviewed and summarized above.)    Urinalysis today:  No results found for this visit on 04/23/20.     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, lung     Chest discomfort     CHF (congestive heart failure) (HCC)     Chronic back pain     \"pinched columns\", smokes marijuana prn for back pain    COPD (chronic obstructive pulmonary disease) (HCC)     Cough     Depression     History of hepatitis age 21    Hx of blood Sexual Activity   Alcohol Use No       REVIEW OF SYSTEMS (obtained by ancillary medical staff, reviewed by physician and agree):  Constitutional: positive for  chills  Eyes: negative  Respiratory: positive for  SOB  Cardiovascular: negative  Gastrointestinal: Positive for nausea  negative  Genitourinary: negative  Musculoskeletal: positive for  Back pain   Skin: negative   Neurological: positive for dizziness  Hematological/Lymphatic: negative  Psychological: negative    Physical Exam:    This a 77 y.o. female      Vitals:    04/23/20 1010   BP: (!) 178/92   Pulse: 90     Physical Examination: This a 77 y.o. female in no acute distress. Patient is alert and oriented. Vitals:    04/23/20 1010   BP: (!) 178/92   Pulse: 90     HEENT negative  Lungs: Respiratory effort is labored/ on oxygen cannula  Cardiovascular: Normal peripheral pulses  Abdomen: Soft, non-tender, non-distended with no CVA, flank pain or hepatosplenomegaly. No hernias. Kidneys normal.  Lymphatics: No palpable lymphadenopathy. Bladder non-tender and not distended. Pelvic exam: Deferred  Rectal exam not indicated    Assessment and Plan      1. Urinary retention           Plan:      Return if symptoms worsen or fail to improve. The patient presents with a history of incomplete bladder emptying. PVR checked today in the clinic was only 27 ml. Timed and double voiding to facilitate bladder emptying. Will stop Flomax  Follow up as needed in clinic          I have discussed the care of this patient including pertinent history and exam findings, with the resident. I have seen and examined the patient and the key elements of all parts of the encounter have been performed by me. I agree with the assessment, plan and orders as documented by the resident. Aniceto Kelly MD, FACS

## 2020-04-24 NOTE — TELEPHONE ENCOUNTER
Nurse from [facility] called and wanted to know if you were going to send the patient to cardio due to her health history?   Patient stated that saw one in the past.

## 2020-04-28 NOTE — ED PROVIDER NOTES
[id number]  [address] [facility] [address]  Phone: [phone]  Fax: [phone]        Pt Name: Roxi Brunson  MRN: [id number]  [place] 1955  Date of evaluation: 4/28/20      CHIEF COMPLAINT     Chief Complaint   Patient presents with    Abnormal Lab     Hgb low here for blood transfusion         HISTORY OF PRESENT ILLNESS  (Location/Symptom, Timing/Onset, Context/Setting, Quality, Duration, Modifying Factors, Severity.)    Roxi Brunson is a 77 y.o. female who presents at the request of her infectious disease physician due to low hemoglobin. Patient has lung cancer, and was undergoing chemotherapy when she developed an infection report. She has a PICC line in place, and has been receiving daptomycin under the care of infectious disease physician, Dr Maryanne Canales. The patient states that she has been short of breath. She is normally on oxygen at home, and has reportedly run out. She does complain of fatigue and lightheadedness as well. Denies GI bleeding. No black or tarry stools. She has not been vomiting blood. No vaginal bleeding.       REVIEW OF SYSTEMS    (2-9 systems for level 4, 10 or more for level 5)     Constitutional: no fever, chills  +fatigue  HENT: No headache, nasal congestion, sore throat, hearing changes, ear pain or discharge  Eyes: no visual changes or photophobia  Respiratory: no cough, +shortness of breath, no wheezing  Cardiovascular: no chest pain, palpitations, or leg swelling  Abdominal: no abdominal pain, nausea, vomiting, diarrhea, or constipation  Genitourinary: no dysuria, frequency, or urgency  Musculoskeletal: no arthralgias, myalgias, neck or back pain  Skin: no rash or wound  Neurological: no numbness, tingling or weakness  Hematologic:  no history of easy bleeding or bruising            PAST MEDICAL HISTORY    has a past medical history of Arthritis, CAD (coronary artery disease), Cancer ([facility] [address] Chest discomfort, CHF (congestive heart failure) ([facility] [address] Chronic back pain, COPD (chronic obstructive pulmonary disease) (HCC), Cough, Depression, History of hepatitis, Hx of blood clots, Hyperlipidemia, Hypertension, Lung cancer ([facility] [address] Mediastinal lymphadenopathy, On home O2, SOB (shortness of breath), Wears dentures, and Wears glasses. SURGICAL HISTORY      has a past surgical history that includes Appendectomy; Tonsillectomy; shoulder surgery (Right); Ankle surgery (Right); fine needle aspiration (Right, 11/2019); other surgical history; Cardiac surgery; bone marrow biopsy (Left, 12/05/2019); Hysterectomy, total abdominal (1982); other surgical history (01/28/2020); bronchoscopy (N/A, 1/28/2020);   picc powerpicc double (4/3/2020); and   picc powerpicc double (4/10/2020). CURRENTMEDICATIONS       Previous Medications    ALBUTEROL SULFATE  (90 BASE) MCG/ACT INHALER    Inhale 2 puffs into the lungs every 6 hours as needed for Wheezing    AMLODIPINE (NORVASC) 10 MG TABLET    TAKE ONE TABLET BY MOUTH EVERY DAY    ASPIRIN 81 MG TABLET    Take 1 tablet by mouth daily    ATORVASTATIN (LIPITOR) 80 MG TABLET    daily     BUPROPION (WELLBUTRIN SR) 150 MG EXTENDED RELEASE TABLET    Take 1 tablet by mouth daily    CARVEDILOL (COREG) 25 MG TABLET    TAKE ONE TABLET BY MOUTH TWICE A DAY WITH MEALS    CLOPIDOGREL (PLAVIX) 75 MG TABLET    Take 1 tablet by mouth daily    DAPTOMYCIN (CUBICIN) INFUSION    Infuse 500 mg intravenously every 24 hours for 12 days Compound per protocol. LIDOCAINE VISCOUS HCL (XYLOCAINE) 2 % SOLN SOLUTION    SWISH AND SPIT 10 MLS FOUR TIMES DAILY AS NEEDED FOR IRRITATION    LIDOCAINE-PRILOCAINE (EMLA) 2.5-2.5 % CREAM    Apply topically as needed.     LOSARTAN-HYDROCHLOROTHIAZIDE (HYZAAR) 100-12.5 MG PER TABLET    Take 1 tablet by mouth daily    NUTRITIONAL SUPPLEMENTS (ENSURE ORIGINAL) LIQD    Take 1 Bottle by mouth 3 times daily as needed (replacement)    OMEPRAZOLE (PRILOSEC) 20 MG DELAYED RELEASE CAPSULE    Take 1 capsule by mouth once daily    ONDANSETRON (ZOFRAN-ODT) 8 MG TBDP DISINTEGRATING TABLET    Place 1 tablet under the tongue 3 times daily as needed for Nausea or Vomiting    OXYGEN    Inhale 2 L into the lungs daily as needed    SERTRALINE (ZOLOFT) 100 MG TABLET    Take 1.5 tablets by mouth daily    SYMBICORT 160-4.5 MCG/ACT AERO    INHALE 1 PUFF BY MOUTH AS DIRECTED TWO TIMES A DAY    TAMSULOSIN (FLOMAX) 0.4 MG CAPSULE    Take 1 capsule by mouth daily    TRAZODONE (DESYREL) 50 MG TABLET    TAKE ONE TABLET BY MOUTH DAILY AT BEDTIME       ALLERGIES     has No Known Allergies. FAMILY HISTORY     She indicated that her mother is . She indicated that her father is . She indicated that both of her sisters are alive. She indicated that her brother is alive. She reported the following about her paternal grandfather: lung .     family history includes Cancer in her mother, paternal grandfather, and sister; Heart Disease in her father and mother; High Blood Pressure in her father and mother; Hypertension in her brother; No Known Problems in her sister; Stroke in her father and sister. SOCIAL HISTORY      reports that she quit smoking about 15 months ago. Her smoking use included cigarettes. She started smoking about 56 years ago. She has a 13.75 pack-year smoking history. She has never used smokeless tobacco. She reports previous drug use. Drugs: Cocaine and Marijuana. She reports that she does not drink alcohol. PHYSICAL EXAM    (up to 7 for level 4, 8 or more for level 5)   INITIAL VITALS:  height is 5' 4\" (1.626 m) and weight is 68 kg (150 lb). Her oral temperature is 99 °F (37.2 °C). Her blood pressure is 141/91 (abnormal) and her pulse is 87. Her respiration is 20 and oxygen saturation is 90%. Physical Exam    DIFFERENTIAL DIAGNOSIS/ MDM:     67-year-old female referred here due to a low hemoglobin. Repeat hemoglobin is 8, which is an improvement from her last hemoglobin.   Patient states that she is breathing much more easily now that she has her oxygen back on. Patient was out at appointments, and ran out of her canister oxygen. She does have oxygen at home. Patient would prefer to be discharged home. She will follow-up with her primary care provider.     DIAGNOSTIC RESULTS     EKG: All EKG's are interpreted by the Emergency Department Physician who either signs or Co-signs this chart in the [address] a cardiologist.    none    RADIOLOGY:  Non-plain film images such as CT, Ultrasound and MRI are read by the radiologist. Juma Dural radiographic images are visualized and the radiologist interpretations are reviewed as follows:     none    Interpretation per the Radiologist below, if available at the time of this note:    none    LABS:  Results for orders placed or performed during the hospital encounter of 04/28/20   CBC Auto Differential   Result Value Ref Range    WBC 6.2 3.5 - 11.0 k/uL    RBC 2.87 (L) 4.0 - 5.2 m/uL    Hemoglobin 8.0 (L) 12.0 - 16.0 g/dL    Hematocrit 24.4 (L) 36 - 46 %    MCV 85.1 80 - 100 fL    MCH 27.9 26 - 34 pg    MCHC 32.8 31 - 37 g/dL    RDW 18.5 (H) 12.5 - 15.4 %    Platelets 918 810 - 587 k/uL    MPV 7.4 6.0 - 12.0 fL    NRBC Automated NOT REPORTED per 100 WBC    Differential Type NOT REPORTED     Seg Neutrophils 77 (H) 36 - 66 %    Lymphocytes 10 (L) 24 - 44 %    Monocytes 10 2 - 11 %    Eosinophils % 2 1 - 4 %    Basophils 1 0 - 2 %    Immature Granulocytes NOT REPORTED 0 %    Segs Absolute 4.80 1.8 - 7.7 k/uL    Absolute Lymph # 0.60 (L) 1.0 - 4.8 k/uL    Absolute Mono # 0.60 0.1 - 1.2 k/uL    Absolute Eos # 0.10 0.0 - 0.4 k/uL    Basophils Absolute 0.10 0.0 - 0.2 k/uL    Absolute Immature Granulocyte NOT REPORTED 0.00 - 0.30 k/uL    WBC Morphology NOT REPORTED     RBC Morphology NOT REPORTED     Platelet Estimate NOT REPORTED        Hemoglobin is 8    EMERGENCY DEPARTMENT COURSE:   Vitals:    Vitals:    04/28/20 1436 04/28/20 1455   BP: (!) 140/81 (!) 141/91   Pulse: 92 87

## 2020-04-28 NOTE — TELEPHONE ENCOUNTER
Blanca Hassan MD 4/28/2020 2:02 PM [facility] home care RN was able to contact Aicha Masters- she will go to [facility] today. Aden Nichole         Due to low hemoglobin- attempted to contact patient. Dr Brianda Parsons wanted updated if pt was reached. Sent perfect serve message to inform.

## 2020-04-28 NOTE — PROGRESS NOTES
Infectious disease Consult Note      Patient: Becky Fajardo  : 1955  Acct#:  [de-identified]     Date:  2020    Subjective:       History of Present Illness  Patient is a 77 y.o.  female    Chief Complaint   Patient presents with    Frequent Infections     Port Infection. 20-Please call Christina louis/plan of care RE: Dapto vs Atorvastatin and Dapto dosing. #[phone]. Saurabh Herring*   The patient was hospitalized at [facility] on 2020 for increased shortness of breath with possible underlying pneumonia was treated with IV vancomycin and cefepime during her hospitalization there was back to IV daptomycin on discharge to be finished on 2020   COVID-19 was negative twice. Prior to that she was hospitalized for MRSA bacteremia thought to be related to infected port that was removed, PICC line was placed and was discharged on IV vancomycin on 2020, apparently her renal function got worse and vancomycin was changed to daptomycin. Interval history 2020  The patient is feeling well today, had a chronic shortness of breath not worse on oxygen per nasal cannula, denied any fever or chills, denied nausea or vomiting, no diarrhea, no cough,no bleed, no other complaints. Port removal site wound is healing with no purulent drainage.   Left arm PICC line in place  2020 creatinine 1.2, liver enzymes normal, CK 17, WBC 7.5, hemoglobin 5.4  Past Medical History:   Diagnosis Date    Arthritis     CAD (coronary artery disease)     fatty pockets in the atrium, states \"30% beating\"    Cancer ([facility] [address]     follicular lymphoma- , lung     Chest discomfort     CHF (congestive heart failure) (HCC)     Chronic back pain     \"pinched columns\", smokes marijuana prn for back pain    COPD (chronic obstructive pulmonary disease) (HCC)     Cough     Depression     History of hepatitis age 21    Hx of blood clots     Hyperlipidemia     Hypertension     Dr. Wilfredo Pereira ([address]     Mediastinal lymphadenopathy     On home O2     all the time    SOB (shortness of breath)     Wears dentures     upper,does not wear lower    Wears glasses       Past Surgical History:   Procedure Laterality Date    ANKLE SURGERY Right     broken ankle with hardware inserted and later removed    APPENDECTOMY      BONE MARROW BIOPSY Left 12/05/2019    pelvic, benign    BRONCHOSCOPY N/A 1/28/2020    EBUS WITH STATION 4L LYMPH NODE FNA performed by Omar Figueroa MD at [address]      cardiac cath, no stents    FINE NEEDLE ASPIRATION Right 11/2019    lymph nodes, cancer found, follicular    HC  PICC [facility] DOUBLE  4/3/2020         HC  PICC [facility] DOUBLE  4/10/2020         HYSTERECTOMY, TOTAL ABDOMINAL  1982    with BSO    OTHER SURGICAL HISTORY      blood clot extraction from under bladder, angiogram to remove bloot clot and inserted \"something to keep the vein open\" ?stent?  OTHER SURGICAL HISTORY  01/28/2020    ENDOBRONCHIAL ULTRASOUND WITH STATION 4L LYMPH NODE FNA    SHOULDER SURGERY Right     rotator cuff    TONSILLECTOMY            Admission Meds  Current Outpatient Medications on File Prior to Visit   Medication Sig Dispense Refill    clopidogrel (PLAVIX) 75 MG tablet Take 1 tablet by mouth daily 90 tablet 1    aspirin 81 MG tablet Take 1 tablet by mouth daily 90 tablet 1    carvedilol (COREG) 25 MG tablet TAKE ONE TABLET BY MOUTH TWICE A DAY WITH MEALS 60 tablet 3    amLODIPine (NORVASC) 10 MG tablet TAKE ONE TABLET BY MOUTH EVERY DAY 90 tablet 1    losartan-hydrochlorothiazide (HYZAAR) 100-12.5 MG per tablet Take 1 tablet by mouth daily 30 tablet 5    daptomycin (CUBICIN) infusion Infuse 500 mg intravenously every 24 hours for 12 days Compound per protocol.  6000 mg 0    lidocaine viscous hcl (XYLOCAINE) 2 % SOLN solution SWISH AND SPIT 10 MLS FOUR TIMES DAILY AS NEEDED FOR IRRITATION 120 mL 0    tamsulosin (FLOMAX) 0.4 MG capsule Take 1 capsule by Grandfather         lung    Hypertension Brother     No Known Problems Sister           Review of Systems  No fever / chills / sweats. No weight loss. No visual change, eye pain, eye discharge. No oral lesion, sore throat, dysphagia. Denies n / v / abd pain. No diarrhea. Denies dysuria or change in urinary function. Denies joint swelling or pain. Denies focal weakness,   Other than above 14systems reviewed were negative . Physical Exam  /89 (Site: Right Lower Arm, Position: Sitting, Cuff Size: Medium Adult)   Pulse 77   Ht 5' 4\" (1.626 m)   Wt 154 lb (69.9 kg)   SpO2 97% Comment: on 3 LPM  BMI 26.43 kg/m²           General Appearance: alert and oriented to person, place and time, well-developed and well-nourished, in no acute distress  Skin: warm and dry, no rash or erythema  Head: normocephalic and atraumatic  Eyes: pupils equal, round  ENT: hearing grossly normal bilaterally. Neck: neck supple and non tender . Pulmonary/Chest: clear to auscultation bilaterally- no wheezes, rales or rhonchi, normal air movement, no respiratory distress  Cardiovascular: normal rate, regular rhythm, normal S1 and S2, no murmurs.   Abdomen: soft, non-tender, non-distended  Extremities: no cyanosis, clubbing or edema  Musculoskeletal: normal range of motion, no joint swelling, deformity or tenderness  Neurologic: no cranial nerve deficit and muscle strength normal    Data Review:    WBC   Date Value Ref Range Status   04/27/2020 7.5 3.5 - 11.3 k/uL Final   04/20/2020 7.4 3.5 - 11.3 k/uL Final   04/18/2020 5.6 3.5 - 11.0 k/uL Final     Hemoglobin   Date Value Ref Range Status   04/27/2020 5.4 (LL) 11.9 - 15.1 g/dL Final     Comment:     TEST  CONFIRMED     04/20/2020 7.2 (L) 11.9 - 15.1 g/dL Final   04/18/2020 7.1 (L) 12.0 - 16.0 g/dL Final     Hematocrit   Date Value Ref Range Status   04/27/2020 17.1 (L) 36.3 - 47.1 % Final   04/20/2020 23.7 (L) 36.3 - 47.1 % Final   04/18/2020 21.8 (L) 36 - 46 % 04/18/2020 0.29 (L) 0.3 - 1.2 mg/dL Final     Alkaline Phosphatase   Date Value Ref Range Status   04/27/2020 77 35 - 104 U/L Final   04/20/2020 68 35 - 104 U/L Final   04/18/2020 71 35 - 104 U/L Final     Lipase   Date Value Ref Range Status   03/31/2020 11 (L) 13 - 60 U/L Final   03/30/2020 12 (L) 13 - 60 U/L Final     Amylase   Date Value Ref Range Status   03/31/2020 18 (L) 28 - 100 U/L Final     Protime   Date Value Ref Range Status   04/16/2020 13.7 11.8 - 14.6 sec Final   04/14/2020 10.3 9.4 - 12.6 sec Final   04/02/2020 9.6 9.0 - 12.0 sec Final     INR   Date Value Ref Range Status   04/16/2020 1.1  Final     Comment:           Non-therapeutic Range:     INR = 0.9-1.2  Therapeutic Range: Moderate Anticoagulant Intensity:     INR = 2.0-3.0   High Anticoagulant Intensity:     INR = 2.5-3.5           04/14/2020 1.0  Final     Comment:           Therapeutic Range: Moderate Anticoagulant Intensity:     INR = 2.0-3.0   High Anticoagulant Intensity:     INR = 2.5-3.5           04/02/2020 0.9  Final     Comment:           Therapeutic Range: Moderate Anticoagulant Intensity:     INR = 2.0-3.0   High Anticoagulant Intensity:     INR = 2.5-3.5             No results found for: PTT  No results found for: OCCULTBLD  No results found for: GLUMET     Imaging Studies:                           All appropriate imaging studies and reports reviewed: Yes  Us Renal Complete    Result Date: 4/1/2020  EXAMINATION: RETROPERITONEAL ULTRASOUND OF THE KIDNEYS AND URINARY BLADDER 4/1/2020 COMPARISON: None HISTORY: ORDERING SYSTEM PROVIDED HISTORY: increased creatinine TECHNOLOGIST PROVIDED HISTORY: increased creatinine DANITA FINDINGS: Kidneys: The right kidney measures 12.0 cm in length and the left kidney measures 11.5 cm in length. Kidneys demonstrate normal cortical echogenicity. No evidence of hydronephrosis or intrarenal stones. Anechoic structure lower pole right kidney compatible with cyst requiring no imaging follow-up. Upper pole left renal lesion compatible cyst requiring no imaging follow-up. Bladder: Prevoid bladder volume of 1.0 L. discoid for bladder outlet obstruction. Incidental note of hepatic cysts requiring no imaging follow-up. Distended urinary bladder. Please correlate for bladder outlet obstruction. Ct Abdomen Pelvis W Iv Contrast Additional Contrast? None    Result Date: 3/30/2020  EXAMINATION: CT OF THE ABDOMEN AND PELVIS WITH CONTRAST 3/30/2020 5:01 pm TECHNIQUE: CT of the abdomen and pelvis was performed with the administration of intravenous contrast. Multiplanar reformatted images are provided for review. Dose modulation, iterative reconstruction, and/or weight based adjustment of the mA/kV was utilized to reduce the radiation dose to as low as reasonably achievable. COMPARISON: Concurrent CT chest and PET/CT 11/29/2019 HISTORY: ORDERING SYSTEM PROVIDED HISTORY: right side abominal pain with nausea; currently tx for lung cancer TECHNOLOGIST PROVIDED HISTORY: right side abominal pain with nausea; currently tx for lung cancer Reason for Exam: possible infection of port Acuity: Acute Type of Exam: Initial Additional signs and symptoms: redness, abd pain Relevant Medical/Surgical History: hx CAD, CHF and Lung Cancer FINDINGS: Lower Chest: There is lipomatous hypertrophy of the interatrial septum. There is mild bibasilar dependent atelectasis, right greater than left. Organs: Subcentimeter low-attenuation lesion in the anterior dome of the liver is unchanged and most consistent with small cyst.  Liver is otherwise unremarkable. 2.2 cm rim calcified gallstone. No pericholecystic fluid or fat stranding. The spleen, pancreas, adrenal glands and kidneys are normal. There are few unchanged bilateral renal cysts measuring up to 2.9 cm in the left kidney. GI/Bowel: Mild sigmoid colon diverticulosis. No evidence of diverticulitis. No evidence of appendicitis. No bowel obstruction.  Pelvis: Urinary bladder is most pronounced at the right lower lobe. Mild patchy airspace disease bilaterally. Atypical and viral pneumonia are not excluded. Cardiac silhouette enlargement, as before. Xr Chest Portable    Result Date: 4/3/2020  EXAMINATION: ONE XRAY VIEW OF THE CHEST 4/3/2020 10:21 am COMPARISON: CT chest performed 03/30/2020. HISTORY: ORDERING SYSTEM PROVIDED HISTORY: PICC line placement TECHNOLOGIST PROVIDED HISTORY: PICC line placement Reason for Exam: port upr for picc line FINDINGS: There is chronic pulmonary change. There is bibasilar atelectasis. There is no pneumothorax. The heart is enlarged. The upper abdomen is unremarkable. The extrathoracic soft tissues are unremarkable. There is a left-sided PICC line with the tip near the cavoatrial junction. Cardiomegaly and chronic pulmonary change with bibasilar atelectasis. Left-sided PICC line with the tip near the cavoatrial junction. Xr Chest Portable    Result Date: 3/30/2020  EXAMINATION: ONE XRAY VIEW OF THE CHEST 3/30/2020 5:13 pm COMPARISON: January 20, 2020 HISTORY: ORDERING SYSTEM PROVIDED HISTORY: concern for port infection TECHNOLOGIST PROVIDED HISTORY: concern for port infection Reason for Exam: possible infection of port Acuity: Acute Type of Exam: Initial Additional signs and symptoms: redness and swelling Relevant Medical/Surgical History: hx HTN, COPD, Lung Ca and CAD FINDINGS: Right-sided chemo port. Cardiomegaly. No effusion or pneumothorax. No airspace consolidation. No acute disease. Ct Chest Pulmonary Embolism W Contrast    Result Date: 3/30/2020  EXAMINATION: CTA OF THE CHEST 3/30/2020 5:01 pm TECHNIQUE: CTA of the chest was performed after the administration of intravenous contrast.  Multiplanar reformatted images are provided for review. MIP images are provided for review.  Dose modulation, iterative reconstruction, and/or weight based adjustment of the mA/kV was utilized to reduce the radiation dose to as low as cm Left Lower Extremities DVT Study Measurements Left 2D Measurements +------------------------------------+----------+---------------+----------+ ! Location                            ! Visualized! Compressibility! Thrombosis! +------------------------------------+----------+---------------+----------+ ! Common Femoral                      !Yes       ! Yes            ! None      ! +------------------------------------+----------+---------------+----------+ ! Prox Femoral                        !Yes       ! Yes            ! None      ! +------------------------------------+----------+---------------+----------+ ! Mid Femoral                         !Yes       ! Yes            ! None      ! +------------------------------------+----------+---------------+----------+ ! Dist Femoral                        !Yes       ! Yes            ! None      ! +------------------------------------+----------+---------------+----------+ ! Deep Femoral                        !No        !               !          ! +------------------------------------+----------+---------------+----------+ ! Popliteal                           !Yes       ! Yes            ! None      ! +------------------------------------+----------+---------------+----------+ ! Sapheno Femoral Junction            ! Yes       ! Yes            ! None      ! +------------------------------------+----------+---------------+----------+ ! PTV                                 ! Yes       ! Yes            ! None      ! +------------------------------------+----------+---------------+----------+ ! Peroneal                            !Yes       ! Yes            ! None      ! +------------------------------------+----------+---------------+----------+ ! Gastroc                             ! Yes       ! Yes            ! None      ! +------------------------------------+----------+---------------+----------+ ! GSDEREK Beckett                           ! Yes       ! Yes            ! None      ! home oxygen  Congestive heart failure  Hypertension        Recommendations:   Continue IV daptomycin change the dose to once a day due to improve her renal function, stop date on 4/30/2020  Remove PICC line on 4/30/2020  Case was discussed with [place] pharmacist.  The pt needs to go to the ER for evaluation of anemia, hemoglobin was 5.4 yesterday      Thank you for allowing me to participate in the care of your patient. Please feel free to contact me with any questions or concerns.      Jaylen Thayer MD

## 2020-04-29 NOTE — PROGRESS NOTES
size  Lymphatics - no palpable lymphadenopathy, no hepatosplenomegaly  Chest -decreased breathing sounds bilaterally - stable over previous  Heart - normal rate, regular rhythm, normal S1, S2, no murmurs  Abdomen - soft, nontender, nondistended, no masses or organomegaly  Neurological - alert, oriented, normal speech, no focal findings or movement disorder noted  Extremities - peripheral pulses normal, no pedal edema, no clubbing or cyanosis  Skin - normal coloration and turgor, no rashes, no suspicious skin lesions noted   Mass behind right ear no longer palpable, some point tenderness      DATA:  Lab Results   Component Value Date    WBC 6.4 04/29/2020    HGB 8.3 (L) 04/29/2020    HCT 24.9 (L) 04/29/2020    MCV 84.8 04/29/2020     04/29/2020   '    Chemistry        Component Value Date/Time     04/28/2020 1534    K 2.8 (LL) 04/28/2020 1534    CL 93 (L) 04/28/2020 1534    CO2 31 04/28/2020 1534    BUN 16 04/28/2020 1534    CREATININE 1.42 (H) 04/28/2020 1534        Component Value Date/Time    CALCIUM 9.0 04/28/2020 1534    ALKPHOS 77 04/27/2020 1115    AST 16 04/27/2020 1115    ALT 6 04/27/2020 1115    BILITOT 0.30 04/27/2020 1115          Results for orders placed or performed during the hospital encounter of 12/17/19   APTT   Result Value Ref Range    PTT 28.9 24.0 - 36.0 sec   Platelet count   Result Value Ref Range    Platelets 916 686 - 799 k/uL   Protime-INR   Result Value Ref Range    Protime 13.1 11.8 - 14.6 sec    INR 1.0                         Results for orders placed or performed during the hospital encounter of 04/29/20   CBC Auto Differential   Result Value Ref Range    WBC 6.4 3.5 - 11.0 k/uL    RBC 2.94 (L) 4.0 - 5.2 m/uL    Hemoglobin 8.3 (L) 12.0 - 16.0 g/dL    Hematocrit 24.9 (L) 36 - 46 %    MCV 84.8 80 - 100 fL    MCH 28.1 26 - 34 pg    MCHC 33.1 31 - 37 g/dL    RDW 18.0 (H) 12.5 - 15.4 %    Platelets 446 109 - 384 k/uL    MPV 7.4 6.0 - 12.0 fL    NRBC Automated NOT REPORTED per 100 WBC    Differential Type NOT REPORTED     Seg Neutrophils 78 (H) 36 - 66 %    Lymphocytes 10 (L) 24 - 44 %    Monocytes 9 2 - 11 %    Eosinophils % 2 1 - 4 %    Basophils 1 0 - 2 %    Immature Granulocytes NOT REPORTED 0 %    Segs Absolute 5.00 1.8 - 7.7 k/uL    Absolute Lymph # 0.60 (L) 1.0 - 4.8 k/uL    Absolute Mono # 0.60 0.1 - 1.2 k/uL    Absolute Eos # 0.10 0.0 - 0.4 k/uL    Basophils Absolute 0.10 0.0 - 0.2 k/uL    Absolute Immature Granulocyte NOT REPORTED 0.00 - 0.30 k/uL    WBC Morphology NOT REPORTED     RBC Morphology NOT REPORTED     Platelet Estimate NOT REPORTED      Xr Chest Portable    Result Date: 4/17/2020  EXAMINATION: ONE XRAY VIEW OF THE CHEST 4/17/2020 12:20 pm COMPARISON: Chest radiograph performed 04/14/2020. HISTORY: ORDERING SYSTEM PROVIDED HISTORY: pneumonia TECHNOLOGIST PROVIDED HISTORY: pneumonia Reason for Exam: pneumonia Acuity: Acute Type of Exam: Initial Additional signs and symptoms: pneumonia FINDINGS: There is mild congestion. There is no avis consolidation or effusion. There is no pneumothorax. Heart is enlarged. The upper abdomen is unremarkable. The extrathoracic soft tissues are unremarkable. Cardiomegaly with mild congestion. Xr Chest Portable    Result Date: 4/15/2020  EXAMINATION: ONE XRAY VIEW OF THE CHEST 4/14/2020 5:17 pm COMPARISON: 04/03/2020 HISTORY: ORDERING SYSTEM PROVIDED HISTORY: sob TECHNOLOGIST PROVIDED HISTORY: sob Reason for Exam: Pt c/o SOB, anemia, hx of lung cancer Acuity: Unknown Type of Exam: Unknown FINDINGS: The cardiac silhouette is enlarged. Left PICC is in place. There is no pneumothorax. There is patchy airspace opacity bilaterally, most pronounced at the right lower lobe. Mild patchy airspace disease bilaterally. Atypical and viral pneumonia are not excluded. Cardiac silhouette enlargement, as before.        Impression:  Low-grade follicular lymphoma, NJ30 positive, early stageI/II  Poorly differentiated adenocarcinoma of left lung, clinical stage T2b N2 M0, stage IIIa  Port infection with MRSA bacteremia  Radiation esophagitis  Mediastinal lymphadenopathy, PET positive, negative on biopsy  Peripheral vascular disease  History of cocaine abuse  COPD  Lipomatous hypertrophy of inter-atrial septum  History of tobacco dependence, quit in January 2019    Plan:  Her lab work was reviewed. I discussed patient's case with her oncologist in [facility] regarding consolidation therapy with immunotherapy. [facility] recommends that patient proceed with consolidation immune therapy and to continue treating patient has a stage III unresectable lung cancer. I discussed these recommendations with the patient. I also recommended patient to set up a virtual visit with her oncologist at [facility] if she had any questions. Patient wishes to proceed with consolidation immunotherapy using [employer]. I will place orders for preauthorization. Reviewed logistics potential side effects. We will also obtain scan to assess disease status. Return in 4 weeks to review imaging and commence with immune therapy. NCCN guidelines were reviewed and discussed with the patient. The diagnosis and care plan were discussed with the patient in detail. I discussed the natural history of the disease, prognosis, risks and goals of therapy and answered all the patients questions to the best of my ability. Patient expressed understanding and was in agreement. Vaughn Elliott        I spent more than 25 minutes examining, evaluating, reviewing data, counseling the patient and coordinating care.   Greater than 50% of time was spent face-to-face with the patient this note is created with the assistance of a speech recognition program.  While intending to generate a document that actually reflects the content of the visit, the document can still have some errors including those of syntax and sound a like substitutions which may escape

## 2020-05-01 NOTE — PROGRESS NOTES
[facility]            Radiation Oncology          [address]          [address]        Brianne Villa: [phone]        F: [phone]       [facility]         Dear Dr Mani Damon: Thank you for referring Rylee Martin to me for evaluation and treatment. Below is a summary of the patient's recently completed radiation course. If you have questions, please do not hesitate to call me. I look forward to following this patient along with you. Sincerely,  Electronically signed by Leandro Rudd MD on 20 at 10:40 AM EDT      CC: Patient Care Team:  Galina Martell DO as PCP - General (Family Medicine)  Galina Martell DO as PCP - [facility]  Bethany Anderson RN as Nurse Navigator  David Rodríguez MD as Consulting Physician (Infectious Diseases)  ------------------------------------------------------------------------------------------------------------------------------------------------------------------------------------------        Date of Service: 2020     Location:  [facility] Radiation Oncology,   [address] [place]   [phone]        RADIATION ONCOLOGY END OF TREATMENT SUMMARY:    Patient ID:   Rylee Martin  : 1955   MRN: [id number]    DIAGNOSIS:  Stage IIIA [id number] non-small adenocarcinoma of the left upper lobe lung    TREATMENT DETAILS:    Treatment modality: IMRT      Treatment Summary:  Radiation Oncology - Course: 1 Protocol:    Treatment Site Current Dose Modality From To Elapsed Days Fx. CATARINA med LN  6,000 cGy x06  2020  60 30              - patient had two 1 week long breaks due to hospital admissions. CLINICAL COURSE:    Patient completed the treatment as prescribed and tolerated treatment as expected. Patient developed a port infection and required hospital admission. She was placed on antibiotics, and her concurrent chemotherapy was held.  She did complete her

## 2020-05-01 NOTE — FLOWSHEET NOTE
Situation:  Writer received referral from Samantha Costello, Nurse Navigator, to provide pastoral support to Patient. Assessment:  Ms. Lex Cohen answered her phone. She told writer, \"I feel like a new person. \" She shared that she was able to get up and take a shower today. She expressed hopes of being able to feel a little better every day. Ms. Lex Cohen acknowledged that she is not giving up and cannot understand why others do. She is staying positive. Her support system include three neighbors who check on her and run errands for her, her cat, and her daughter and friend. Ms. Lex Cohen sounded upbeat and appeared to be coping well. Intervention:  Writer provided supportive presence and explored Pt's coping and needs; inquired about Pt's sources of support and strength; offered words of encouragement and support; affirmed Pt's strengths. Outcome:  Ms. Pham Mate for the call and told writer to [employer] well. \"        05/01/20 1332   Encounter Summary   Services provided to: Patient   Referral/Consult From: Nurse   Support System Friends/neighbors; Children   Continue Visiting   (5/1/20)   Complexity of Encounter Low   Length of Encounter 15 minutes   Spiritual Assessment Completed Yes   Spiritual/Denominational   Type Spiritual support   Assessment Calm; Hopeful;Coping   Intervention Active listening;Explored feelings, thoughts, concerns;Explored coping resources;Sustaining presence/ Ministry of presence; Discussed meaning/purpose;Discussed illness/injury and it's impact; Discussed belief system/Rastafarian practices/curt   Outcome Encouraged; Hopeful;Receptive;Coping;Engaged in conversation;Expressed gratitude     Electronically signed by Council Goltz, [address] [facility], [address] [facility] Radiation Oncology  5/1/2020  1:37 PM

## 2020-05-12 NOTE — TELEPHONE ENCOUNTER
[facility], regarding CXR results ordered by PCP. Writer informed [facility] that PCP will have to address CXR results as they are the ordering physician. Wellington Claire stated that she is not getting any help from PCP and stated we are \"giving her the runaround. \" Justin Silva stated that Dr David Genao can review CXR results, but cannot address anything since PCP ordered this. Alaina hung up on writer. Writer further reviewed chart and Dr Kamran Noland addressed CXR results last night and ordered antibiotic due to pneumonia, instructed patient to notify urologist due to upcoming procedure, and instructed patient to take a daily probiotic OTC due to frequent use of antibiotics. Writer called [employer] and spoke with [address] informed of above and stated [facility] will [facility].  Zamzam Carrasquillo

## 2020-05-15 NOTE — TELEPHONE ENCOUNTER
Patients home health nurse called today stating they sent her to [facility] today for SOB, weakness, slowed speech, sats in the 80's and BP was 80/50.

## 2020-05-21 NOTE — TELEPHONE ENCOUNTER
Name: James Sage  : 1955  MRN: [id number]    Oncology Navigation Follow-Up Note    Contact Type:  Telephone  Notes: Attempted to contact pt, no answer, VM left notified need to RS missed CT imaging & requested contact writer. Spoke with pt's daughter, Tulio Vazquez, notified pt missed CT imaging appt & unable to contact. Tyshawn stated pt currently @ [facility] on respirator. Migelvelasquezmelissa Damien will contact [facility] nurse, update Dr. Gregory Abrams, & instructed Tyshawn will follow closely. Tyshawn thanked writer. Spoke with Dolores Krugerdkleivfarjelena [facility] nurse, notified writer navigating oncology care, updated on conversation with Tyshawn, & inquired on pt status. Paxton Estrada stated pt unable to wean today r/t increase RR during weaning trial.  Paxton Estrada stated pt unable to follow commands today & sedation changed. Updated Paxton Estrada on pt's oncology tx & recent hospital admission for infected port. Notified Paxton Estrada pt seeing Dr. Gregory Abrams, Dr. Rachelle Tay. Ely Vargas may contact writer @ [phone] prn. Dr. Gregory Abrams updated on pt. Will continue to follow.     Electronically signed by Magan Gaviria RN on 2020 at 2:03 PM

## 2020-05-21 NOTE — TELEPHONE ENCOUNTER
ASSISTING Jose L Weston, RN NAVIGATOR. PATIENT MISSED HER APPT FOR CT SCAN ON 5/20/20. PER JUDI'S REQUEST I CALLED PATIENT TO OFFER ASSISTANCE TO RESCHEDULE THAT WAY WE CAN HAVE THE RESULTS PRIOR TO PATIENT'S APPT ON 5/27/20 WITH DR. ARELLANO. PATIENT DID NOT ANSWER AND I LEFT A DETAILED MESSAGE AS TO WHY I WAS CALLING. I ASKED HER TO CALL ME IF SHE HAS ANY QUESTIONS [phone]. I ALSO LEFT HER SCHEDULING'S NUMBER SO THAT SHE CAN CALL THEM DIRECTLY TO SCHEDULE.

## 2020-05-26 NOTE — TELEPHONE ENCOUNTER
Name: Camden Neves  : 1955  MRN: [id number]    Oncology Navigation Follow-Up Note    Contact Type:  Telephone  Notes: Spoke with pt's daughter, Michael Patel, for f/u call to inquire on pt status. Tyshawn stated [facility]. Tyshawn started to cry, support given. Instructed Tyshawn writer will follow closely & may contact writer prn. Will continue to follow.     Electronically signed by Vero Flores RN on 2020 at 11:29 AM

## 2020-05-27 NOTE — TELEPHONE ENCOUNTER
Per Epic notes, pt currently admitted @[facility] ICU. Pt RO f/u 6/2/20 canceled, will place pt on pending list for f/u after discharge.

## 2020-06-01 NOTE — TELEPHONE ENCOUNTER
Nolan Friedman records scanned into media Rad Onc consult, MRI's, CT Chest also requested images to be sent to [facility].

## 2020-06-01 NOTE — TELEPHONE ENCOUNTER
Received referral for Urology clinic Dr Abi Canchola: Urinary retension.  Patients phone was not available and voice mail was full

## 2020-06-02 NOTE — TELEPHONE ENCOUNTER
Name: Flex Reaves  : 1955  MRN: [id number]    Oncology Navigation Follow-Up Note    Contact Type:  Telephone  Notes: Spoke with Viktor Noel CNP, to inquire on pt. Ari Ashton stated has not seen pt today, reviewed inpatient notes today, & noted pt with increased confusion. Ari Ashton stated will speak with Dr. Elvira Burgos, [address] [facility], to f/u if reviewed tx records. Ari Ashton left VM stating spoke with Dr. Elvira Burgos. Dr. Elvira Burgos voiced concerns r/t treating pt in different system r/t previous radiation & dose constraints. Ari Ashton stated will recommend pt f/u with [facility] RO upon d/c or if needed transfer pt to [facility] facility. Dr. Ramiro Mcneil updated on VM. Will continue to follow.     Electronically signed by Rohit Friend RN on 2020 at 2:36 PM

## 2020-06-02 NOTE — TELEPHONE ENCOUNTER
Received a call from [address] @[facility], requesting RT records to be faxed and RT tx plan/images to be emailed to [email or web address]. Pt is currently admitted @[facility]. I told [address] that pts insurance is not accepted there [facility] medicare), and pt has stated she wants to come to [address] [place] [address] for further RT. [address] states Dr. Gilmar Reynolds still wants records as she was consulted for pt during her admission @[facility]. Records faxed. Request given to salomón for tx records to email.

## 2020-06-03 NOTE — TELEPHONE ENCOUNTER
Name: So Nick  : 1955  MRN: [id number]    Oncology Navigation Follow-Up Note    Contact Type:  Telephone  Notes: Spoke with [facility] nurse, notified writer navigating oncology care, spoke with Helder Erickson CNP, Viktor CHAPMAN, last 2 days & inquired on pt. Jada stated pt's mentation \"better today\" & pt with increased back pain. Jada stated MS long acting added & pt continues percocet prn pain. Jada stated pt recently returned to room from radiology. Jada stated pt completed CT abd/pelvis r/t infection. Jada stated pt remains full code & d/c planning for SNF. Kayla Finley will continue to follow closely & may contact [facility] RO prn @ [phone]. Will continue to follow.     Electronically signed by Amy Prasad RN on 6/3/2020 at 2:06 PM

## 2020-06-19 ENCOUNTER — TELEPHONE (OUTPATIENT)
Dept: PRIMARY CARE CLINIC | Age: 65
End: 2020-06-19

## 2023-05-16 NOTE — H&P
Called and spoke with pt's daughter regarding hyperkalemia on recent labs. Pt to hydrate and repeat lab today. She expressed understanding.    1 PUFF BY MOUTH AS DIRECTED TWO TIMES A DAY 11/1/19   Zhane Medhkour, DO   traZODone (DESYREL) 50 MG tablet TAKE ONE TABLET BY MOUTH DAILY AT BEDTIME 10/2/19   Zhane Medhkour, DO   sertraline (ZOLOFT) 100 MG tablet Take 1.5 tablets by mouth daily  Patient taking differently: Take 200 mg by mouth daily  9/6/19   Zhane Medhkour, DO   clopidogrel (PLAVIX) 75 MG tablet Take 75 mg by mouth daily    Historical Provider, MD   aspirin 81 MG tablet Take 81 mg by mouth daily    Historical Provider, MD   amLODIPine (NORVASC) 10 MG tablet TAKE ONE TABLET BY MOUTH EVERY DAY 1/4/19   Drake Allen MD   buPROPion (WELLBUTRIN SR) 150 MG extended release tablet Take 1 tablet by mouth daily 7/13/17   Drake Allen MD        Allergies:     Patient has no known allergies. Social History:     Tobacco:    reports that she quit smoking about 14 months ago. Her smoking use included cigarettes. She started smoking about 56 years ago. She has a 13.75 pack-year smoking history. She has never used smokeless tobacco.  Alcohol:      reports no history of alcohol use. Drug Use:  reports previous drug use. Drugs: Cocaine and Marijuana. Family History:     Family History   Problem Relation Age of Onset    Heart Disease Mother     High Blood Pressure Mother     Cancer Mother         cervix    Heart Disease Father     High Blood Pressure Father     Stroke Father     Cancer Sister         cervix    Stroke Sister     Cancer Paternal Grandfather         lung    Hypertension Brother     No Known Problems Sister        Review of Systems:     Positive and Negative as described in HPI. Review of Systems   Constitutional: Negative. HENT: Negative. Eyes: Negative. Respiratory: Positive for cough and shortness of breath. Gastrointestinal: Positive for nausea and vomiting. Negative for abdominal distention, abdominal pain and diarrhea. Genitourinary: Negative for dysuria, flank pain and frequency.         Urine 03/31/20  1:31 PM   Result Value Ref Range    Sodium 129 (L) 135 - 144 mmol/L       Imaging/Diagnostics:    Ct Abdomen Pelvis W Iv Contrast Additional Contrast? None    Result Date: 3/30/2020  No acute finding in the abdomen or pelvis. No evidence of metastatic disease in the abdomen or pelvis. Cholelithiasis with a 2.2 cm rim calcified gallstone. There are no findings to suggest acute cholecystitis. Clinical correlation and if indicated, follow-up nuclear medicine hepatobiliary imaging study may be helpful in this patient with right abdominal pain and nausea. Xr Chest Portable    Result Date: 3/30/2020  No acute disease. Ct Chest Pulmonary Embolism W Contrast    Result Date: 3/30/2020  No evidence of an acute pulmonary embolus. The mass in the lingula has decreased in size consistent with a response to therapy. Mediastinal lymph nodes are stable to slightly decreased in size. No new adenopathy. Mild bibasilar dependent opacity, likely atelectasis. Assessment :      Hospital Problems           Last Modified POA    * (Principal) MRSA bacteremia 3/31/2020 Yes    Essential hypertension 3/30/2020 Yes    Chronic systolic congestive heart failure ([address] 3/30/2020 Yes    Overview Signed 6/8/2018  8:18 PM by General Vicki MD     Lipomatous right atrium            Adenocarcinoma of left lung ([facility] [address] 3/30/2020 Yes    Hyponatremia 3/31/2020 Yes    Pancytopenia ([address] 3/31/2020 Yes          Plan:     Patient status inpatient in the Med/Surge    1. Consult infectious disease  2. Continue IV vancomycin-received a dose  3. Hold losartan for now 2/2 DANITA  4. Hold Zoloft for now 2/2 low-sodium  5. Hold IV hydration for now  6. Daily BMP, CBC  7. Monitor vital signs every 4 hours-ProAmatine as ordered  8. Supplemental O2 to keep SPO2 greater than 92%. 9. Symbicort as ordered  10. PT/OT eval and treat  11. Hold antiplatelets for now secondary to thrombocytopenia  12. EPC cuffs for DVT prophylaxis  13.  GI

## (undated) DEVICE — NEEDLE ASPIR 22GA L700MM US GUID TREAT DST END FOR

## (undated) DEVICE — NEEDLE ASPIR 22GA L40MM WRK L70CM SYR VLV ECHOGENIC DIMPLED